# Patient Record
Sex: FEMALE | Race: WHITE | Employment: FULL TIME | URBAN - METROPOLITAN AREA
[De-identification: names, ages, dates, MRNs, and addresses within clinical notes are randomized per-mention and may not be internally consistent; named-entity substitution may affect disease eponyms.]

---

## 2017-01-18 ENCOUNTER — HOSPITAL ENCOUNTER (EMERGENCY)
Age: 37
Discharge: HOME | End: 2017-01-18
Attending: EMERGENCY MEDICINE
Payer: COMMERCIAL

## 2017-01-18 VITALS
WEIGHT: 175 LBS | TEMPERATURE: 98.7 F | RESPIRATION RATE: 18 BRPM | BODY MASS INDEX: 29.16 KG/M2 | HEART RATE: 65 BPM | HEIGHT: 65 IN | DIASTOLIC BLOOD PRESSURE: 75 MMHG | SYSTOLIC BLOOD PRESSURE: 123 MMHG | OXYGEN SATURATION: 97 %

## 2017-01-18 DIAGNOSIS — R09.1 PLEURISY: Primary | ICD-10-CM

## 2017-01-18 LAB
ALBUMIN SERPL-MCNC: 4.6 G/DL (ref 3.9–4.9)
ALP BLD-CCNC: 83 U/L (ref 40–130)
ALT SERPL-CCNC: 31 U/L (ref 0–33)
ANION GAP SERPL CALCULATED.3IONS-SCNC: 12 MEQ/L (ref 7–13)
AST SERPL-CCNC: 29 U/L (ref 0–35)
BASOPHILS ABSOLUTE: 0 K/UL (ref 0–0.2)
BASOPHILS RELATIVE PERCENT: 0.3 %
BILIRUB SERPL-MCNC: 0.4 MG/DL (ref 0–1.2)
BUN BLDV-MCNC: 10 MG/DL (ref 6–20)
CALCIUM SERPL-MCNC: 10.4 MG/DL (ref 8.6–10.2)
CHLORIDE BLD-SCNC: 102 MEQ/L (ref 98–107)
CO2: 27 MEQ/L (ref 22–29)
CREAT SERPL-MCNC: 0.63 MG/DL (ref 0.5–0.9)
D DIMER: 0.42 MG/L FEU (ref 0–0.5)
EOSINOPHILS ABSOLUTE: 0.2 K/UL (ref 0–0.7)
EOSINOPHILS RELATIVE PERCENT: 2.3 %
GFR AFRICAN AMERICAN: >60
GFR NON-AFRICAN AMERICAN: >60
GLOBULIN: 4 G/DL (ref 2.3–3.5)
GLUCOSE BLD-MCNC: 100 MG/DL (ref 74–109)
HCT VFR BLD CALC: 44.5 % (ref 37–47)
HEMOGLOBIN: 15.2 G/DL (ref 12–16)
INR BLD: 1
LYMPHOCYTES ABSOLUTE: 1.5 K/UL (ref 1–4.8)
LYMPHOCYTES RELATIVE PERCENT: 20.2 %
MCH RBC QN AUTO: 29.5 PG (ref 27–31.3)
MCHC RBC AUTO-ENTMCNC: 34.2 % (ref 33–37)
MCV RBC AUTO: 86.3 FL (ref 82–100)
MONOCYTES ABSOLUTE: 0.4 K/UL (ref 0.2–0.8)
MONOCYTES RELATIVE PERCENT: 5 %
NEUTROPHILS ABSOLUTE: 5.2 K/UL (ref 1.4–6.5)
NEUTROPHILS RELATIVE PERCENT: 72.2 %
PDW BLD-RTO: 12.7 % (ref 11.5–14.5)
PLATELET # BLD: 199 K/UL (ref 130–400)
POTASSIUM SERPL-SCNC: 3.9 MEQ/L (ref 3.5–5.1)
PROTHROMBIN TIME: 10.2 SEC (ref 9.6–12.3)
RBC # BLD: 5.16 M/UL (ref 4.2–5.4)
SODIUM BLD-SCNC: 141 MEQ/L (ref 132–144)
TOTAL PROTEIN: 8.6 G/DL (ref 6.4–8.1)
WBC # BLD: 7.2 K/UL (ref 4.8–10.8)

## 2017-01-18 PROCEDURE — 85025 COMPLETE CBC W/AUTO DIFF WBC: CPT

## 2017-01-18 PROCEDURE — 99284 EMERGENCY DEPT VISIT MOD MDM: CPT

## 2017-01-18 PROCEDURE — 6360000002 HC RX W HCPCS: Performed by: EMERGENCY MEDICINE

## 2017-01-18 PROCEDURE — 36415 COLL VENOUS BLD VENIPUNCTURE: CPT

## 2017-01-18 PROCEDURE — 85610 PROTHROMBIN TIME: CPT

## 2017-01-18 PROCEDURE — 85379 FIBRIN DEGRADATION QUANT: CPT

## 2017-01-18 PROCEDURE — 96374 THER/PROPH/DIAG INJ IV PUSH: CPT

## 2017-01-18 PROCEDURE — 80053 COMPREHEN METABOLIC PANEL: CPT

## 2017-01-18 PROCEDURE — 2580000003 HC RX 258: Performed by: EMERGENCY MEDICINE

## 2017-01-18 RX ORDER — NAPROXEN 500 MG/1
500 TABLET ORAL 2 TIMES DAILY
Qty: 20 TABLET | Refills: 0 | Status: SHIPPED | OUTPATIENT
Start: 2017-01-18 | End: 2017-01-28

## 2017-01-18 RX ORDER — ESCITALOPRAM OXALATE 20 MG/1
20 TABLET ORAL DAILY
COMMUNITY

## 2017-01-18 RX ORDER — ALPRAZOLAM 0.5 MG/1
0.5 TABLET ORAL NIGHTLY PRN
COMMUNITY

## 2017-01-18 RX ORDER — MONTELUKAST SODIUM 10 MG/1
10 TABLET ORAL NIGHTLY
COMMUNITY

## 2017-01-18 RX ORDER — SODIUM CHLORIDE 0.9 % (FLUSH) 0.9 %
3 SYRINGE (ML) INJECTION EVERY 8 HOURS
Status: DISCONTINUED | OUTPATIENT
Start: 2017-01-18 | End: 2017-01-18 | Stop reason: HOSPADM

## 2017-01-18 RX ORDER — IBUPROFEN 800 MG/1
800 TABLET ORAL EVERY 6 HOURS PRN
COMMUNITY

## 2017-01-18 RX ORDER — GABAPENTIN 400 MG/1
400 CAPSULE ORAL 2 TIMES DAILY
COMMUNITY

## 2017-01-18 RX ORDER — KETOROLAC TROMETHAMINE 30 MG/ML
30 INJECTION, SOLUTION INTRAMUSCULAR; INTRAVENOUS ONCE
Status: COMPLETED | OUTPATIENT
Start: 2017-01-18 | End: 2017-01-18

## 2017-01-18 RX ADMIN — Medication 3 ML: at 15:19

## 2017-01-18 RX ADMIN — KETOROLAC TROMETHAMINE 30 MG: 30 INJECTION, SOLUTION INTRAMUSCULAR at 15:17

## 2017-01-18 ASSESSMENT — PAIN SCALES - GENERAL
PAINLEVEL_OUTOF10: 7
PAINLEVEL_OUTOF10: 5
PAINLEVEL_OUTOF10: 7
PAINLEVEL_OUTOF10: 2

## 2017-01-18 ASSESSMENT — PAIN DESCRIPTION - PAIN TYPE
TYPE: ACUTE PAIN

## 2017-01-18 ASSESSMENT — ENCOUNTER SYMPTOMS
EYE PAIN: 0
ABDOMINAL PAIN: 0
CONSTIPATION: 0
SORE THROAT: 0
FACIAL SWELLING: 0
TROUBLE SWALLOWING: 0
CHEST TIGHTNESS: 0
COUGH: 0
EYE DISCHARGE: 0
EYE REDNESS: 0
SHORTNESS OF BREATH: 0
WHEEZING: 0
DIARRHEA: 0
VOICE CHANGE: 0
BACK PAIN: 0
VOMITING: 0
SINUS PRESSURE: 0
CHOKING: 0
BLOOD IN STOOL: 0
STRIDOR: 0

## 2017-01-18 ASSESSMENT — PAIN DESCRIPTION - DESCRIPTORS
DESCRIPTORS: ACHING;SORE
DESCRIPTORS: ACHING

## 2017-01-18 ASSESSMENT — PAIN DESCRIPTION - LOCATION
LOCATION: RIB CAGE

## 2017-01-18 ASSESSMENT — PAIN DESCRIPTION - ORIENTATION
ORIENTATION: RIGHT;POSTERIOR;ANTERIOR
ORIENTATION: RIGHT

## 2017-01-18 ASSESSMENT — PAIN DESCRIPTION - ONSET: ONSET: GRADUAL

## 2017-01-18 ASSESSMENT — PAIN - FUNCTIONAL ASSESSMENT: PAIN_FUNCTIONAL_ASSESSMENT: FACES

## 2017-01-18 ASSESSMENT — PAIN DESCRIPTION - PROGRESSION: CLINICAL_PROGRESSION: GRADUALLY WORSENING

## 2017-01-18 ASSESSMENT — PAIN DESCRIPTION - FREQUENCY: FREQUENCY: CONTINUOUS

## 2023-02-21 LAB
ANION GAP IN SER/PLAS: 11 MMOL/L (ref 10–20)
CALCIDIOL (25 OH VITAMIN D3) (NG/ML) IN SER/PLAS: 31 NG/ML
CALCIUM (MG/DL) IN SER/PLAS: 10 MG/DL (ref 8.6–10.3)
CARBON DIOXIDE, TOTAL (MMOL/L) IN SER/PLAS: 29 MMOL/L (ref 21–32)
CHLORIDE (MMOL/L) IN SER/PLAS: 100 MMOL/L (ref 98–107)
COBALAMIN (VITAMIN B12) (PG/ML) IN SER/PLAS: 616 PG/ML (ref 211–911)
CREATININE (MG/DL) IN SER/PLAS: 0.82 MG/DL (ref 0.5–1.05)
ERYTHROCYTE DISTRIBUTION WIDTH (RATIO) BY AUTOMATED COUNT: 13.5 % (ref 11.5–14.5)
ERYTHROCYTE MEAN CORPUSCULAR HEMOGLOBIN CONCENTRATION (G/DL) BY AUTOMATED: 32.8 G/DL (ref 32–36)
ERYTHROCYTE MEAN CORPUSCULAR VOLUME (FL) BY AUTOMATED COUNT: 93 FL (ref 80–100)
ERYTHROCYTES (10*6/UL) IN BLOOD BY AUTOMATED COUNT: 4.97 X10E12/L (ref 4–5.2)
GFR FEMALE: >90 ML/MIN/1.73M2
GLUCOSE (MG/DL) IN SER/PLAS: 75 MG/DL (ref 74–99)
HEMATOCRIT (%) IN BLOOD BY AUTOMATED COUNT: 46 % (ref 36–46)
HEMOGLOBIN (G/DL) IN BLOOD: 15.1 G/DL (ref 12–16)
LEUKOCYTES (10*3/UL) IN BLOOD BY AUTOMATED COUNT: 9.7 X10E9/L (ref 4.4–11.3)
PLATELETS (10*3/UL) IN BLOOD AUTOMATED COUNT: 216 X10E9/L (ref 150–450)
POTASSIUM (MMOL/L) IN SER/PLAS: 4.4 MMOL/L (ref 3.5–5.3)
SODIUM (MMOL/L) IN SER/PLAS: 136 MMOL/L (ref 136–145)
THYROTROPIN (MIU/L) IN SER/PLAS BY DETECTION LIMIT <= 0.05 MIU/L: 2.24 MIU/L (ref 0.44–3.98)
UREA NITROGEN (MG/DL) IN SER/PLAS: 10 MG/DL (ref 6–23)

## 2023-03-28 ENCOUNTER — HOSPITAL ENCOUNTER (OUTPATIENT)
Dept: DATA CONVERSION | Facility: HOSPITAL | Age: 43
End: 2023-03-28
Attending: PAIN MEDICINE | Admitting: PAIN MEDICINE
Payer: COMMERCIAL

## 2023-03-28 DIAGNOSIS — G56.00 CARPAL TUNNEL SYNDROME, UNSPECIFIED UPPER LIMB: ICD-10-CM

## 2023-03-28 DIAGNOSIS — G56.01 CARPAL TUNNEL SYNDROME, RIGHT UPPER LIMB: ICD-10-CM

## 2023-03-28 DIAGNOSIS — Z98.51 TUBAL LIGATION STATUS: ICD-10-CM

## 2023-05-15 ASSESSMENT — PROMIS GLOBAL HEALTH SCALE
RATE_AVERAGE_FATIGUE: SEVERE
EMOTIONAL_PROBLEMS: OFTEN
RATE_MENTAL_HEALTH: POOR
RATE_QUALITY_OF_LIFE: FAIR
RATE_SOCIAL_SATISFACTION: POOR
CARRYOUT_SOCIAL_ACTIVITIES: FAIR
RATE_PHYSICAL_HEALTH: FAIR
RATE_AVERAGE_PAIN: 8
RATE_GENERAL_HEALTH: FAIR
CARRYOUT_PHYSICAL_ACTIVITIES: A LITTLE

## 2023-05-18 ENCOUNTER — TELEPHONE (OUTPATIENT)
Dept: PRIMARY CARE | Facility: CLINIC | Age: 43
End: 2023-05-18
Payer: COMMERCIAL

## 2023-05-18 NOTE — TELEPHONE ENCOUNTER
[id number] MRN  11:36 AM received call from doctor office # [phone].  Office called and is asking for hard copy of nerve induction study to be faxed to: [phone]. Going to [facility] appt. On Tuesday 05/23/23.      This is a Dr. Roman patient, and i did not see it in Epic... so I'm assuming it was faxed over old fashioned way?    Please assist. Thanks!

## 2023-05-19 ENCOUNTER — OFFICE VISIT (OUTPATIENT)
Dept: PRIMARY CARE | Facility: CLINIC | Age: 43
End: 2023-05-19
Payer: COMMERCIAL

## 2023-05-19 VITALS
HEART RATE: 84 BPM | HEIGHT: 65 IN | RESPIRATION RATE: 18 BRPM | DIASTOLIC BLOOD PRESSURE: 75 MMHG | WEIGHT: 211 LBS | BODY MASS INDEX: 35.16 KG/M2 | SYSTOLIC BLOOD PRESSURE: 110 MMHG | OXYGEN SATURATION: 96 %

## 2023-05-19 DIAGNOSIS — Z00.00 ROUTINE HEALTH MAINTENANCE: Primary | ICD-10-CM

## 2023-05-19 DIAGNOSIS — J30.9 ALLERGIC RHINITIS, UNSPECIFIED SEASONALITY, UNSPECIFIED TRIGGER: ICD-10-CM

## 2023-05-19 DIAGNOSIS — Z12.31 BREAST CANCER SCREENING BY MAMMOGRAM: ICD-10-CM

## 2023-05-19 DIAGNOSIS — G62.9 NEUROPATHY: ICD-10-CM

## 2023-05-19 PROBLEM — F41.9 ANXIETY: Status: ACTIVE | Noted: 2023-05-19

## 2023-05-19 PROBLEM — F41.1 GENERALIZED ANXIETY DISORDER: Status: ACTIVE | Noted: 2023-05-19

## 2023-05-19 PROBLEM — G56.00 CARPAL TUNNEL SYNDROME: Status: ACTIVE | Noted: 2023-05-19

## 2023-05-19 PROBLEM — K21.9 GERD (GASTROESOPHAGEAL REFLUX DISEASE): Status: ACTIVE | Noted: 2023-05-19

## 2023-05-19 PROBLEM — R74.8 ELEVATED LIVER ENZYMES: Status: ACTIVE | Noted: 2023-05-19

## 2023-05-19 PROBLEM — K82.8 BILIARY DYSKINESIA: Status: ACTIVE | Noted: 2023-05-19

## 2023-05-19 PROBLEM — F43.10 PTSD (POST-TRAUMATIC STRESS DISORDER): Status: ACTIVE | Noted: 2023-05-19

## 2023-05-19 PROBLEM — R20.2 PARESTHESIA AND PAIN OF BOTH UPPER EXTREMITIES: Status: ACTIVE | Noted: 2023-05-19

## 2023-05-19 PROBLEM — K52.832 LYMPHOCYTIC COLITIS: Status: ACTIVE | Noted: 2023-05-19

## 2023-05-19 PROBLEM — K58.0 IRRITABLE BOWEL SYNDROME WITH DIARRHEA: Status: ACTIVE | Noted: 2023-05-19

## 2023-05-19 PROBLEM — R10.2 PELVIC PAIN IN FEMALE: Status: ACTIVE | Noted: 2023-05-19

## 2023-05-19 PROBLEM — R73.01 IMPAIRED FASTING GLUCOSE: Status: ACTIVE | Noted: 2023-05-19

## 2023-05-19 PROBLEM — F33.1 MODERATE EPISODE OF RECURRENT MAJOR DEPRESSIVE DISORDER (MULTI): Status: ACTIVE | Noted: 2023-05-19

## 2023-05-19 PROBLEM — I10 STAGE 1 HYPERTENSION: Status: ACTIVE | Noted: 2023-05-19

## 2023-05-19 PROBLEM — L20.9 ATOPIC DERMATITIS: Status: ACTIVE | Noted: 2023-05-19

## 2023-05-19 PROBLEM — S92.023A: Status: ACTIVE | Noted: 2023-05-19

## 2023-05-19 PROBLEM — M70.50 PES ANSERINE BURSITIS: Status: ACTIVE | Noted: 2023-05-19

## 2023-05-19 PROBLEM — F41.8 DEPRESSION WITH ANXIETY: Status: ACTIVE | Noted: 2023-05-19

## 2023-05-19 PROBLEM — M79.601 PARESTHESIA AND PAIN OF BOTH UPPER EXTREMITIES: Status: ACTIVE | Noted: 2023-05-19

## 2023-05-19 PROBLEM — G47.00 INSOMNIA: Status: ACTIVE | Noted: 2023-05-19

## 2023-05-19 PROBLEM — L70.9 ACNE: Status: ACTIVE | Noted: 2023-05-19

## 2023-05-19 PROBLEM — M41.9 SCOLIOSIS: Status: ACTIVE | Noted: 2023-05-19

## 2023-05-19 PROBLEM — E55.9 VITAMIN D DEFICIENCY: Status: ACTIVE | Noted: 2023-05-19

## 2023-05-19 PROBLEM — N32.9 BLADDER DISORDER: Status: ACTIVE | Noted: 2023-05-19

## 2023-05-19 PROBLEM — M79.602 PARESTHESIA AND PAIN OF BOTH UPPER EXTREMITIES: Status: ACTIVE | Noted: 2023-05-19

## 2023-05-19 PROBLEM — K76.0 FATTY LIVER: Status: ACTIVE | Noted: 2023-05-19

## 2023-05-19 PROBLEM — K52.9 CHRONIC DIARRHEA: Status: ACTIVE | Noted: 2023-05-19

## 2023-05-19 PROCEDURE — 99396 PREV VISIT EST AGE 40-64: CPT | Performed by: FAMILY MEDICINE

## 2023-05-19 RX ORDER — TRAZODONE HYDROCHLORIDE 50 MG/1
2246400 TABLET ORAL NIGHTLY
COMMUNITY
End: 2023-10-03 | Stop reason: ALTCHOICE

## 2023-05-19 RX ORDER — GABAPENTIN 400 MG/1
400 CAPSULE ORAL 3 TIMES DAILY
Qty: 90 CAPSULE | Refills: 5 | Status: SHIPPED | OUTPATIENT
Start: 2023-05-19 | End: 2023-05-24

## 2023-05-19 RX ORDER — LAMOTRIGINE 100 MG/1
1 TABLET ORAL
COMMUNITY
End: 2023-10-04 | Stop reason: SDUPTHER

## 2023-05-19 RX ORDER — HYDROXYZINE HYDROCHLORIDE 25 MG/1
25 TABLET, FILM COATED ORAL 3 TIMES DAILY PRN
COMMUNITY
End: 2023-10-04

## 2023-05-19 RX ORDER — DULOXETIN HYDROCHLORIDE 60 MG/1
2 CAPSULE, DELAYED RELEASE ORAL DAILY
COMMUNITY
End: 2023-10-04 | Stop reason: SDUPTHER

## 2023-05-19 RX ORDER — PANTOPRAZOLE SODIUM 40 MG/1
TABLET, DELAYED RELEASE ORAL
COMMUNITY
Start: 2022-09-15 | End: 2023-09-21

## 2023-05-19 RX ORDER — BECLOMETHASONE DIPROPIONATE 80 UG/1
2 AEROSOL, METERED NASAL DAILY
Qty: 8.7 G | Refills: 11 | Status: SHIPPED | OUTPATIENT
Start: 2023-05-19 | End: 2023-12-09 | Stop reason: WASHOUT

## 2023-05-19 RX ORDER — ERGOCALCIFEROL 1.25 MG/1
1 CAPSULE ORAL WEEKLY
COMMUNITY
Start: 2022-05-25 | End: 2024-05-08 | Stop reason: SDUPTHER

## 2023-05-19 RX ORDER — ONDANSETRON 8 MG/1
TABLET, ORALLY DISINTEGRATING ORAL
COMMUNITY
End: 2023-10-31

## 2023-05-19 RX ORDER — GABAPENTIN 400 MG/1
1 CAPSULE ORAL 3 TIMES DAILY
COMMUNITY
Start: 2017-04-18 | End: 2023-05-19 | Stop reason: SDUPTHER

## 2023-05-19 ASSESSMENT — ENCOUNTER SYMPTOMS
FEVER: 0
COUGH: 0
NUMBNESS: 1

## 2023-05-19 NOTE — PROGRESS NOTES
"Subjective   Patient ID: Martina Blackwood is a 42 y.o. female who presents for Annual Exam.    HPI     Here today for annual physical and 6-month follow-up  Last mammogram was 5/2022.  This is category 1  She is a current smoker.  Patches were not effective in the past  She is no longer taking hydrochlorothiazide.  Denies any significant leg edema or elevated BP readings out of the office  She has been following with pain management for upper extremity neuropathy and had an EMG which was consistent with carpal tunnel syndrome.  She is scheduled to see a hand surgeon next week.  She is still having pain and tingling in both hands  She has a history of chronic lower extremity neuropathy which has been present for several years.  She previously had a lower extremity EMG done in 2018 that showed mild bilateral L5 radiculopathy.  She has neuropathy that typically will extend up to the level of her lower thighs.  Describes as a pins and needle sensation.  This is up to a 7 out of 10 intensity without gabapentin, but improved to 3 out of 10 when she takes gabapentin.  She has a history of allergic rhinitis and postnasal drainage.  Qnasl worked well for her in the past and she would like to restart this.  She had difficulty tolerating some of the liquid based nasal sprays in the past due to nausea  She is following with psychiatry for MDD, ANTONY, and PTSD.  Follows with GI for fatty liver and elevated transaminases, and chronic diarrhea  She had a Tdap vaccine in 2017.  PPS V vaccine in 2014          Review of Systems   Constitutional:  Negative for fever.   Respiratory:  Negative for cough.    Cardiovascular:  Negative for chest pain and leg swelling.   Neurological:  Positive for numbness.   All other systems reviewed and are negative.      Objective   /75   Pulse 84   Resp 18   Ht 1.651 m (5' 5\")   Wt 95.7 kg (211 lb)   SpO2 96%   BMI 35.11 kg/m²     Physical Exam  Vitals reviewed.   Constitutional:       " General: She is not in acute distress.     Appearance: Normal appearance. She is well-developed.   HENT:      Head: Normocephalic.      Right Ear: Tympanic membrane, ear canal and external ear normal.      Left Ear: Tympanic membrane, ear canal and external ear normal.      Nose: Nose normal.      Mouth/Throat:      Mouth: Mucous membranes are moist.   Eyes:      Conjunctiva/sclera: Conjunctivae normal.   Neck:      Thyroid: No thyromegaly.      Vascular: No JVD.   Cardiovascular:      Rate and Rhythm: Normal rate and regular rhythm.      Heart sounds: Normal heart sounds.   Pulmonary:      Effort: Pulmonary effort is normal.      Breath sounds: Normal breath sounds.   Abdominal:      Palpations: Abdomen is soft.      Tenderness: There is no abdominal tenderness.   Musculoskeletal:         General: Normal range of motion.      Right lower leg: No edema.      Left lower leg: No edema.   Lymphadenopathy:      Cervical: No cervical adenopathy.   Skin:     Findings: No rash.   Neurological:      Mental Status: She is alert and oriented to person, place, and time.      Sensory: No sensory deficit (Sensation intact in lower legs).   Psychiatric:         Mood and Affect: Mood normal.         Behavior: Behavior normal.         Assessment/Plan   Problem List Items Addressed This Visit          Medium    Allergic rhinitis    Relevant Medications    beclomethasone (QNASL) 80 mcg/actuation HFA aerosol inhaler    Neuropathy - Primary    Relevant Medications    gabapentin (Neurontin) 400 mg capsule    Other Relevant Orders    Lipid Panel     Other Visit Diagnoses       Breast cancer screening by mammogram        Relevant Orders    BI mammo bilateral screening tomosynthesis        Tobacco cessation recommended  Ordered screening mammogram  Pap testing not needed due to hysterectomy  Up-to-date on tetanus and pneumococcal vaccines  Check screening lipid panel.  She had labs including BMP, TSH, vitamin B12, and vitamin D done in  February of this year which were in normal range.  She reports getting LFTs checked through gastroenterology  Chronic lower extremity neuropathy has been stable on gabapentin.  Continue current dose and follow-up in 6 months for recheck.  Signed CSA today.  An OARRS report was printed and I have personally reviewed the results.  The report will be scanned into the health record.  I have considered the risk of abuse, dependance, addiction, and diversion.  I believe it is clinically appropriate for this patient to continue taking this medication.  Continue to follow with psychiatry for MDD, ANTONY and PTSD.  Continue to follow with GI for fatty liver, elevated transaminases, and chronic diarrhea.  She is going to be seeing a hand surgeon next week for carpal tunnel syndrome  Follow-up in 6 months for recheck  We will also restart Qnasl for allergic rhinitis and postnasal drainage.  This medication was effective for her in the past

## 2023-05-24 ENCOUNTER — TELEPHONE (OUTPATIENT)
Dept: PRIMARY CARE | Facility: CLINIC | Age: 43
End: 2023-05-24
Payer: COMMERCIAL

## 2023-05-24 DIAGNOSIS — G62.9 NEUROPATHY: Primary | ICD-10-CM

## 2023-05-24 RX ORDER — GABAPENTIN 100 MG/1
200 CAPSULE ORAL 2 TIMES DAILY
Qty: 120 CAPSULE | Refills: 3 | Status: SHIPPED | OUTPATIENT
Start: 2023-05-24 | End: 2023-09-21 | Stop reason: SDUPTHER

## 2023-05-24 RX ORDER — GABAPENTIN 800 MG/1
800 TABLET ORAL NIGHTLY
Qty: 30 TABLET | Refills: 3 | Status: SHIPPED | OUTPATIENT
Start: 2023-05-24 | End: 2023-09-21 | Stop reason: SDUPTHER

## 2023-05-24 NOTE — PROGRESS NOTES
I spoke with her over the phone.  She has a history of chronic lower extremity neuropathy currently taking gabapentin 1200 mg nightly.  I recently saw her 5 days ago for a follow-up appointment.  This has been helping her pain, however she has been following with psychiatry, and they felt that some of her anxiety symptoms may be due to withdrawal from the gabapentin during the day.  They recommended that she space out her gabapentin through the day to help avoid any withdrawal symptoms.  She has tried 400 mg 3 times daily in the past, but found that the 400 mg dose made her feel tired.  We will stop her current dose of gabapentin, and adjust as follows:  She will take gabapentin 800 mg before bedtime  She will take 200 mg in the morning and 200 mg in the afternoon    This will total 1200 mg/day, which is the same daily dose that she was on previously.  I recommended that she schedule a follow-up with me within the next 2 to 3 months for a recheck on how she is doing    An OARRS report was printed and I have personally reviewed the results.  The report will be scanned into the health record.  I have considered the risk of abuse, dependance, addiction, and diversion.  I believe it is clinically appropriate for this patient to continue taking this medication.

## 2023-05-24 NOTE — TELEPHONE ENCOUNTER
Patient called stating she went to see Psychiatrist and they discuss pill form change. Ms Mack was taking 3 Gabapentin at 2 night.   Her new order from Psychiatrist was to take 1/2 tab in the morning, 1/2 tab midday, and 2 at night. In order for her to do the half tabs she needs a new order for tablets instead of capsules. Please advise.

## 2023-05-24 NOTE — TELEPHONE ENCOUNTER
Pt calling back again returning your, pt has her ring tone turned up now, can you please return her call again

## 2023-06-12 ENCOUNTER — HOSPITAL ENCOUNTER (OUTPATIENT)
Dept: DATA CONVERSION | Facility: HOSPITAL | Age: 43
End: 2023-06-12
Payer: COMMERCIAL

## 2023-06-12 DIAGNOSIS — G62.9 POLYNEUROPATHY, UNSPECIFIED: ICD-10-CM

## 2023-06-12 DIAGNOSIS — F17.200 NICOTINE DEPENDENCE, UNSPECIFIED, UNCOMPLICATED: ICD-10-CM

## 2023-06-12 DIAGNOSIS — G56.01 CARPAL TUNNEL SYNDROME, RIGHT UPPER LIMB: ICD-10-CM

## 2023-06-12 DIAGNOSIS — Z88.0 ALLERGY STATUS TO PENICILLIN: ICD-10-CM

## 2023-06-12 DIAGNOSIS — K21.9 GASTRO-ESOPHAGEAL REFLUX DISEASE WITHOUT ESOPHAGITIS: ICD-10-CM

## 2023-06-12 DIAGNOSIS — Z85.3 PERSONAL HISTORY OF MALIGNANT NEOPLASM OF BREAST: ICD-10-CM

## 2023-06-12 DIAGNOSIS — Z88.1 ALLERGY STATUS TO OTHER ANTIBIOTIC AGENTS: ICD-10-CM

## 2023-07-17 ENCOUNTER — TELEPHONE (OUTPATIENT)
Dept: PRIMARY CARE | Facility: CLINIC | Age: 43
End: 2023-07-17
Payer: COMMERCIAL

## 2023-07-17 DIAGNOSIS — K76.0 FATTY LIVER: Primary | ICD-10-CM

## 2023-07-17 DIAGNOSIS — K52.9 CHRONIC DIARRHEA: ICD-10-CM

## 2023-08-18 ENCOUNTER — OFFICE VISIT (OUTPATIENT)
Dept: PRIMARY CARE | Facility: CLINIC | Age: 43
End: 2023-08-18
Payer: COMMERCIAL

## 2023-08-18 VITALS
SYSTOLIC BLOOD PRESSURE: 116 MMHG | WEIGHT: 205 LBS | RESPIRATION RATE: 18 BRPM | TEMPERATURE: 98.5 F | BODY MASS INDEX: 34.11 KG/M2 | HEART RATE: 89 BPM | DIASTOLIC BLOOD PRESSURE: 78 MMHG | OXYGEN SATURATION: 98 %

## 2023-08-18 DIAGNOSIS — J02.9 SORE THROAT: Primary | ICD-10-CM

## 2023-08-18 DIAGNOSIS — J40 SINOBRONCHITIS: ICD-10-CM

## 2023-08-18 DIAGNOSIS — J32.9 SINOBRONCHITIS: ICD-10-CM

## 2023-08-18 DIAGNOSIS — B37.9 ANTIBIOTIC-INDUCED YEAST INFECTION: ICD-10-CM

## 2023-08-18 DIAGNOSIS — T36.95XA ANTIBIOTIC-INDUCED YEAST INFECTION: ICD-10-CM

## 2023-08-18 PROBLEM — J06.9 ACUTE URI: Status: ACTIVE | Noted: 2023-08-18

## 2023-08-18 LAB — POC RAPID STREP: NEGATIVE

## 2023-08-18 PROCEDURE — 3008F BODY MASS INDEX DOCD: CPT | Performed by: NURSE PRACTITIONER

## 2023-08-18 PROCEDURE — 87635 SARS-COV-2 COVID-19 AMP PRB: CPT

## 2023-08-18 PROCEDURE — 99213 OFFICE O/P EST LOW 20 MIN: CPT | Performed by: NURSE PRACTITIONER

## 2023-08-18 PROCEDURE — 87880 STREP A ASSAY W/OPTIC: CPT | Performed by: NURSE PRACTITIONER

## 2023-08-18 PROCEDURE — 87651 STREP A DNA AMP PROBE: CPT

## 2023-08-18 RX ORDER — LAMOTRIGINE 25 MG/1
1 TABLET ORAL DAILY
COMMUNITY
End: 2023-10-04 | Stop reason: SDUPTHER

## 2023-08-18 RX ORDER — ALBUTEROL SULFATE 90 UG/1
2 AEROSOL, METERED RESPIRATORY (INHALATION) EVERY 6 HOURS PRN
Qty: 18 G | Refills: 0 | Status: SHIPPED | OUTPATIENT
Start: 2023-08-18 | End: 2023-10-03 | Stop reason: ALTCHOICE

## 2023-08-18 RX ORDER — DOXYCYCLINE 100 MG/1
100 CAPSULE ORAL 2 TIMES DAILY
Qty: 14 CAPSULE | Refills: 0 | Status: SHIPPED | OUTPATIENT
Start: 2023-08-18 | End: 2023-08-25

## 2023-08-18 RX ORDER — FLUCONAZOLE 150 MG/1
150 TABLET ORAL ONCE
Qty: 1 TABLET | Refills: 0 | Status: SHIPPED | OUTPATIENT
Start: 2023-08-18 | End: 2023-08-18

## 2023-08-18 ASSESSMENT — ENCOUNTER SYMPTOMS
HEADACHES: 1
SORE THROAT: 1
SINUS PRESSURE: 0
ACTIVITY CHANGE: 0
FEVER: 0
VOMITING: 0
SHORTNESS OF BREATH: 1
CHILLS: 0
FATIGUE: 0
CONSTIPATION: 0
APPETITE CHANGE: 0
MYALGIAS: 1
NAUSEA: 0
SINUS PAIN: 0
DIARRHEA: 0
COUGH: 1
SLEEP DISTURBANCE: 0

## 2023-08-18 NOTE — PROGRESS NOTES
Subjective   Patient ID: Martina Blackwood is a 42 y.o. female who presents for Cough.    Cough x1 week  Dry   Sore throat; on fire  Congestion/ no drainage  Headache  Chills/ no fever  Achy  Ear pressure      Sister/ mother are sick    OTC- robutussin/ hard candies/ tyenol    Cough  This is a new problem. Episode onset: 1 week. The cough is Non-productive. Associated symptoms include ear pain, headaches, myalgias, nasal congestion, postnasal drip, a sore throat and shortness of breath. Pertinent negatives include no chills or fever.        Review of Systems   Constitutional:  Negative for activity change, appetite change, chills, fatigue and fever.   HENT:  Positive for congestion, ear pain, postnasal drip and sore throat. Negative for sinus pressure and sinus pain.    Respiratory:  Positive for cough and shortness of breath.    Gastrointestinal:  Negative for constipation, diarrhea, nausea and vomiting.   Musculoskeletal:  Positive for myalgias.   Neurological:  Positive for headaches.   Psychiatric/Behavioral:  Negative for sleep disturbance.        Objective   /78   Pulse 89   Temp 36.9 °C (98.5 °F)   Resp 18   Wt 93 kg (205 lb)   SpO2 98%   BMI 34.11 kg/m²     Physical Exam  Vitals reviewed.   Constitutional:       Appearance: Normal appearance.   HENT:      Head: Normocephalic.      Right Ear: Tympanic membrane, ear canal and external ear normal.      Left Ear: Tympanic membrane, ear canal and external ear normal.      Nose: Mucosal edema and congestion present.      Right Turbinates: Swollen.      Left Turbinates: Swollen.      Mouth/Throat:      Lips: Pink.      Mouth: Mucous membranes are moist.      Pharynx: Posterior oropharyngeal erythema present.   Eyes:      Extraocular Movements: Extraocular movements intact.      Pupils: Pupils are equal, round, and reactive to light.   Cardiovascular:      Rate and Rhythm: Normal rate and regular rhythm.      Pulses: Normal pulses.      Heart sounds:  Normal heart sounds.   Pulmonary:      Effort: Pulmonary effort is normal.      Breath sounds: Normal breath sounds.   Musculoskeletal:      Cervical back: Normal range of motion and neck supple.   Skin:     General: Skin is warm and dry.      Capillary Refill: Capillary refill takes less than 2 seconds.   Neurological:      General: No focal deficit present.      Mental Status: She is alert and oriented to person, place, and time.   Psychiatric:         Mood and Affect: Mood normal.         Behavior: Behavior normal.         Assessment/Plan   Problem List Items Addressed This Visit       Antibiotic-induced yeast infection     Rx given for a one time dose of diflucan; Use as directed  Follow up with PCP as needed           Relevant Medications    fluconazole (Diflucan) 150 mg tablet    Sore throat - Primary     IO Strep negative  Covid swab and Strep PCR to be sent; Will follow up on results as needed  Encouraged to warm, salt water gargle several times a day  Educated on quarantine protocol  Reviewed supportive care for cold/Covid symptoms  Can use Flonase and Zyrtec daily for symptom support  F/U with PCP if not improving over the next 3-5 days  ER for SOB, difficulty breathing, uncontrolled fever         Relevant Orders    POCT rapid strep A manually resulted (Completed)    Sars-CoV-2 PCR, Symptomatic    Sinobronchitis     Antibiotics given for acute sinobronchitis; Take full course until completed  Reviewed supportive care for cold/Covid symptoms  Can use Flonase and Zyrtec daily for symptom support  F/U with PCP if not improving over the next 3-5 days  ER for SOB, difficulty breathing, uncontrolled fever         Relevant Medications    doxycycline (Vibramycin) 100 mg capsule    albuterol 90 mcg/actuation inhaler

## 2023-08-18 NOTE — ASSESSMENT & PLAN NOTE
IO Strep negative  Covid swab and Strep PCR to be sent; Will follow up on results as needed  Encouraged to warm, salt water gargle several times a day  Educated on quarantine protocol  Reviewed supportive care for cold/Covid symptoms  Can use Flonase and Zyrtec daily for symptom support  F/U with PCP if not improving over the next 3-5 days  ER for SOB, difficulty breathing, uncontrolled fever

## 2023-08-18 NOTE — ASSESSMENT & PLAN NOTE
Antibiotics given for acute sinobronchitis; Take full course until completed  Reviewed supportive care for cold/Covid symptoms  Can use Flonase and Zyrtec daily for symptom support  F/U with PCP if not improving over the next 3-5 days  ER for SOB, difficulty breathing, uncontrolled fever

## 2023-08-19 LAB
GROUP A STREP, PCR: NOT DETECTED
SARS-COV-2 RESULT: NOT DETECTED

## 2023-09-07 VITALS — BODY MASS INDEX: 35.48 KG/M2 | WEIGHT: 212.96 LBS | HEIGHT: 65 IN

## 2023-09-21 ENCOUNTER — OFFICE VISIT (OUTPATIENT)
Dept: PRIMARY CARE | Facility: CLINIC | Age: 43
End: 2023-09-21
Payer: COMMERCIAL

## 2023-09-21 VITALS
HEIGHT: 65 IN | OXYGEN SATURATION: 97 % | HEART RATE: 90 BPM | BODY MASS INDEX: 33.82 KG/M2 | WEIGHT: 203 LBS | SYSTOLIC BLOOD PRESSURE: 118 MMHG | DIASTOLIC BLOOD PRESSURE: 80 MMHG

## 2023-09-21 DIAGNOSIS — G62.9 NEUROPATHY: ICD-10-CM

## 2023-09-21 DIAGNOSIS — G56.02 CARPAL TUNNEL SYNDROME OF LEFT WRIST: Primary | ICD-10-CM

## 2023-09-21 PROCEDURE — 99213 OFFICE O/P EST LOW 20 MIN: CPT | Performed by: FAMILY MEDICINE

## 2023-09-21 PROCEDURE — 3008F BODY MASS INDEX DOCD: CPT | Performed by: FAMILY MEDICINE

## 2023-09-21 RX ORDER — OMEPRAZOLE 40 MG/1
40 CAPSULE, DELAYED RELEASE ORAL
COMMUNITY
Start: 2023-08-10

## 2023-09-21 RX ORDER — GABAPENTIN 800 MG/1
800 TABLET ORAL NIGHTLY
Qty: 30 TABLET | Refills: 3 | Status: SHIPPED | OUTPATIENT
Start: 2023-09-21 | End: 2023-11-08

## 2023-09-21 RX ORDER — GABAPENTIN 100 MG/1
200 CAPSULE ORAL 2 TIMES DAILY
Qty: 120 CAPSULE | Refills: 3 | Status: SHIPPED | OUTPATIENT
Start: 2023-09-21 | End: 2023-11-08

## 2023-09-21 ASSESSMENT — ENCOUNTER SYMPTOMS
TINGLING: 1
NUMBNESS: 1

## 2023-09-21 NOTE — PROGRESS NOTES
"Subjective   Patient ID: Martina Blackwood is a 43 y.o. female who presents for Hand Pain.    Hand Pain   The incident occurred more than 1 week ago. There was no injury mechanism. The pain is present in the left hand. The quality of the pain is described as stabbing. The pain is at a severity of 8/10. The pain is moderate. The pain has been Constant since the incident. Associated symptoms include numbness and tingling. She has tried acetaminophen for the symptoms. The treatment provided no relief.        She has had a 6 to 12-month history of pain and paresthesias involving her left wrist and hand.  This is a tingling and burning pain which is up to an 8 out of 10 intensity pain.  She is having weakness and clumsiness involving the hand also.  She has a history of carpal tunnel repair on the right side in June of this year  She previously had an EMG done earlier this year which showed findings of moderate carpal tunnel syndrome on the right, and mild and early carpal tunnel syndrome on the left        Review of Systems   Neurological:  Positive for tingling and numbness.       Objective   /80   Pulse 90   Ht 1.651 m (5' 5\")   Wt 92.1 kg (203 lb)   SpO2 97%   BMI 33.78 kg/m²     Physical Exam  Constitutional:       General: She is not in acute distress.     Appearance: Normal appearance. She is well-developed.   Pulmonary:      Effort: Pulmonary effort is normal.   Musculoskeletal:      Comments: Tenderness along the anterior aspect of the wrist on the left side.  Positive Tinel's at wrist.  Finger strength is intact   Skin:     Findings: No rash.   Neurological:      Mental Status: She is alert.   Psychiatric:         Mood and Affect: Mood normal.         Behavior: Behavior normal.         Assessment/Plan   Problem List Items Addressed This Visit          Medium    Carpal tunnel syndrome - Primary    Relevant Orders    Referral to Orthopaedic Surgery    Neuropathy    Relevant Medications    gabapentin " (Neurontin) 800 mg tablet    gabapentin (Neurontin) 100 mg capsule   She has had worsening pain and paresthesias in her left hand and wrist due to carpal tunnel syndrome.  We discussed activity modification and avoiding repetitive and aggravating activities.  Discussed wrist splint.  We will refer her to orthopedics to discuss further  She is also requesting a refill on her gabapentin for chronic lower extremity neuropathy.  We have been following her regularly for this, and this was last discussed in detail at her most recent appointment in May.  She is scheduled to follow-up in November for a full follow-up on this.  Did not discuss in detail today, but I did give her her refill.  OARRS reviewed

## 2023-10-02 ENCOUNTER — APPOINTMENT (OUTPATIENT)
Dept: PRIMARY CARE | Facility: CLINIC | Age: 43
End: 2023-10-02
Payer: COMMERCIAL

## 2023-10-02 PROBLEM — E66.812 CLASS 2 OBESITY WITH BODY MASS INDEX (BMI) OF 35.0 TO 35.9 IN ADULT: Status: ACTIVE | Noted: 2023-10-02

## 2023-10-02 PROBLEM — S63.629A COMPLETE TEAR OF ULNAR COLLATERAL LIGAMENT OF INTERPHALANGEAL JOINT OF THUMB: Status: ACTIVE | Noted: 2023-10-02

## 2023-10-02 PROBLEM — E66.9 CLASS 2 OBESITY WITH BODY MASS INDEX (BMI) OF 35.0 TO 35.9 IN ADULT: Status: ACTIVE | Noted: 2023-10-02

## 2023-10-02 PROBLEM — R74.01 ELEVATED TRANSAMINASE LEVEL: Status: ACTIVE | Noted: 2023-10-02

## 2023-10-02 PROBLEM — L08.9 SUPERFICIAL SKIN INFECTION: Status: ACTIVE | Noted: 2023-10-02

## 2023-10-02 PROBLEM — C50.911 MALIGNANT NEOPLASM OF RIGHT FEMALE BREAST (MULTI): Status: ACTIVE | Noted: 2023-10-02

## 2023-10-02 PROBLEM — H60.90 OTITIS EXTERNA: Status: ACTIVE | Noted: 2023-10-02

## 2023-10-02 PROBLEM — E66.9 CLASS 2 OBESITY WITH BODY MASS INDEX (BMI) OF 37.0 TO 37.9 IN ADULT: Status: ACTIVE | Noted: 2023-10-02

## 2023-10-02 PROBLEM — S63.649A: Status: ACTIVE | Noted: 2023-10-02

## 2023-10-02 PROBLEM — H93.12 TINNITUS OF LEFT EAR: Status: ACTIVE | Noted: 2023-10-02

## 2023-10-02 PROBLEM — E66.812 CLASS 2 OBESITY WITH BODY MASS INDEX (BMI) OF 37.0 TO 37.9 IN ADULT: Status: ACTIVE | Noted: 2023-10-02

## 2023-10-02 RX ORDER — GABAPENTIN 100 MG/1
CAPSULE ORAL
COMMUNITY
End: 2023-11-08

## 2023-10-02 RX ORDER — CALCIPOTRIENE 0.005% / CLOBETASOL PROPIONATE 0.05% .005; .05 G/100G; G/100G
SOLUTION TOPICAL
COMMUNITY

## 2023-10-02 RX ORDER — TRAZODONE HYDROCHLORIDE 50 MG/1
1-2 TABLET ORAL NIGHTLY
COMMUNITY
End: 2023-10-04 | Stop reason: SDUPTHER

## 2023-10-02 RX ORDER — OXYBUTYNIN CHLORIDE 15 MG/1
TABLET, EXTENDED RELEASE ORAL
COMMUNITY

## 2023-10-02 NOTE — OP NOTE
PROCEDURE DETAILS      Postoperative Diagnosis:  carpal tunnel syndrome  Surgeon: Yanelis Francisco MD   Resident/Fellow/Other Assistant: None     Procedure:  Injection of steroid and local anesthetic at the right median nerve  Estimated Blood Loss: 0  Findings: None         Operative Report:   Patient taken to the procedure area the right wrist was semiflexed and supported prepped and draped sterilely in the normal fashion with the end of 25-gauge  needle 40 mg of Depo-Medrol and 2 cc of 0.25% bupivacaine were injected around the right median nerve with negative aspiration of heme patient tolerated the procedure well recovered for sufficient amount of time and then was discharged home in stable  condition patient was advised to follow-up with the pain clinic on as-needed basis  Thank you for allowing me to participate in the care of this patient    Please note this report has been  produced using speech recognition software and may contain errors related to that system including grammar , punctuation  and spelling as well as words and phrases that may be inappropriate. If there are questions or concerns, please feel free to contact me to clarify.                          Electronic Signatures:  Yanelis Francisco)  (Signed 28-Mar-2023 14:08)   Authored: Post-Operative Note, Chart Review, Note Completion      Last Updated: 28-Mar-2023 14:08 by Yanelis Francisco)

## 2023-10-03 ENCOUNTER — OFFICE VISIT (OUTPATIENT)
Dept: PRIMARY CARE | Facility: CLINIC | Age: 43
End: 2023-10-03
Payer: COMMERCIAL

## 2023-10-03 VITALS
DIASTOLIC BLOOD PRESSURE: 70 MMHG | HEIGHT: 65 IN | SYSTOLIC BLOOD PRESSURE: 118 MMHG | OXYGEN SATURATION: 95 % | HEART RATE: 77 BPM | BODY MASS INDEX: 33.99 KG/M2 | WEIGHT: 204 LBS

## 2023-10-03 DIAGNOSIS — R51.9 NEW ONSET HEADACHE: Primary | ICD-10-CM

## 2023-10-03 PROBLEM — J40 SINOBRONCHITIS: Status: RESOLVED | Noted: 2023-08-18 | Resolved: 2023-10-03

## 2023-10-03 PROBLEM — R74.8 ELEVATED LIVER ENZYMES: Status: RESOLVED | Noted: 2023-05-19 | Resolved: 2023-10-03

## 2023-10-03 PROBLEM — H60.90 OTITIS EXTERNA: Status: RESOLVED | Noted: 2023-10-02 | Resolved: 2023-10-03

## 2023-10-03 PROBLEM — L08.9 SUPERFICIAL SKIN INFECTION: Status: RESOLVED | Noted: 2023-10-02 | Resolved: 2023-10-03

## 2023-10-03 PROBLEM — J32.9 SINOBRONCHITIS: Status: RESOLVED | Noted: 2023-08-18 | Resolved: 2023-10-03

## 2023-10-03 PROBLEM — J02.9 SORE THROAT: Status: RESOLVED | Noted: 2023-08-18 | Resolved: 2023-10-03

## 2023-10-03 PROCEDURE — 3008F BODY MASS INDEX DOCD: CPT | Performed by: FAMILY MEDICINE

## 2023-10-03 PROCEDURE — 99214 OFFICE O/P EST MOD 30 MIN: CPT | Performed by: FAMILY MEDICINE

## 2023-10-03 RX ORDER — AMITRIPTYLINE HYDROCHLORIDE 10 MG/1
10 TABLET, FILM COATED ORAL NIGHTLY
Qty: 30 TABLET | Refills: 3 | Status: SHIPPED | OUTPATIENT
Start: 2023-10-03 | End: 2023-10-25 | Stop reason: SDUPTHER

## 2023-10-03 ASSESSMENT — ENCOUNTER SYMPTOMS
NECK PAIN: 1
SCALP TENDERNESS: 1
NUMBNESS: 1
HEADACHES: 1
DIZZINESS: 0
COUGH: 0
FEVER: 0

## 2023-10-03 NOTE — PROGRESS NOTES
"xSubjective   Patient ID: Martina Blackwood is a 43 y.o. female who presents for Migraine.    Migraine   This is a new problem. The problem has been unchanged. The pain is located in the Bilateral region. The pain radiates to the right neck and left neck. The quality of the pain is described as squeezing, sharp, stabbing, throbbing and band-like. The pain is at a severity of 7/10. The pain is moderate. Associated symptoms include neck pain, numbness (Chronic neuropathy in legs.  No new onset of neurologic symptoms) and scalp tenderness. Pertinent negatives include no coughing, dizziness or fever.   She is here today to discuss headaches  She has had headaches present for 1.5 to 2 months  Headaches are bilateral and involve her upper head and also occiput.  Headaches are constant.  She describes as a squeezing pain which is 7 out of 10 intensity.  The headaches have been getting slightly worse.  There are no aggravating factors.  She does have nausea but this is more chronic.  No photophobia or phonophobia.  No vision changes including double vision or vision loss.  No new onset of extremity numbness.  She has been taking Tylenol with partial improvement  She does have a history of migraines in the past, but these headaches feel different.  Migraines were previously only occur every now and then  There is no family history of any brain tumor or aneurysm  She has not had any recent brain imaging        Review of Systems   Constitutional:  Negative for fever.   Respiratory:  Negative for cough.    Cardiovascular:  Negative for chest pain.   Musculoskeletal:  Positive for neck pain.   Neurological:  Positive for numbness (Chronic neuropathy in legs.  No new onset of neurologic symptoms) and headaches. Negative for dizziness.       Objective   /70   Pulse 77   Ht 1.651 m (5' 5\")   Wt 92.5 kg (204 lb)   SpO2 95%   BMI 33.95 kg/m²     Physical Exam  Vitals reviewed.   Constitutional:       General: She is not in " acute distress.     Appearance: Normal appearance.   HENT:      Head: Normocephalic.      Right Ear: Tympanic membrane, ear canal and external ear normal.      Left Ear: Tympanic membrane, ear canal and external ear normal.   Eyes:      Extraocular Movements: Extraocular movements intact.      Conjunctiva/sclera: Conjunctivae normal.      Pupils: Pupils are equal, round, and reactive to light.   Cardiovascular:      Rate and Rhythm: Normal rate and regular rhythm.      Heart sounds: Normal heart sounds.   Pulmonary:      Effort: Pulmonary effort is normal.      Breath sounds: Normal breath sounds.   Musculoskeletal:         General: Tenderness present.      Comments: Tenderness bilateral upper cervical paraspinals   Lymphadenopathy:      Cervical: No cervical adenopathy.   Skin:     Findings: No rash.   Neurological:      General: No focal deficit present.      Mental Status: She is alert.      Cranial Nerves: No cranial nerve deficit.      Motor: No weakness.      Deep Tendon Reflexes: Reflexes normal.   Psychiatric:         Mood and Affect: Mood normal.         Behavior: Behavior normal.         Assessment/Plan   Problem List Items Addressed This Visit    None  Visit Diagnoses       New onset headache    -  Primary    Relevant Medications    amitriptyline (Elavil) 10 mg tablet    Other Relevant Orders    MR brain w and wo IV contrast    XR cervical spine complete 4-5 views    Referral to Neurology        She presents today with a 1.5 to 2-month history of new onset headache.  She has a normal neurologic exam today.  She has a history of migraine headaches in the past, but her current headache does not fit criteria for migraine.  Since the headache has been getting worse, we will obtain neuroimaging with a brain MRI.  If MRI is unremarkable, then I suspect that her headaches are due to either tension headaches, or possibly cervicogenic related to her neck pain.  We will start amitriptyline 10 mg nightly, and also  obtain x-ray of the cervical spine.  Adverse effects discussed.  We will also refer her to establish with a neurologist to discuss further.  Advised to call if any new or worsening symptoms.  Follow-up in 1 month for recheck

## 2023-10-04 ENCOUNTER — CLINICAL SUPPORT (OUTPATIENT)
Dept: BEHAVIORAL HEALTH | Facility: CLINIC | Age: 43
End: 2023-10-04
Payer: COMMERCIAL

## 2023-10-04 DIAGNOSIS — Z79.899 MEDICATION MANAGEMENT: ICD-10-CM

## 2023-10-04 DIAGNOSIS — Z00.00 HEALTH CARE MAINTENANCE: ICD-10-CM

## 2023-10-04 DIAGNOSIS — F43.10 PTSD (POST-TRAUMATIC STRESS DISORDER): ICD-10-CM

## 2023-10-04 DIAGNOSIS — F33.1 MODERATE EPISODE OF RECURRENT MAJOR DEPRESSIVE DISORDER (MULTI): ICD-10-CM

## 2023-10-04 DIAGNOSIS — F41.1 GENERALIZED ANXIETY DISORDER: ICD-10-CM

## 2023-10-04 PROCEDURE — 99215 OFFICE O/P EST HI 40 MIN: CPT | Performed by: CLINICAL NURSE SPECIALIST

## 2023-10-04 PROCEDURE — 99417 PROLNG OP E/M EACH 15 MIN: CPT | Performed by: CLINICAL NURSE SPECIALIST

## 2023-10-04 PROCEDURE — 99215 OFFICE O/P EST HI 40 MIN: CPT | Mod: 95 | Performed by: CLINICAL NURSE SPECIALIST

## 2023-10-04 RX ORDER — LAMOTRIGINE 100 MG/1
100 TABLET ORAL DAILY
Qty: 30 TABLET | Refills: 1 | Status: SHIPPED | OUTPATIENT
Start: 2023-10-04 | End: 2023-11-30 | Stop reason: SDUPTHER

## 2023-10-04 RX ORDER — DULOXETIN HYDROCHLORIDE 60 MG/1
60 CAPSULE, DELAYED RELEASE ORAL 2 TIMES DAILY
Qty: 60 CAPSULE | Refills: 1 | Status: SHIPPED | OUTPATIENT
Start: 2023-10-04 | End: 2023-11-08 | Stop reason: SDUPTHER

## 2023-10-04 RX ORDER — LAMOTRIGINE 25 MG/1
25 TABLET ORAL DAILY
Qty: 30 TABLET | Refills: 1 | Status: SHIPPED | OUTPATIENT
Start: 2023-10-04 | End: 2023-11-30 | Stop reason: SDUPTHER

## 2023-10-04 RX ORDER — ALPRAZOLAM 0.25 MG/1
0.25 TABLET ORAL DAILY PRN
Qty: 10 TABLET | Refills: 1 | Status: SHIPPED | OUTPATIENT
Start: 2023-10-04 | End: 2023-11-30 | Stop reason: DRUGHIGH

## 2023-10-04 RX ORDER — TRAZODONE HYDROCHLORIDE 50 MG/1
50-100 TABLET ORAL NIGHTLY
Qty: 60 TABLET | Refills: 1 | Status: SHIPPED | OUTPATIENT
Start: 2023-10-04 | End: 2023-11-30 | Stop reason: SDUPTHER

## 2023-10-04 NOTE — PROGRESS NOTES
"Outpatient Psychiatry      Subjective   Martina Blackwood, is a 43 y.o. female,  seen as a new patient to me today for MDD, ANTONY, PTSD.       Assessment/Plan   Diagnoses:   Encounter Diagnoses   Name Primary?    Moderate episode of recurrent major depressive disorder (CMS/HCC)     Generalized anxiety disorder     PTSD (post-traumatic stress disorder)        Assessment:    Limited benefit from hydroxyzine PRN for anxiety. Has also failed trial of gabapentin for anxiety, though it is beneficial for neuropathy, which she continues to take it for.     Discussed obtaining Genesight testing given that she has failed multiple medication trials and benefit from current regimen is somewhat limited. Will defer changes to routine medications until those results are available.     In the meantime, will discontinue hydroxyzine and start low-dose alprazolam PRN for severe anxiety/panic- limited supply, intended to be short-term.     Reviewed [place]ARRS, no discrepancies or concerns. Discussed risk of motor/cognitive impairment, sedation, dependence, tolerance, abuse, withdrawal sequelae, accidental overdose, life-threatening respiratory depression (bhavesh. in combination with alcohol and/or opioids), excess sedation in combination with other sedating medicines.      Otherwise continue current regimen as prescribed.     Treatment Plan/Recommendations:   Discontinue hydroxyzine  Start alprazolam 0.25 mg PO daily PRN for severe anxiety  Continue lamotrigine 125 mg PO daily  Continue duloxetine 60 mg PO BID  UDS ordered for controlled rx monitoring  Will order Genesight testing today  Continue individual therapy    FOLLOW UP: 4-6 weeks.       Reason for Visit:  Med management.     HPI:  Previously a patient of NASREEN Mercedes, seen as a new patient to me today. I have reviewed the medical record, including the most recent visit on 8/23/2023.     Since her last appointment,   Things have been \"super stressful.\"  ~2 weeks ago for several days " "couldn't get in contact with mom- had to call police for a well-check.   Found her at home, semi-responsive, had not been taking care of herself.   Has happened before and they've discussed placement in senior living, but she refuses.     Always has some level of anxiety, \"it never really fully goes away.\"   Does come on stronger and lead to a panic attack at times.   Driving in the direction of a doctor's office where she had a bad experience with surgery in the past is a trigger.   Sometimes untriggered or unexpected.   Feels like she's having a heart attack- chest pain, tightness, shallow breathing.   GI sx- \"almost like immediate diarrhea,\" nausea, sometimes vomiting  Sweaty, itchy, tingly palms.  The most severe part of the panic attack can last from 1- 15 minutes, after that still feels very tense for sometimes hours.   Baseline anxiety has been, \"edward stable,\" but panic has been more frequent- maybe 2-3 times/week.   When symptoms are under good control panic attacks happen maybe once/month.   Hydroxyzine has not been beneficial PRN.  Tried adjusting gabapentin dose to include some daytime dosing for anxiety, but has not been very helpful either- seems that lower dose at night is less effective for neuropathy, which then disrupts her sleep.     Sleep has been \"real funky\" for the past 6-7 months  Some nights very broken, wakes in the middle of the night and can't get back to sleep for hours.  Not feeling rested when she wakes.    Has been getting headaches for the past 2 months.   Saw doctor yesterday to try to figure out what they could be- has to have MRI, and may need to be referred to neurologist.   PCP prescribed amitriptyline 10 mg PO at bedtime.       Previous psychiatric medication trials include:  Alprazolam  Lorazepam  Venlafaxine- extreme nausea, \"just didn't feel right in my own body.\"  Citalopram- \" \"  Bupropion  Sertraline  Buspirone  Quetiapine  Olanzapine   Zolpidem     Most antidepressants " "caused her to feel groggy, mentally cloudy.     No hx of Genesight Testing.     Past Psychiatric History  Inpatient Hx: None  Outpatient Hx:  Previously a patient of Miguel, CNP, and more recently NASREEN Mercedes  Hx of Suicidal Ideation: Denies  Hx of Suicide Attempts: Denies  Hx of Violence/Aggression Towards others (including threats): Denies  Access to Fire Arms and/or Weapons: Denies  Current outpatient: Individual therapist is Vicenta Joe at [facility].     Substance Use History:  Nicotine: current smoker; quit for 4 years (~4 years ago, used patch for a short time), \"and then life hit me in the face, and I started back up.\" Currently smokes 1 ppd. Would like to quit again, has patches at home but hasn't felt ready to attempt yet.    Alcohol: Denies.   Illicits: denied. Tried cannabis as a teen, but didn't like how it made her feel.   Caffeine: caffeinated soft drinks 2-3 mini-cans of Pepsi /day    Legal history related to drug/alcohol use:  N/A.     Treatment history: N/A.        Pertinent medical diagnoses:  New-onset (~2 months) headaches- \"possibly cluster or tension headaches.\"     Current Medications:  Duloxetine- has been taking for ~2-3 years; unsure of benefit; has to take in split dose (60 mg PO BID). Once took as a single dose, \"felt wired and exhausted at the same time.\"   Trazodone- has been taking for ~1 year; has taken multiple times in the past.   Lamotrigine- \"I really don't feel like it's helping.\" Last increased \"a couple months ago,\" and not seeing any improvement in mood sx.   Gabapentin- helpful for neuropathy (which was original reason for rx) but not for sleep or anxiety.     Medical History:  Past Medical History:   Diagnosis Date    Adjustment disorder 2000    Allergic 1995    Anxiety 1997    Chronic pain disorder 1/11/11    Depression 1997    Eczema 1991    GERD (gastroesophageal reflux disease) 1997    Headache     Liver disease 2022    Other abnormal auditory perceptions, " unspecified ear 01/19/2018    Abnormal auditory perception    Other bursitis of knee, unspecified knee     Pes anserine bursitis    Panic attack     Panic disorder 1997    PTSD (post-traumatic stress disorder) 1/11/11    Sleep difficulties     Urinary tract infection 1997    Varicella 1986       Surgical History:  Past Surgical History:   Procedure Laterality Date    CHOLECYSTECTOMY  2022    ENDOMETRIAL ABLATION  2009    HYSTERECTOMY  1/11/11    OTHER SURGICAL HISTORY  12/22/2022    Cystoscopy    OTHER SURGICAL HISTORY  12/22/2022    Vaginal lesion ablation    OTHER SURGICAL HISTORY  09/15/2022    Colonoscopy    OTHER SURGICAL HISTORY  09/15/2022    Esophagogastroduodenoscopy    OTHER SURGICAL HISTORY  02/16/2022    Cholecystectomy laparoscopic    OTHER SURGICAL HISTORY  06/14/2019    Tubal ligation bilateral    OTHER SURGICAL HISTORY  06/14/2019    Hysterectomy    TUBAL LIGATION      WISDOM TOOTH EXTRACTION  2000       Family History:  Family History   Problem Relation Name Age of Onset    Alcohol abuse Mother Pretty     Arthritis Mother Pretty     Asthma Mother Pretty     COPD Mother Pretty     Depression Mother Pretty     Hypertension Mother Pretty     Mental illness Mother Pretty     Miscarriages / Stillbirths Mother Pretty     Anxiety disorder Mother Pretty     Alcohol abuse Father Chidi     COPD Father Chidi     Mental illness Father Chidi     Hearing loss Father Chidi     Celiac disease Father Chidi     Alcohol abuse Brother Vick     Depression Brother Vick     Diabetes Brother Vick     Drug abuse Brother Vick     Mental illness Brother Vick     Bipolar disorder Brother Vick     Other (cannabis use disorder) Brother Vick     Anxiety disorder Brother Vick     Paranoid behavior Brother Vick     Schizophrenia Brother Vick     Self-Injury Brother Vick     Suicide Attempts Brother Vick     Depression Daughter Kinadee     Genetic Testing Daughter Kinadee     Mental illness Daughter Kinadee     Depression Son Noel  "    Mental illness Son Noel     Depression Son Efren     Breast cancer Maternal Grandmother Chantale Perez     Breast cancer Other      Lung cancer Other         Record Review: extensive     Medical Review Of Systems:  Pertinent items are noted in HPI.    Psychiatric Review Of Systems:  As noted in HPI.        Objective   Mental Status Exam  General: well groomed, appropriate eye contact  Psychomotor: no psychomotor agitation or retardation, no tremor or other abnormal movements  Speech: Clear, Non-pressured, and spontaneous  Mood: \"My anxiety has been pretty high.\"  Affect:constricted and anxious  Thought Process: linear, goal directed  Perception: Perception: no perceptual abnormalities noted  Thought Content:  No suicidal ideation, no violent thinking, no delusional content.   Cognition: cognitively intact to conversational testing with respect to attention, orientation, fund of knowledge, recent and remote memory, and language  Insight: intact  Judgement: intact     Risk Assessment  Imminent Risk of Suicide or Serious Self-Injury: Low Risk -- Risk factors include: Age, Depression, History of trauma or abuse , Medical illness comorbidity , Psychosocial stressors including mom's declining health , and Severe anxiety Protective factors include:Denies current suicidal ideation, Denies history of suicide attempts , Future-oriented talk , Willingness to seek help and support , Gender, Receiving and engaged in care for mental, physical, and substance use disorders , History of adhering to treatment recommendations and/or prescribed medication regimen , Support through ongoing medical and mental healthcare relationships , Interpersonal relationships and supports, e.g., family, friends, peers, community , and Restricted access to firearms or other lethal means of suicide   Imminent Risk of Violence or Homicide:  No significant risk factors identified on screening    Vitals:  There were no vitals filed for this " visit.        Allyn Acosta, APRN-CNP, APRN-CNS

## 2023-10-06 ENCOUNTER — LAB (OUTPATIENT)
Dept: LAB | Facility: LAB | Age: 43
End: 2023-10-06
Payer: COMMERCIAL

## 2023-10-06 ENCOUNTER — ANCILLARY PROCEDURE (OUTPATIENT)
Dept: RADIOLOGY | Facility: CLINIC | Age: 43
End: 2023-10-06
Payer: COMMERCIAL

## 2023-10-06 DIAGNOSIS — Z79.899 MEDICATION MANAGEMENT: ICD-10-CM

## 2023-10-06 DIAGNOSIS — G62.9 NEUROPATHY: ICD-10-CM

## 2023-10-06 DIAGNOSIS — R51.9 NEW ONSET HEADACHE: ICD-10-CM

## 2023-10-06 DIAGNOSIS — Z00.00 HEALTH CARE MAINTENANCE: ICD-10-CM

## 2023-10-06 LAB
AMPHETAMINES UR QL SCN: NORMAL
BARBITURATES UR QL SCN: NORMAL
BENZODIAZ UR QL SCN: NORMAL
BZE UR QL SCN: NORMAL
CANNABINOIDS UR QL SCN: NORMAL
CHOLEST SERPL-MCNC: 199 MG/DL (ref 0–199)
CHOLESTEROL/HDL RATIO: 4.2
FENTANYL+NORFENTANYL UR QL SCN: NORMAL
HDLC SERPL-MCNC: 47.9 MG/DL
LDLC SERPL CALC-MCNC: 134 MG/DL (ref 140–190)
NON HDL CHOLESTEROL: 151 MG/DL (ref 0–149)
OPIATES UR QL SCN: NORMAL
OXYCODONE+OXYMORPHONE UR QL SCN: NORMAL
PCP UR QL SCN: NORMAL
TRIGL SERPL-MCNC: 88 MG/DL (ref 0–149)
VLDL: 18 MG/DL (ref 0–40)

## 2023-10-06 PROCEDURE — 36415 COLL VENOUS BLD VENIPUNCTURE: CPT

## 2023-10-06 PROCEDURE — 72050 X-RAY EXAM NECK SPINE 4/5VWS: CPT | Performed by: RADIOLOGY

## 2023-10-06 PROCEDURE — 80307 DRUG TEST PRSMV CHEM ANLYZR: CPT

## 2023-10-06 PROCEDURE — 72050 X-RAY EXAM NECK SPINE 4/5VWS: CPT

## 2023-10-06 PROCEDURE — 80061 LIPID PANEL: CPT

## 2023-10-18 ENCOUNTER — OFFICE VISIT (OUTPATIENT)
Dept: ORTHOPEDIC SURGERY | Facility: CLINIC | Age: 43
End: 2023-10-18
Payer: COMMERCIAL

## 2023-10-18 DIAGNOSIS — G56.02 CARPAL TUNNEL SYNDROME OF LEFT WRIST: ICD-10-CM

## 2023-10-18 PROCEDURE — 3008F BODY MASS INDEX DOCD: CPT | Performed by: ORTHOPAEDIC SURGERY

## 2023-10-18 PROCEDURE — 99204 OFFICE O/P NEW MOD 45 MIN: CPT | Performed by: ORTHOPAEDIC SURGERY

## 2023-10-18 PROCEDURE — 99214 OFFICE O/P EST MOD 30 MIN: CPT | Performed by: ORTHOPAEDIC SURGERY

## 2023-10-18 NOTE — LETTER
October 31, 2023     Jean Roman DO  [address]  [place]  [facility] [address]    Patient: Martina Blackwood   YOB: 1980   Date of Visit: 10/18/2023       Dear Dr. Jean Roman DO:    Thank you for referring Martina Blackwood to me for evaluation. Below are my notes for this consultation.  If you have questions, please do not hesitate to call me. I look forward to following your patient along with you.       Sincerely,     Torin Cole MD      CC: No Recipients  ______________________________________________________________________________________    CHIEF COMPLAINT         Left hand and wrist pain    ASSESSMENT + PLAN    Left carpal tunnel syndrome with positive EMG and early APB change    The nature of carpal tunnel syndrome was reviewed, along with the slowly progressive natural history.  The options for management were reviewed, including night splinting, cortisone injection, or surgical carpal tunnel release.  The major benefits and risks of surgery were specifically reviewed, as was the postoperative rehabilitation course.    The patient does want to go ahead with surgery and will contact the office to schedule an exact surgical date.  The procedure will be done under sedation and local, at the location of her convenience.        HISTORY OF PRESENT ILLNESS       Patient is a 43 y.o. female on disability, who presents today at suggestion of Dr. Roman, for evaluation of about 8 months of left hand and wrist pain from carpal tunnel syndrome.  There was no particular trauma around time of onset.  This initially would wake her from sleep with a positive shake sign.  She has been using a brace but it is no longer helping.  She had carpal tunnel surgery by Dr. Kim in [place] on the right for similar symptoms.  She reports that tingling from that side is much better, though not yet gone.  She has had an EMG, summarized below.  She wants to proceed with carpal tunnel surgery on the left.    She is  not diabetic or hypothyroid.  She does not smoke.  She does have depression and PTSD.  She is followed by Pain Management.  She takes gabapentin 1200.      REVIEW OF SYSTEMS       A 30-item multi-system Review Of Systems was obtained on today's intake form.  This was reviewed with the patient and is correct.  The pertinent positives and negatives are listed above.  The form has been scanned separately into the medical record.      PHYSICAL EXAM    Constitutional:    Appears stated age. Well-developed and well-nourished obese female in no acute distress.  Psychiatric:         Pleasant normal mood and affect. Behavior is appropriate for the situation.   Head:                   Normocephalic and atraumatic.  Eyes:                    Pupils are equal and round.  Cardiovascular:  2+ radial and ulnar pulses. Fingers well-perfused.  Respiratory:        Effort normal. No respiratory distress. Speaking in complete sentences.  Neurologic:       Alert and oriented to person, place, and time.  Skin:                Skin is intact, warm and dry.  Hematologic / Lymphatic:    No lymphedema or lymphangitis.    Extremities / Musculoskeletal:                      Skin of the left hand and wrist is intact with no erythema, ecchymosis, or diffuse swelling.  Normal skin drag and coloration.  Full composite finger flexion extension with full intrinsic plus minus posture.  Symmetric wrist and forearm motion.  5/5 APB and hand intrinsics with no wasting.  Cervical range of motion is good and does not reproduce chief complaint.  Negative Lita.  Negative Spurling.  Positive Phalen and Durkan at the wrist but negative Tinel at wrist and elbow.  Negative elbow flexion test.  Sensation intact to light touch in all distributions.  Capillary refill less than 2 seconds.      IMAGING / LABS / EMGs           EMG from February 21 is positive for carpal tunnel syndrome with early APB motor change bilaterally.  This was prior to her carpal tunnel  release on the right by Dr. Kim.      Past Medical History:   Diagnosis Date   • Adjustment disorder    • Allergic    • Anxiety    • Chronic pain disorder 11   • Depression    • Eczema    • GERD (gastroesophageal reflux disease)    • Headache    • Liver disease    • Other abnormal auditory perceptions, unspecified ear 2018    Abnormal auditory perception   • Other bursitis of knee, unspecified knee     Pes anserine bursitis   • Panic attack    • Panic disorder    • PTSD (post-traumatic stress disorder) 11   • Sleep difficulties    • Urinary tract infection    • Varicella        Medication Documentation Review Audit       Reviewed by Torin Cole MD (Physician) on 10/31/23 at 0718      Medication Order Taking? Sig Documenting Provider Last Dose Status   ALPRAZolam (Xanax) 0.25 mg tablet [id number]  Take 1 tablet (0.25 mg) by mouth once daily as needed for anxiety for up to 20 days. PABLO Joseph-CNP, APRN-CNS   10/24/23 2359   Discontinued 10/25/23 1645   amitriptyline (Elavil) 10 mg tablet [id number]  Take 2 tablets (20 mg) by mouth once daily at bedtime. Jean Roman, DO  Active   beclomethasone (QNASL) 80 mcg/actuation HFA aerosol inhaler [id number] No Administer 2 sprays into each nostril once daily. Jean Roman DO Taking Active   clobetasoL-calcipotriene 0.05-0.005 % solution [id number] No APPLY AND GENTLY MASSAGE INTO AFFECTED AREA(S) ONCE DAILY Historical Provider, MD Taking Active   diazePAM (Valium) 2 mg tablet [id number]  Take 1 tablet (2 mg) by mouth 1 time for 1 dose. Take 30 to 60 minutes prior to MRI Jean Roman DO   10/23/23 2359   dicyclomine (Bentyl) 20 mg tablet [id number]  Take 1 tablet (20 mg) by mouth 2 times a day for 5 days. Chip Eubanks, DO  Active   DULoxetine (Cymbalta) 60 mg DR capsule [id number]  Take 1 capsule (60 mg) by mouth 2 times a day. Allyn Acosta, PABLO-CNP, APRN-CNS  Active   ergocalciferol (Vitamin  D-2) 1.25 MG (63127 UT) capsule [id number] No Take 1 capsule (1,250 mcg) by mouth once a week. Historical Provider, MD Taking Active   gabapentin (Neurontin) 100 mg capsule [id number] No Take 2 capsules (200 mg) by mouth 2 times a day. Take 200 mg in the morning and 200 mg in the afternoon Jean Roman DO Taking  10/21/23 235   gabapentin (Neurontin) 100 mg capsule [id number] No 1 CAP QAM AND 3 CAPS at bedtime WITH 800 MG CAP Historical Provider, MD Not Taking Active   gabapentin (Neurontin) 800 mg tablet [id number] No Take 1 tablet (800 mg) by mouth once daily at bedtime. Jean Roman DO Taking  10/21/23 235   GAS RELIEF, SIMETHICONE, ORAL [id number] No  Historical Provider, MD Taking Active   lamoTRIgine (LaMICtal) 100 mg tablet [id number]  Take 1 tablet (100 mg) by mouth once daily. With 25 mg for a total dose of 125 mg CHRISTINA Joseph, APRN-CNS  Active   lamoTRIgine (LaMICtal) 25 mg tablet [id number]  Take 1 tablet (25 mg) by mouth once daily. With 100 mg tablet for a total dose of 125mg CHRISTINA Joseph, PABLO-CNS  Active   omeprazole (PriLOSEC) 40 mg DR capsule [id number] No Take 1 capsule (40 mg) by mouth once daily in the morning. Take before meals. 1/2 hr prior to breakfast Historical Provider, MD Taking Active   ondansetron ODT (Zofran-ODT) 8 mg disintegrating tablet [id number] No DISSOLVE 1 TABLET ON THE TONGUE EVERY 8 HOURS AS NEEDED Historical Provider, MD Taking Active   ondansetron ODT (Zofran-ODT) 8 mg disintegrating tablet [id number]  Take 1 tablet (8 mg) by mouth every 8 hours if needed for nausea or vomiting for up to 3 days. Chip Eubanks DO  Active   oxybutynin XL (Ditropan-XL) 15 mg 24 hr tablet [id number] No  Historical Provider, MD Taking Active   traZODone (Desyrel) 50 mg tablet [id number]  Take 1-2 tablets ( mg) by mouth once daily at bedtime. Allyn Acosta, APRN-CNP, APRN-CNS  Active                    Allergies   Allergen Reactions   • Azithromycin Unknown   •  Penicillins Unknown, Diarrhea and Hives   • Trospium Hives   • Adhesive Tape-Silicones Rash   • Aloe Vera Hives and Rash   • Erythromycin Diarrhea, Hives, Other and Rash   • Latex Hives, Rash and Swelling     Per pt latex tape       Social History     Socioeconomic History   • Marital status:      Spouse name: Not on file   • Number of children: Not on file   • Years of education: Not on file   • Highest education level: Not on file   Occupational History   • Not on file   Tobacco Use   • Smoking status: Every Day     Packs/day: 1.00     Years: 15.00     Additional pack years: 0.00     Total pack years: 15.00     Types: Cigarettes   • Smokeless tobacco: Never   Vaping Use   • Vaping Use: Never used   Substance and Sexual Activity   • Alcohol use: Not Currently   • Drug use: Not Currently   • Sexual activity: Yes     Partners: Male     Birth control/protection: Other     Comment: Hysterectomy   Other Topics Concern   • Not on file   Social History Narrative   • Not on file     Social Determinants of Health     Financial Resource Strain: Not on file   Food Insecurity: Not on file   Transportation Needs: Not on file   Physical Activity: Not on file   Stress: Not on file   Social Connections: Not on file   Intimate Partner Violence: Not on file   Housing Stability: Not on file       Past Surgical History:   Procedure Laterality Date   • CHOLECYSTECTOMY  2022   • ENDOMETRIAL ABLATION  2009   • HYSTERECTOMY  1/11/11   • OTHER SURGICAL HISTORY  12/22/2022    Cystoscopy   • OTHER SURGICAL HISTORY  12/22/2022    Vaginal lesion ablation   • OTHER SURGICAL HISTORY  09/15/2022    Colonoscopy   • OTHER SURGICAL HISTORY  09/15/2022    Esophagogastroduodenoscopy   • OTHER SURGICAL HISTORY  02/16/2022    Cholecystectomy laparoscopic   • OTHER SURGICAL HISTORY  06/14/2019    Tubal ligation bilateral   • OTHER SURGICAL HISTORY  06/14/2019    Hysterectomy   • TUBAL LIGATION     • WISDOM TOOTH EXTRACTION  2000          Electronically Signed      JOELELN Cole MD      Orthopaedic Hand Surgery      [phone]

## 2023-10-20 ENCOUNTER — ANCILLARY PROCEDURE (OUTPATIENT)
Dept: RADIOLOGY | Facility: CLINIC | Age: 43
End: 2023-10-20
Payer: COMMERCIAL

## 2023-10-20 DIAGNOSIS — R51.9 NEW ONSET HEADACHE: ICD-10-CM

## 2023-10-23 ENCOUNTER — TELEPHONE (OUTPATIENT)
Dept: PRIMARY CARE | Facility: CLINIC | Age: 43
End: 2023-10-23
Payer: COMMERCIAL

## 2023-10-23 DIAGNOSIS — F41.9 ANXIETY: Primary | ICD-10-CM

## 2023-10-23 RX ORDER — DIAZEPAM 2 MG/1
2 TABLET ORAL ONCE
Qty: 1 TABLET | Refills: 0 | Status: SHIPPED | OUTPATIENT
Start: 2023-10-23 | End: 2023-11-08

## 2023-10-23 NOTE — TELEPHONE ENCOUNTER
Pt calling in regards to having a MRI scheduled for Friday 10/20/23 pt states was unable to complete MRI due to her being claustrophobic pt states was told by nurse that she would need to be sedated and to contact her PCP to have a sedation  medication called into her pharmacy. MRI has been rescheduled for tomorrow 10/23/23 @ 2pm pt states she has a ride available     [facility]

## 2023-10-23 NOTE — TELEPHONE ENCOUNTER
I spoke with her over the phone.  She was scheduled to have an MRI done but had to cancel it because she became claustrophobic and very anxious.  She is requesting a medication for her to take prior to MRI to help with this.  I sent in diazepam 2 mg, one-time dose for her to take 30 to 60 minutes prior to MRI.  We discussed adverse effects.  She is aware that she should not drive when taking this medication and she is going to be having someone else drive her.  I did review OARRS and she was given a prescription for alprazolam 0.25 mg, quantity of 10 on 10/4/2023.  She states that she did complete this medication and no longer is taking this med or any other benzodiazepines.  We discussed the importance of making sure that she does not take this medication with any other sedating medications or other benzodiazepines and she understands.

## 2023-10-24 ENCOUNTER — ANCILLARY PROCEDURE (OUTPATIENT)
Dept: RADIOLOGY | Facility: CLINIC | Age: 43
End: 2023-10-24
Payer: COMMERCIAL

## 2023-10-24 VITALS — BODY MASS INDEX: 34.99 KG/M2 | HEIGHT: 65 IN | WEIGHT: 210 LBS

## 2023-10-24 DIAGNOSIS — R51.9 HEADACHE: Primary | ICD-10-CM

## 2023-10-24 DIAGNOSIS — F69 BEHAVIOR CONCERN IN ADULT: ICD-10-CM

## 2023-10-24 PROCEDURE — 2550000001 HC RX 255 CONTRASTS: Performed by: FAMILY MEDICINE

## 2023-10-24 PROCEDURE — 70553 MRI BRAIN STEM W/O & W/DYE: CPT

## 2023-10-24 PROCEDURE — A9575 INJ GADOTERATE MEGLUMI 0.1ML: HCPCS | Performed by: FAMILY MEDICINE

## 2023-10-24 PROCEDURE — 70553 MRI BRAIN STEM W/O & W/DYE: CPT | Performed by: RADIOLOGY

## 2023-10-24 RX ORDER — GADOTERATE MEGLUMINE 376.9 MG/ML
19 INJECTION INTRAVENOUS
Status: COMPLETED | OUTPATIENT
Start: 2023-10-24 | End: 2023-10-24

## 2023-10-24 RX ADMIN — GADOTERATE MEGLUMINE 19 ML: 376.9 INJECTION INTRAVENOUS at 14:28

## 2023-10-25 ENCOUNTER — TELEPHONE (OUTPATIENT)
Dept: PRIMARY CARE | Facility: CLINIC | Age: 43
End: 2023-10-25
Payer: COMMERCIAL

## 2023-10-25 DIAGNOSIS — R51.9 NEW ONSET HEADACHE: ICD-10-CM

## 2023-10-25 RX ORDER — AMITRIPTYLINE HYDROCHLORIDE 10 MG/1
20 TABLET, FILM COATED ORAL NIGHTLY
Qty: 60 TABLET | Refills: 3 | Status: SHIPPED | OUTPATIENT
Start: 2023-10-25 | End: 2024-02-23 | Stop reason: SDUPTHER

## 2023-10-25 NOTE — TELEPHONE ENCOUNTER
I called her and discussed brain MRI results with her over the phone.  The MRI showed a tiny focus of nonenhancing hyperintensity in the left centrum semiovale which possibly reflected a small area of gliosis of unclear clinical significance.  Otherwise her brain MRI was unremarkable and there were no findings to explain her headaches.  I do not feel that this finding is the cause of her headaches.  She states she is still having headaches but has been tolerating the amitriptyline without any significant side effects.  We will increase the dose from 10 to 20 mg daily.  She has not yet scheduled to see neurology so I will have my staff reach out to her tomorrow to help schedule this.  Recommended that she discuss her headaches further, as well as the above MRI finding with neurology

## 2023-10-29 ENCOUNTER — APPOINTMENT (OUTPATIENT)
Dept: RADIOLOGY | Facility: HOSPITAL | Age: 43
End: 2023-10-29
Payer: COMMERCIAL

## 2023-10-29 ENCOUNTER — HOSPITAL ENCOUNTER (EMERGENCY)
Facility: HOSPITAL | Age: 43
Discharge: HOME | End: 2023-10-29
Attending: EMERGENCY MEDICINE
Payer: COMMERCIAL

## 2023-10-29 VITALS
BODY MASS INDEX: 33.43 KG/M2 | OXYGEN SATURATION: 100 % | TEMPERATURE: 97.7 F | DIASTOLIC BLOOD PRESSURE: 80 MMHG | HEART RATE: 89 BPM | HEIGHT: 65 IN | SYSTOLIC BLOOD PRESSURE: 146 MMHG | WEIGHT: 200.62 LBS | RESPIRATION RATE: 18 BRPM

## 2023-10-29 DIAGNOSIS — R19.7 NAUSEA VOMITING AND DIARRHEA: ICD-10-CM

## 2023-10-29 DIAGNOSIS — R10.30 LOWER ABDOMINAL PAIN: Primary | ICD-10-CM

## 2023-10-29 DIAGNOSIS — N83.202 CYST OF LEFT OVARY: ICD-10-CM

## 2023-10-29 DIAGNOSIS — R11.2 NAUSEA VOMITING AND DIARRHEA: ICD-10-CM

## 2023-10-29 LAB
ALBUMIN SERPL BCP-MCNC: 4.2 G/DL (ref 3.4–5)
ALP SERPL-CCNC: 100 U/L (ref 33–110)
ALT SERPL W P-5'-P-CCNC: 35 U/L (ref 7–45)
ANION GAP SERPL CALC-SCNC: 13 MMOL/L (ref 10–20)
APPEARANCE UR: ABNORMAL
AST SERPL W P-5'-P-CCNC: 29 U/L (ref 9–39)
BASOPHILS # BLD AUTO: 0.05 X10*3/UL (ref 0–0.1)
BASOPHILS NFR BLD AUTO: 0.4 %
BILIRUB SERPL-MCNC: 0.6 MG/DL (ref 0–1.2)
BILIRUB UR STRIP.AUTO-MCNC: NEGATIVE MG/DL
BUN SERPL-MCNC: 11 MG/DL (ref 6–23)
CALCIUM SERPL-MCNC: 10 MG/DL (ref 8.6–10.3)
CARDIAC TROPONIN I PNL SERPL HS: <3 NG/L (ref 0–13)
CHLORIDE SERPL-SCNC: 101 MMOL/L (ref 98–107)
CO2 SERPL-SCNC: 24 MMOL/L (ref 21–32)
COLOR UR: YELLOW
CREAT SERPL-MCNC: 0.78 MG/DL (ref 0.5–1.05)
EOSINOPHIL # BLD AUTO: 0.1 X10*3/UL (ref 0–0.7)
EOSINOPHIL NFR BLD AUTO: 0.8 %
ERYTHROCYTE [DISTWIDTH] IN BLOOD BY AUTOMATED COUNT: 12.9 % (ref 11.5–14.5)
FLUAV RNA RESP QL NAA+PROBE: NOT DETECTED
FLUBV RNA RESP QL NAA+PROBE: NOT DETECTED
GFR SERPL CREATININE-BSD FRML MDRD: >90 ML/MIN/1.73M*2
GLUCOSE SERPL-MCNC: 100 MG/DL (ref 74–99)
GLUCOSE UR STRIP.AUTO-MCNC: NEGATIVE MG/DL
HCT VFR BLD AUTO: 46.6 % (ref 36–46)
HGB BLD-MCNC: 16 G/DL (ref 12–16)
HOLD SPECIMEN: NORMAL
IMM GRANULOCYTES # BLD AUTO: 0.04 X10*3/UL (ref 0–0.7)
IMM GRANULOCYTES NFR BLD AUTO: 0.3 % (ref 0–0.9)
KETONES UR STRIP.AUTO-MCNC: NEGATIVE MG/DL
LACTATE SERPL-SCNC: 1 MMOL/L (ref 0.4–2)
LEUKOCYTE ESTERASE UR QL STRIP.AUTO: NEGATIVE
LIPASE SERPL-CCNC: 22 U/L (ref 9–82)
LYMPHOCYTES # BLD AUTO: 3.16 X10*3/UL (ref 1.2–4.8)
LYMPHOCYTES NFR BLD AUTO: 25.1 %
MAGNESIUM SERPL-MCNC: 1.88 MG/DL (ref 1.6–2.4)
MCH RBC QN AUTO: 29.9 PG (ref 26–34)
MCHC RBC AUTO-ENTMCNC: 34.3 G/DL (ref 32–36)
MCV RBC AUTO: 87 FL (ref 80–100)
MONOCYTES # BLD AUTO: 0.58 X10*3/UL (ref 0.1–1)
MONOCYTES NFR BLD AUTO: 4.6 %
NEUTROPHILS # BLD AUTO: 8.67 X10*3/UL (ref 1.2–7.7)
NEUTROPHILS NFR BLD AUTO: 68.8 %
NITRITE UR QL STRIP.AUTO: NEGATIVE
NRBC BLD-RTO: 0 /100 WBCS (ref 0–0)
PH UR STRIP.AUTO: 6 [PH]
PLATELET # BLD AUTO: 252 X10*3/UL (ref 150–450)
PMV BLD AUTO: 10.1 FL (ref 7.5–11.5)
POTASSIUM SERPL-SCNC: 3.5 MMOL/L (ref 3.5–5.3)
PROT SERPL-MCNC: 7.7 G/DL (ref 6.4–8.2)
PROT UR STRIP.AUTO-MCNC: NEGATIVE MG/DL
RBC # BLD AUTO: 5.36 X10*6/UL (ref 4–5.2)
RBC # UR STRIP.AUTO: NEGATIVE /UL
SARS-COV-2 RNA RESP QL NAA+PROBE: NOT DETECTED
SODIUM SERPL-SCNC: 134 MMOL/L (ref 136–145)
SP GR UR STRIP.AUTO: 1.02
UROBILINOGEN UR STRIP.AUTO-MCNC: <2 MG/DL
WBC # BLD AUTO: 12.6 X10*3/UL (ref 4.4–11.3)

## 2023-10-29 PROCEDURE — 83735 ASSAY OF MAGNESIUM: CPT | Performed by: EMERGENCY MEDICINE

## 2023-10-29 PROCEDURE — 76856 US EXAM PELVIC COMPLETE: CPT | Performed by: RADIOLOGY

## 2023-10-29 PROCEDURE — 36415 COLL VENOUS BLD VENIPUNCTURE: CPT | Performed by: EMERGENCY MEDICINE

## 2023-10-29 PROCEDURE — 74177 CT ABD & PELVIS W/CONTRAST: CPT | Performed by: RADIOLOGY

## 2023-10-29 PROCEDURE — 2550000001 HC RX 255 CONTRASTS: Performed by: EMERGENCY MEDICINE

## 2023-10-29 PROCEDURE — 96375 TX/PRO/DX INJ NEW DRUG ADDON: CPT | Mod: 59

## 2023-10-29 PROCEDURE — 76830 TRANSVAGINAL US NON-OB: CPT | Mod: 59 | Performed by: RADIOLOGY

## 2023-10-29 PROCEDURE — 83605 ASSAY OF LACTIC ACID: CPT | Performed by: EMERGENCY MEDICINE

## 2023-10-29 PROCEDURE — 76856 US EXAM PELVIC COMPLETE: CPT

## 2023-10-29 PROCEDURE — 87636 SARSCOV2 & INF A&B AMP PRB: CPT | Performed by: EMERGENCY MEDICINE

## 2023-10-29 PROCEDURE — 94760 N-INVAS EAR/PLS OXIMETRY 1: CPT

## 2023-10-29 PROCEDURE — 99285 EMERGENCY DEPT VISIT HI MDM: CPT | Mod: 25 | Performed by: EMERGENCY MEDICINE

## 2023-10-29 PROCEDURE — 83690 ASSAY OF LIPASE: CPT | Performed by: EMERGENCY MEDICINE

## 2023-10-29 PROCEDURE — 84484 ASSAY OF TROPONIN QUANT: CPT | Performed by: EMERGENCY MEDICINE

## 2023-10-29 PROCEDURE — 74177 CT ABD & PELVIS W/CONTRAST: CPT

## 2023-10-29 PROCEDURE — 71045 X-RAY EXAM CHEST 1 VIEW: CPT

## 2023-10-29 PROCEDURE — 76830 TRANSVAGINAL US NON-OB: CPT

## 2023-10-29 PROCEDURE — 96374 THER/PROPH/DIAG INJ IV PUSH: CPT | Mod: 59

## 2023-10-29 PROCEDURE — 80053 COMPREHEN METABOLIC PANEL: CPT | Performed by: EMERGENCY MEDICINE

## 2023-10-29 PROCEDURE — 71045 X-RAY EXAM CHEST 1 VIEW: CPT | Performed by: RADIOLOGY

## 2023-10-29 PROCEDURE — 81003 URINALYSIS AUTO W/O SCOPE: CPT | Performed by: EMERGENCY MEDICINE

## 2023-10-29 PROCEDURE — 2500000004 HC RX 250 GENERAL PHARMACY W/ HCPCS (ALT 636 FOR OP/ED): Performed by: EMERGENCY MEDICINE

## 2023-10-29 PROCEDURE — 85025 COMPLETE CBC W/AUTO DIFF WBC: CPT | Performed by: EMERGENCY MEDICINE

## 2023-10-29 RX ORDER — MORPHINE SULFATE 4 MG/ML
4 INJECTION, SOLUTION INTRAMUSCULAR; INTRAVENOUS ONCE
Status: COMPLETED | OUTPATIENT
Start: 2023-10-29 | End: 2023-10-29

## 2023-10-29 RX ORDER — DICYCLOMINE HYDROCHLORIDE 20 MG/1
20 TABLET ORAL 2 TIMES DAILY
Qty: 10 TABLET | Refills: 0 | Status: SHIPPED | OUTPATIENT
Start: 2023-10-29 | End: 2023-10-31 | Stop reason: SDUPTHER

## 2023-10-29 RX ORDER — ONDANSETRON 8 MG/1
8 TABLET, ORALLY DISINTEGRATING ORAL EVERY 8 HOURS PRN
Qty: 9 TABLET | Refills: 0 | Status: SHIPPED | OUTPATIENT
Start: 2023-10-29 | End: 2023-10-31 | Stop reason: SDUPTHER

## 2023-10-29 RX ORDER — DIPHENHYDRAMINE HYDROCHLORIDE 50 MG/ML
25 INJECTION INTRAMUSCULAR; INTRAVENOUS ONCE
Status: COMPLETED | OUTPATIENT
Start: 2023-10-29 | End: 2023-10-29

## 2023-10-29 RX ORDER — METOCLOPRAMIDE HYDROCHLORIDE 5 MG/ML
10 INJECTION INTRAMUSCULAR; INTRAVENOUS ONCE
Status: COMPLETED | OUTPATIENT
Start: 2023-10-29 | End: 2023-10-29

## 2023-10-29 RX ADMIN — METOCLOPRAMIDE 10 MG: 5 INJECTION, SOLUTION INTRAMUSCULAR; INTRAVENOUS at 15:45

## 2023-10-29 RX ADMIN — MORPHINE SULFATE 4 MG: 4 INJECTION, SOLUTION INTRAMUSCULAR; INTRAVENOUS at 15:45

## 2023-10-29 RX ADMIN — DIPHENHYDRAMINE HYDROCHLORIDE 25 MG: 50 INJECTION, SOLUTION INTRAMUSCULAR; INTRAVENOUS at 15:45

## 2023-10-29 RX ADMIN — IOHEXOL 75 ML: 350 INJECTION, SOLUTION INTRAVENOUS at 17:28

## 2023-10-29 ASSESSMENT — LIFESTYLE VARIABLES
EVER FELT BAD OR GUILTY ABOUT YOUR DRINKING: NO
HAVE PEOPLE ANNOYED YOU BY CRITICIZING YOUR DRINKING: NO
EVER HAD A DRINK FIRST THING IN THE MORNING TO STEADY YOUR NERVES TO GET RID OF A HANGOVER: NO
HAVE YOU EVER FELT YOU SHOULD CUT DOWN ON YOUR DRINKING: NO
REASON UNABLE TO ASSESS: YES

## 2023-10-29 ASSESSMENT — COLUMBIA-SUICIDE SEVERITY RATING SCALE - C-SSRS
2. HAVE YOU ACTUALLY HAD ANY THOUGHTS OF KILLING YOURSELF?: NO
1. IN THE PAST MONTH, HAVE YOU WISHED YOU WERE DEAD OR WISHED YOU COULD GO TO SLEEP AND NOT WAKE UP?: NO
6. HAVE YOU EVER DONE ANYTHING, STARTED TO DO ANYTHING, OR PREPARED TO DO ANYTHING TO END YOUR LIFE?: NO

## 2023-10-29 ASSESSMENT — PAIN - FUNCTIONAL ASSESSMENT: PAIN_FUNCTIONAL_ASSESSMENT: 0-10

## 2023-10-29 ASSESSMENT — PAIN SCALES - GENERAL: PAINLEVEL_OUTOF10: 3

## 2023-10-29 NOTE — ED PROVIDER NOTES
43-year-old female presents emergency department with chief complaint of abdominal pain.  Patient reports she has been having diffuse lower abdominal pain.  She states that she does have chronic pelvic pain and believes that some of this discomfort is just her usual chronic pain.  Patient also reports she has been having nausea, vomiting, and diarrhea for the past 2 days.  She states she ran out of her usual nausea medication and this led her to come here to the emergency department.  She denies any hematemesis or black or bloody stools.  She denies fevers, coughing, or congestion.  She denies any chest pain or difficulty breathing.  No dysuria or hematuria.  Patient denies any vaginal symptoms such as bleeding or discharge out of the norm.  She denies concern for pregnancy stating she has had hysterectomy.  Patient also has previous cholecystectomy. Chart review shows significant past medical history of anxiety, bladder disorder, depression, PTSD, GERD, irritable bowel syndrome with diarrhea, hypertension, elevated BMI, malignant neoplasm of the breast, and chronic pelvic pain.             ------------------------------------------------------------------------------------------------------------------------------------------    VS: As documented in the triage note and EMR flowsheet from this visit were reviewed.    Review of Systems  Constitutional: no fever, chills reports malaise and fatigue  Eyes: no redness, discharge, pain  HENT: no sore throat, nose bleeds, congestion, rhinorrhea   Cardiovascular: no chest pain, leg edema, palpitations  Respiratory: no shortness of breath, cough, wheezing  GI: Orts nausea, vomiting, diarrhea, and lower abdominal pain no constipation, BRBPR, melena  : no dysuria, frequency, hematuria, vaginal symptoms admits to chronic pelvic pain that she states is at baseline  Musculoskeletal: no neck pain, stiffness,  no joint deformity, swelling  Skin: no rash, erythema,  wounds  Neurological: no headache, dizziness, lightheadedness, weakness, numbness, or tingling  Psychiatric: no suicidal thoughts, confusion, agitation  Metabolic: no polyuria or polydipsia  Hematologic: no increased bleeding or bruising  Immunology: No immunocompromise state    [facility]  Nursing notes reviewed and confirmed by me.  Chart review performed including medications, allergies, and medical, surgical, and family history  Visit Vitals  /80   Pulse 89   Temp 36.5 °C (97.7 °F) (Tympanic)   Resp 18     Physical Exam  Vitals and nursing note reviewed.   Constitutional:       General: She is not in acute distress.     Appearance: Normal appearance. She is not ill-appearing.   HENT:      Head: Normocephalic and atraumatic.      Nose: Nose normal. No congestion or rhinorrhea.      Mouth/Throat:      Mouth: Mucous membranes are dry.      Pharynx: No oropharyngeal exudate or posterior oropharyngeal erythema.   Eyes:      Extraocular Movements: Extraocular movements intact.      Conjunctiva/sclera: Conjunctivae normal.      Pupils: Pupils are equal, round, and reactive to light.   Cardiovascular:      Rate and Rhythm: Normal rate and regular rhythm.      Pulses: Normal pulses.      Heart sounds: Normal heart sounds.   Pulmonary:      Effort: Pulmonary effort is normal. No respiratory distress.      Breath sounds: Normal breath sounds. No stridor. No wheezing, rhonchi or rales.   Abdominal:      General: There is no distension.      Palpations: Abdomen is soft.      Tenderness: There is abdominal tenderness (Diffuse lower abdominal tenderness seems to be worse in the right lower quadrant when compared to the left.). There is no guarding or rebound.   Musculoskeletal:         General: No swelling or deformity. Normal range of motion.      Cervical back: Normal range of motion and neck supple. No rigidity.      Right lower leg: No edema.      Left lower leg: No edema.      Comments: No calf tenderness    Skin:      General: Skin is warm and dry.      Capillary Refill: Capillary refill takes less than 2 seconds.      Coloration: Skin is not jaundiced.      Findings: No rash.   Neurological:      General: No focal deficit present.      Mental Status: She is alert and oriented to person, place, and time.      Sensory: No sensory deficit.      Motor: No weakness.   Psychiatric:         Mood and Affect: Mood normal.         Behavior: Behavior normal.        Past Medical History:   Diagnosis Date    Adjustment disorder 2000    Allergic 1995    Anxiety 1997    Chronic pain disorder 1/11/11    Depression 1997    Eczema 1991    GERD (gastroesophageal reflux disease) 1997    Headache     Liver disease 2022    Other abnormal auditory perceptions, unspecified ear 01/19/2018    Abnormal auditory perception    Other bursitis of knee, unspecified knee     Pes anserine bursitis    Panic attack     Panic disorder 1997    PTSD (post-traumatic stress disorder) 1/11/11    Sleep difficulties     Urinary tract infection 1997    Varicella 1986      Past Surgical History:   Procedure Laterality Date    CHOLECYSTECTOMY  2022    ENDOMETRIAL ABLATION  2009    HYSTERECTOMY  1/11/11    OTHER SURGICAL HISTORY  12/22/2022    Cystoscopy    OTHER SURGICAL HISTORY  12/22/2022    Vaginal lesion ablation    OTHER SURGICAL HISTORY  09/15/2022    Colonoscopy    OTHER SURGICAL HISTORY  09/15/2022    Esophagogastroduodenoscopy    OTHER SURGICAL HISTORY  02/16/2022    Cholecystectomy laparoscopic    OTHER SURGICAL HISTORY  06/14/2019    Tubal ligation bilateral    OTHER SURGICAL HISTORY  06/14/2019    Hysterectomy    TUBAL LIGATION      WISDOM TOOTH EXTRACTION  2000      Social History     Socioeconomic History    Marital status:      Spouse name: None    Number of children: None    Years of education: None    Highest education level: None   Occupational History    None   Tobacco Use    Smoking status: Every Day     Packs/day: 1.00     Years: 15.00      Additional pack years: 0.00     Total pack years: 15.00     Types: Cigarettes    Smokeless tobacco: Never   Vaping Use    Vaping Use: Never used   Substance and Sexual Activity    Alcohol use: Not Currently    Drug use: Not Currently    Sexual activity: Yes     Partners: Male     Birth control/protection: Other     Comment: Hysterectomy   Other Topics Concern    None   Social History Narrative    None     Social Determinants of Health     Financial Resource Strain: Not on file   Food Insecurity: Not on file   Transportation Needs: Not on file   Physical Activity: Not on file   Stress: Not on file   Social Connections: Not on file   Intimate Partner Violence: Not on file   Housing Stability: Not on file      ------------------------------------------------------------------------------------------------------------------------------------------  US PELVIS TRANSABDOMINAL WITH TRANSVAGINAL   Final Result   Previous hysterectomy.        Unable to identify the right ovary. This could be surgically absent.        There are 2 small simple cystic structures in the left ovary as   described. Both of these are probably mildly prominent follicles.        Remainder of the exam was negative.        Signed by: Wilfrid Cabrera 10/29/2023 7:00 PM   Dictation workstation:   [id number]      CT abdomen pelvis w IV contrast   Final Result   Previous hysterectomy. Previous cholecystectomy.        There are new cystic structures in the region of the left ovary and   the vaginal cuff as described. The cystic structure in the left ovary   could be a small cyst or prominent follicle. The cystic structure in   the vaginal cuff is of uncertain etiology or clinical significance.   Consider pelvic ultrasound in follow-up.        No CT evidence of bowel obstruction or inflammation in this exam.   Cecal appendix was unremarkable.        MACRO:   None        Signed by: Wilfrid Cabrera 10/29/2023 5:59 PM   Dictation workstation:   [id number]      XR  chest 1 view   Final Result   No acute cardiopulmonary process.        MACRO:   None        Signed by: Oliverio Osman 10/29/2023 5:02 PM   Dictation workstation:   [id number]         Labs Reviewed   CBC WITH AUTO DIFFERENTIAL - Abnormal       Result Value    WBC 12.6 (*)     nRBC 0.0      RBC 5.36 (*)     Hemoglobin 16.0      Hematocrit 46.6 (*)     MCV 87      MCH 29.9      MCHC 34.3      RDW 12.9      Platelets 252      MPV 10.1      Neutrophils % 68.8      Immature Granulocytes %, Automated 0.3      Lymphocytes % 25.1      Monocytes % 4.6      Eosinophils % 0.8      Basophils % 0.4      Neutrophils Absolute 8.67 (*)     Immature Granulocytes Absolute, Automated 0.04      Lymphocytes Absolute 3.16      Monocytes Absolute 0.58      Eosinophils Absolute 0.10      Basophils Absolute 0.05     COMPREHENSIVE METABOLIC PANEL - Abnormal    Glucose 100 (*)     Sodium 134 (*)     Potassium 3.5      Chloride 101      Bicarbonate 24      Anion Gap 13      Urea Nitrogen 11      Creatinine 0.78      eGFR >90      Calcium 10.0      Albumin 4.2      Alkaline Phosphatase 100      Total Protein 7.7      AST 29      Bilirubin, Total 0.6      ALT 35     URINALYSIS WITH REFLEX MICROSCOPIC AND CULTURE - Abnormal    Color, Urine Yellow      Appearance, Urine Hazy (*)     Specific Gravity, Urine 1.021      pH, Urine 6.0      Protein, Urine NEGATIVE      Glucose, Urine NEGATIVE      Blood, Urine NEGATIVE      Ketones, Urine NEGATIVE      Bilirubin, Urine NEGATIVE      Urobilinogen, Urine <2.0      Nitrite, Urine NEGATIVE      Leukocyte Esterase, Urine NEGATIVE     LACTATE - Normal    Lactate 1.0      Narrative:     Venipuncture immediately after or during the administration of Metamizole may lead to falsely low results. Testing should be performed immediately  prior to Metamizole dosing.   MAGNESIUM - Normal    Magnesium 1.88     LIPASE - Normal    Lipase 22      Narrative:     Venipuncture immediately after or during the administration  of Metamizole may lead to falsely low results. Testing should be performed immediately prior to Metamizole dosing.   SARS-COV-2 PCR, SYMPTOMATIC - Normal    Coronavirus 2019, PCR Not Detected      Narrative:     This assay has received FDA Emergency Use Authorization (EUA) and is only authorized for the duration of time that circumstances exist to justify the authorization of the emergency use of in vitro diagnostic tests for the detection of SARS-CoV-2 virus and/or diagnosis of COVID-19 infection under section 564(b)(1) of the Act, 21 U.S.C. 360bbb-3(b)(1). This assay is an in vitro diagnostic nucleic acid amplification test for the qualitative detection of SARS-CoV-2 from nasopharyngeal specimens and has been validated for use at [facility]. Negative results do not preclude COVID-19 infections and should not be used as the sole basis for diagnosis, treatment, or other management decisions.     INFLUENZA A AND B PCR - Normal    Flu A Result Not Detected      Flu B Result Not Detected      Narrative:     This assay is an in vitro diagnostic multiplex nucleic acid amplification test for the detection and discrimination of Influenza A & B from nasopharyngeal specimens, and has been validated for use at [facility]. Negative results do not preclude Influenza A/B infections, and should not be used as the sole basis for diagnosis, treatment, or other management decisions. If Influenza A/B and RSV PCR results are negative, testing for Parainfluenza virus, Adenovirus and Metapneumovirus is routinely performed for [facility] pediatric oncology and intensive care inpatients, and is available on other patients by placing an add-on request.   TROPONIN I, HIGH SENSITIVITY - Normal    Troponin I, High Sensitivity <3      Narrative:     Less than 99th percentile of normal range cutoff-  Female and children under 18 years old <14 ng/L; Male <21 ng/L: Negative  Repeat testing should be  performed if clinically indicated.     Female and children under 18 years old 14-50 ng/L; Male 21-50 ng/L:  Consistent with possible cardiac damage and possible increased clinical   risk. Serial measurements may help to assess extent of myocardial damage.     >50 ng/L: Consistent with cardiac damage, increased clinical risk and  myocardial infarction. Serial measurements may help assess extent of   myocardial damage.      NOTE: Children less than 1 year old may have higher baseline troponin   levels and results should be interpreted in conjunction with the overall   clinical context.     NOTE: Troponin I testing is performed using a different   testing methodology at [facility] than at other   [facility]. Direct result comparisons should only   be made within the same method.   URINALYSIS WITH REFLEX MICROSCOPIC AND CULTURE    Narrative:     The following orders were created for panel order Urinalysis with Reflex Microscopic and Culture.  Procedure                               Abnormality         Status                     ---------                               -----------         ------                     Urinalysis with Reflex M...[[id number]]  Abnormal            Final result               Extra Urine Gray Tube[[id number]]                            Final result                 Please view results for these tests on the individual orders.   EXTRA URINE GRAY TUBE    Extra Tube Hold for add-ons.          Medical Decision Making  EKG interpreted by ED physician: Normal sinus rhythm rate of 78.  HI, QRS, QTc intervals all within normal limits.  No significant ST elevations or depressions.  No significant Q waves.  Poor R wave progression.  Normal axis.    43-year-old female presents emergency department with chief complaints of abdominal pain, nausea, vomiting, and diarrhea.  Patient does admit to chronic abdominal pain and has history of IBS with diarrhea.  She does state that her diarrhea is  consistent with her chronic IBS.  She also reports chronic pelvic pain.  Patient reports that she recently ran out of her nausea medication and that is part of the reason why she is here today.  On my exam the patient is afebrile and nontoxic.  She is noted to have diffuse lower abdominal tenderness to palpation.  Given her presenting symptoms a thorough work-up is obtained.  UA does not show obvious findings of infection.  COVID and flu testing is negative.  Lipase within normal range I do not suspect pancreatitis.  CMP shows no significant metabolic abnormalities.  Lactate within normal range I do not suspect ischemic cause of her symptoms.  Cardiac enzyme and EKG do not show acute ACS.  CBC shows slight nonspecific leukocytosis and no significant anemia.  Patient does not meet sepsis.  Chest x-ray does not show any pulmonary edema, pleural effusion, or pneumonia.  CT abdomen pelvis identifies cystic structures in the pelvis, but no bowel obstruction or obvious intra-abdominal infection.  No findings consistent with appendicitis.  Subsequent ultrasound identifies normal flow to the left ovary and 2 simple cyst of the ovary.  Advised patient of this.  They were unable to visualize the right ovary and I did discuss this with the patient.  She states that this is normal for her and they have had difficulty visualizing on previous ultrasounds as well.  Patient was treated symptomatically with Benadryl, Reglan, and morphine.  On reevaluation her abdomen continues to be 9 and she reports improvement of her symptoms.  She states she is comfortable returning home.  Advised to follow-up with her primary care doctor, GI, and gynecology.  Patient given symptomatic treatment of Bentyl and Zofran for home.       Diagnoses as of 10/29/23 6693   Lower abdominal pain   Cyst of left ovary   Nausea vomiting and diarrhea      1. Lower abdominal pain  dicyclomine (Bentyl) 20 mg tablet      2. Cyst of left ovary        3. Nausea  vomiting and diarrhea  ondansetron ODT (Zofran-ODT) 8 mg disintegrating tablet         Procedures     This note was dictated using dragon software and may contain errors related to dictation interpretation errors.      Chip Eubanks DO  10/29/23 [id number]

## 2023-10-30 NOTE — DISCHARGE INSTRUCTIONS
Follow up with your primary care doctor, gynecologist, and GI. Return to the ER if symptoms worsen or change. Take medications as prescribed.

## 2023-10-31 ENCOUNTER — OFFICE VISIT (OUTPATIENT)
Dept: PRIMARY CARE | Facility: CLINIC | Age: 43
End: 2023-10-31
Payer: COMMERCIAL

## 2023-10-31 VITALS
OXYGEN SATURATION: 99 % | BODY MASS INDEX: 32.28 KG/M2 | DIASTOLIC BLOOD PRESSURE: 78 MMHG | WEIGHT: 194 LBS | HEART RATE: 112 BPM | SYSTOLIC BLOOD PRESSURE: 132 MMHG

## 2023-10-31 DIAGNOSIS — R10.30 LOWER ABDOMINAL PAIN: ICD-10-CM

## 2023-10-31 DIAGNOSIS — K58.0 IRRITABLE BOWEL SYNDROME WITH DIARRHEA: Primary | ICD-10-CM

## 2023-10-31 DIAGNOSIS — R19.7 NAUSEA VOMITING AND DIARRHEA: ICD-10-CM

## 2023-10-31 DIAGNOSIS — R11.2 NAUSEA VOMITING AND DIARRHEA: ICD-10-CM

## 2023-10-31 PROCEDURE — 3008F BODY MASS INDEX DOCD: CPT | Performed by: FAMILY MEDICINE

## 2023-10-31 PROCEDURE — 99214 OFFICE O/P EST MOD 30 MIN: CPT | Performed by: FAMILY MEDICINE

## 2023-10-31 RX ORDER — ONDANSETRON 8 MG/1
8 TABLET, ORALLY DISINTEGRATING ORAL EVERY 8 HOURS PRN
Qty: 30 TABLET | Refills: 0 | Status: SHIPPED | OUTPATIENT
Start: 2023-10-31 | End: 2023-12-11 | Stop reason: SDUPTHER

## 2023-10-31 RX ORDER — DICYCLOMINE HYDROCHLORIDE 20 MG/1
20 TABLET ORAL 2 TIMES DAILY
Qty: 10 TABLET | Refills: 0 | Status: SHIPPED | OUTPATIENT
Start: 2023-10-31 | End: 2023-11-02 | Stop reason: SDUPTHER

## 2023-10-31 ASSESSMENT — ENCOUNTER SYMPTOMS
SWEATS: 1
DIARRHEA: 1
ABDOMINAL PAIN: 1
FEVER: 0
VOMITING: 1

## 2023-10-31 NOTE — PROGRESS NOTES
CHIEF COMPLAINT         Left hand and wrist pain    ASSESSMENT + PLAN    Left carpal tunnel syndrome with positive EMG and early APB change    The nature of carpal tunnel syndrome was reviewed, along with the slowly progressive natural history.  The options for management were reviewed, including night splinting, cortisone injection, or surgical carpal tunnel release.  The major benefits and risks of surgery were specifically reviewed, as was the postoperative rehabilitation course.    The patient does want to go ahead with surgery and will contact the office to schedule an exact surgical date.  The procedure will be done under sedation and local, at the location of her convenience.        HISTORY OF PRESENT ILLNESS       Patient is a 43 y.o. female on disability, who presents today at suggestion of Dr. Roman, for evaluation of about 8 months of left hand and wrist pain from carpal tunnel syndrome.  There was no particular trauma around time of onset.  This initially would wake her from sleep with a positive shake sign.  She has been using a brace but it is no longer helping.  She had carpal tunnel surgery by Dr. Kim in [place] on the right for similar symptoms.  She reports that tingling from that side is much better, though not yet gone.  She has had an EMG, summarized below.  She wants to proceed with carpal tunnel surgery on the left.    She is not diabetic or hypothyroid.  She does not smoke.  She does have depression and PTSD.  She is followed by Pain Management.  She takes gabapentin 1200.      REVIEW OF SYSTEMS       A 30-item multi-system Review Of Systems was obtained on today's intake form.  This was reviewed with the patient and is correct.  The pertinent positives and negatives are listed above.  The form has been scanned separately into the medical record.      PHYSICAL EXAM    Constitutional:    Appears stated age. Well-developed and well-nourished obese female in no acute distress.  Psychiatric:          Pleasant normal mood and affect. Behavior is appropriate for the situation.   Head:                   Normocephalic and atraumatic.  Eyes:                    Pupils are equal and round.  Cardiovascular:  2+ radial and ulnar pulses. Fingers well-perfused.  Respiratory:        Effort normal. No respiratory distress. Speaking in complete sentences.  Neurologic:       Alert and oriented to person, place, and time.  Skin:                Skin is intact, warm and dry.  Hematologic / Lymphatic:    No lymphedema or lymphangitis.    Extremities / Musculoskeletal:                      Skin of the left hand and wrist is intact with no erythema, ecchymosis, or diffuse swelling.  Normal skin drag and coloration.  Full composite finger flexion extension with full intrinsic plus minus posture.  Symmetric wrist and forearm motion.  5/5 APB and hand intrinsics with no wasting.  Cervical range of motion is good and does not reproduce chief complaint.  Negative Lita.  Negative Spurling.  Positive Phalen and Durkan at the wrist but negative Tinel at wrist and elbow.  Negative elbow flexion test.  Sensation intact to light touch in all distributions.  Capillary refill less than 2 seconds.      IMAGING / LABS / EMGs           EMG from February 21 is positive for carpal tunnel syndrome with early APB motor change bilaterally.  This was prior to her carpal tunnel release on the right by Dr. Kim.      Past Medical History:   Diagnosis Date    Adjustment disorder 2000    Allergic 1995    Anxiety 1997    Chronic pain disorder 1/11/11    Depression 1997    Eczema 1991    GERD (gastroesophageal reflux disease) 1997    Headache     Liver disease 2022    Other abnormal auditory perceptions, unspecified ear 01/19/2018    Abnormal auditory perception    Other bursitis of knee, unspecified knee     Pes anserine bursitis    Panic attack     Panic disorder 1997    PTSD (post-traumatic stress disorder) 1/11/11    Sleep difficulties     Urinary  tract infection     Varicella        Medication Documentation Review Audit       Reviewed by Torin Cole MD (Physician) on 10/31/23 at 0718      Medication Order Taking? Sig Documenting Provider Last Dose Status   ALPRAZolam (Xanax) 0.25 mg tablet [id number]  Take 1 tablet (0.25 mg) by mouth once daily as needed for anxiety for up to 20 days. PABLO Joseph-CNP, APRN-CNS   10/24/23 2359   Discontinued 10/25/23 1645   amitriptyline (Elavil) 10 mg tablet [id number]  Take 2 tablets (20 mg) by mouth once daily at bedtime. Jean Roman DO  Active   beclomethasone (QNASL) 80 mcg/actuation HFA aerosol inhaler [id number] No Administer 2 sprays into each nostril once daily. Jean Roman DO Taking Active   clobetasoL-calcipotriene 0.05-0.005 % solution [id number] No APPLY AND GENTLY MASSAGE INTO AFFECTED AREA(S) ONCE DAILY Historical Provider, MD Taking Active   diazePAM (Valium) 2 mg tablet [id number]  Take 1 tablet (2 mg) by mouth 1 time for 1 dose. Take 30 to 60 minutes prior to MRI Jean Roman DO   10/23/23 235   dicyclomine (Bentyl) 20 mg tablet [id number]  Take 1 tablet (20 mg) by mouth 2 times a day for 5 days. Chip Eubanks, DO  Active   DULoxetine (Cymbalta) 60 mg DR capsule [id number]  Take 1 capsule (60 mg) by mouth 2 times a day. PEDRO JosephCNP, APRN-CNS  Active   ergocalciferol (Vitamin D-2) 1.25 MG (53273 UT) capsule [id number] No Take 1 capsule (1,250 mcg) by mouth once a week. Historical Provider, MD Taking Active   gabapentin (Neurontin) 100 mg capsule [id number] No Take 2 capsules (200 mg) by mouth 2 times a day. Take 200 mg in the morning and 200 mg in the afternoon Jean Roman DO Taking  10/21/23 2359   gabapentin (Neurontin) 100 mg capsule [id number] No 1 CAP QAM AND 3 CAPS at bedtime WITH 800 MG CAP Historical Provider, MD Not Taking Active   gabapentin (Neurontin) 800 mg tablet [id number] No Take 1 tablet (800 mg) by mouth once daily at bedtime. Jean Roman DO  Taking  10/21/23 4286   GAS RELIEF, SIMETHICONE, ORAL [id number] No  Historical Provider, MD Taking Active   lamoTRIgine (LaMICtal) 100 mg tablet [id number]  Take 1 tablet (100 mg) by mouth once daily. With 25 mg for a total dose of 125 mg PABLO Joseph-CNP, APRN-CNS  Active   lamoTRIgine (LaMICtal) 25 mg tablet [id number]  Take 1 tablet (25 mg) by mouth once daily. With 100 mg tablet for a total dose of 125mg CHRISTINA Joseph, APRN-CNS  Active   omeprazole (PriLOSEC) 40 mg DR capsule [id number] No Take 1 capsule (40 mg) by mouth once daily in the morning. Take before meals. 1/2 hr prior to breakfast Historical Provider, MD Taking Active   ondansetron ODT (Zofran-ODT) 8 mg disintegrating tablet [id number] No DISSOLVE 1 TABLET ON THE TONGUE EVERY 8 HOURS AS NEEDED Historical Provider, MD Taking Active   ondansetron ODT (Zofran-ODT) 8 mg disintegrating tablet [id number]  Take 1 tablet (8 mg) by mouth every 8 hours if needed for nausea or vomiting for up to 3 days. Chip Eubanks,   Active   oxybutynin XL (Ditropan-XL) 15 mg 24 hr tablet [id number] No  Historical Provider, MD Taking Active   traZODone (Desyrel) 50 mg tablet [id number]  Take 1-2 tablets ( mg) by mouth once daily at bedtime. CHRISTINA Joseph, APRN-CNS  Active                    Allergies   Allergen Reactions    Azithromycin Unknown    Penicillins Unknown, Diarrhea and Hives    Trospium Hives    Adhesive Tape-Silicones Rash    Aloe Vera Hives and Rash    Erythromycin Diarrhea, Hives, Other and Rash    Latex Hives, Rash and Swelling     Per pt latex tape       Social History     Socioeconomic History    Marital status:      Spouse name: Not on file    Number of children: Not on file    Years of education: Not on file    Highest education level: Not on file   Occupational History    Not on file   Tobacco Use    Smoking status: Every Day     Packs/day: 1.00     Years: 15.00     Additional pack years: 0.00     Total pack  years: 15.00     Types: Cigarettes    Smokeless tobacco: Never   Vaping Use    Vaping Use: Never used   Substance and Sexual Activity    Alcohol use: Not Currently    Drug use: Not Currently    Sexual activity: Yes     Partners: Male     Birth control/protection: Other     Comment: Hysterectomy   Other Topics Concern    Not on file   Social History Narrative    Not on file     Social Determinants of Health     Financial Resource Strain: Not on file   Food Insecurity: Not on file   Transportation Needs: Not on file   Physical Activity: Not on file   Stress: Not on file   Social Connections: Not on file   Intimate Partner Violence: Not on file   Housing Stability: Not on file       Past Surgical History:   Procedure Laterality Date    CHOLECYSTECTOMY  2022    ENDOMETRIAL ABLATION  2009    HYSTERECTOMY  1/11/11    OTHER SURGICAL HISTORY  12/22/2022    Cystoscopy    OTHER SURGICAL HISTORY  12/22/2022    Vaginal lesion ablation    OTHER SURGICAL HISTORY  09/15/2022    Colonoscopy    OTHER SURGICAL HISTORY  09/15/2022    Esophagogastroduodenoscopy    OTHER SURGICAL HISTORY  02/16/2022    Cholecystectomy laparoscopic    OTHER SURGICAL HISTORY  06/14/2019    Tubal ligation bilateral    OTHER SURGICAL HISTORY  06/14/2019    Hysterectomy    TUBAL LIGATION      WISDOM TOOTH EXTRACTION  2000         Electronically Signed      JOELLEN Cole MD      Orthopaedic Hand Surgery      [phone]

## 2023-10-31 NOTE — PROGRESS NOTES
Subjective   Patient ID: Martina Blackwood is a 43 y.o. female who presents for Nausea and Vomiting.    Vomiting   This is a new problem. The current episode started in the past 7 days (Onset Thursday). The problem has been unchanged. There has been no fever. Associated symptoms include abdominal pain, diarrhea and sweats. Pertinent negatives include no fever.      Here today for ED follow-up for IBS  Past medical history is significant for a history of chronic abdominal cramping and diarrhea.  She has been diagnosed with IBS-D in the past. She does have a history of lymphocytic colitis seen on colonoscopy in 2021.  She had been on budesonide in the past but is not currently taking because it was not effective    She went to the ED on 10/29 with a 2-day history of worsening diffuse abdominal cramping, nausea, intermittent vomiting, and frequent watery diarrhea.  She had been taking Zofran as needed but had run out of this medication.  I reviewed records from the ED.  There were no acute findings seen on CT abdomen/pelvis.  CBC showed mild leukocytosis with white blood cell count of 12.6.  She was discharged home with prescriptions for Bentyl and Zofran  She has been taking the Zofran and feels that it has been helping with the nausea.  She is still having abdominal cramping and diarrhea.  The abdominal cramping will improve from a 6 out of 10 intensity to a 3 out of 10 intensity when she takes the Bentyl.  Diarrhea will occur up to 7-8 times per day.  No blood in stool.  No fever  She has a history of similar flareups in the past.  This will typically occur every 6 to 8 weeks and last up to 10 to 14 days at a time.  Her last flareup was in July        Review of Systems   Constitutional:  Negative for fever.   Gastrointestinal:  Positive for abdominal pain, diarrhea and vomiting.       Objective   /78   Pulse (!) 112   Wt 88 kg (194 lb)   SpO2 99%   BMI 32.28 kg/m²     Physical Exam  Vitals reviewed.    Constitutional:       General: She is not in acute distress.     Appearance: Normal appearance. She is well-developed.   HENT:      Head: Normocephalic.   Eyes:      Conjunctiva/sclera: Conjunctivae normal.   Cardiovascular:      Rate and Rhythm: Normal rate and regular rhythm.      Heart sounds: Normal heart sounds.   Pulmonary:      Effort: Pulmonary effort is normal.      Breath sounds: Normal breath sounds.   Abdominal:      Palpations: Abdomen is soft.      Tenderness: There is abdominal tenderness.      Comments: Diffuse abdominal tenderness.  Abdomen is soft and there is no guarding, rebound or rigidity   Skin:     Findings: No rash.   Neurological:      Mental Status: She is alert.   Psychiatric:         Mood and Affect: Mood normal.         Behavior: Behavior normal.         Assessment/Plan   Problem List Items Addressed This Visit          Medium    Irritable bowel syndrome with diarrhea - Primary    Relevant Medications    rifAXIMin (Xifaxan) 550 mg tablet     Other Visit Diagnoses       Nausea vomiting and diarrhea        Relevant Medications    ondansetron ODT (Zofran-ODT) 8 mg disintegrating tablet        She presents today for follow-up ED 10/29/2023 for a 2-day history of worsening nausea/vomiting, diarrhea and abdominal cramping, after running out of Zofran.  She has since restarted Zofran and nausea has been improving.  I recommended that she continue this medication, as well as dicyclomine for abdominal cramping and diarrhea.  I did review her previous records, and we had previously treated her with a 14-day course of Xifaxan 550 mg 3 times daily for an IBS flareup in 2021 which had helped with her symptoms.  We will retreat with this antibiotic.Advised to return to ER if any severe worsening symptoms.  Otherwise she is going to be following up with a new gastroenterologist to further discuss her symptoms

## 2023-10-31 NOTE — TELEPHONE ENCOUNTER
Rx Refill Request Telephone Encounter    Name:  Jennifer Blackwood  :  [id number]  Medication Name: Bentyl refill request

## 2023-11-01 DIAGNOSIS — G56.02 CARPAL TUNNEL SYNDROME, LEFT: ICD-10-CM

## 2023-11-02 DIAGNOSIS — R10.30 LOWER ABDOMINAL PAIN: ICD-10-CM

## 2023-11-02 DIAGNOSIS — N76.0 ACUTE VAGINITIS: Primary | ICD-10-CM

## 2023-11-02 RX ORDER — DICYCLOMINE HYDROCHLORIDE 20 MG/1
20 TABLET ORAL 4 TIMES DAILY PRN
Qty: 30 TABLET | Refills: 2 | Status: SHIPPED | OUTPATIENT
Start: 2023-11-02 | End: 2024-05-08

## 2023-11-02 RX ORDER — FLUCONAZOLE 150 MG/1
150 TABLET ORAL ONCE
Qty: 2 TABLET | Refills: 0 | Status: SHIPPED | OUTPATIENT
Start: 2023-11-02 | End: 2023-11-08

## 2023-11-05 PROBLEM — G56.02 CARPAL TUNNEL SYNDROME, LEFT: Status: ACTIVE | Noted: 2023-11-01

## 2023-11-07 ENCOUNTER — TELEMEDICINE (OUTPATIENT)
Dept: BEHAVIORAL HEALTH | Facility: CLINIC | Age: 43
End: 2023-11-07
Payer: COMMERCIAL

## 2023-11-07 DIAGNOSIS — F41.1 GENERALIZED ANXIETY DISORDER: ICD-10-CM

## 2023-11-07 DIAGNOSIS — F33.1 MODERATE EPISODE OF RECURRENT MAJOR DEPRESSIVE DISORDER (MULTI): ICD-10-CM

## 2023-11-07 DIAGNOSIS — F43.10 PTSD (POST-TRAUMATIC STRESS DISORDER): ICD-10-CM

## 2023-11-07 PROCEDURE — 99213 OFFICE O/P EST LOW 20 MIN: CPT | Performed by: CLINICAL NURSE SPECIALIST
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
## 2023-11-08 ENCOUNTER — OFFICE VISIT (OUTPATIENT)
Dept: PRIMARY CARE | Facility: CLINIC | Age: 43
End: 2023-11-08
Payer: COMMERCIAL

## 2023-11-08 VITALS
HEART RATE: 106 BPM | DIASTOLIC BLOOD PRESSURE: 87 MMHG | SYSTOLIC BLOOD PRESSURE: 136 MMHG | WEIGHT: 197 LBS | BODY MASS INDEX: 32.78 KG/M2 | OXYGEN SATURATION: 98 %

## 2023-11-08 DIAGNOSIS — Z23 INFLUENZA VACCINE NEEDED: ICD-10-CM

## 2023-11-08 DIAGNOSIS — K58.0 IRRITABLE BOWEL SYNDROME WITH DIARRHEA: ICD-10-CM

## 2023-11-08 DIAGNOSIS — G62.9 NEUROPATHY: Primary | ICD-10-CM

## 2023-11-08 DIAGNOSIS — F43.10 PTSD (POST-TRAUMATIC STRESS DISORDER): ICD-10-CM

## 2023-11-08 DIAGNOSIS — R51.9 NEW ONSET HEADACHE: ICD-10-CM

## 2023-11-08 PROBLEM — J06.9 ACUTE URI: Status: RESOLVED | Noted: 2023-08-18 | Resolved: 2023-11-08

## 2023-11-08 PROBLEM — R74.01 ELEVATED TRANSAMINASE LEVEL: Status: RESOLVED | Noted: 2023-10-02 | Resolved: 2023-11-08

## 2023-11-08 PROCEDURE — 99214 OFFICE O/P EST MOD 30 MIN: CPT | Performed by: FAMILY MEDICINE

## 2023-11-08 PROCEDURE — 3008F BODY MASS INDEX DOCD: CPT | Performed by: FAMILY MEDICINE

## 2023-11-08 PROCEDURE — 90686 IIV4 VACC NO PRSV 0.5 ML IM: CPT | Performed by: FAMILY MEDICINE

## 2023-11-08 PROCEDURE — 90471 IMMUNIZATION ADMIN: CPT | Performed by: FAMILY MEDICINE

## 2023-11-08 RX ORDER — GABAPENTIN 400 MG/1
1200 CAPSULE ORAL NIGHTLY
Qty: 90 CAPSULE | Refills: 5 | Status: SHIPPED | OUTPATIENT
Start: 2023-11-08 | End: 2024-05-08 | Stop reason: SDUPTHER

## 2023-11-08 RX ORDER — DULOXETIN HYDROCHLORIDE 60 MG/1
60 CAPSULE, DELAYED RELEASE ORAL DAILY
Qty: 30 CAPSULE | Refills: 1
Start: 2023-11-08 | End: 2023-11-30

## 2023-11-08 ASSESSMENT — ENCOUNTER SYMPTOMS
BACK PAIN: 0
NUMBNESS: 1
COUGH: 0
FEVER: 0

## 2023-11-08 NOTE — PROGRESS NOTES
Subjective   Patient ID: Martina Blackwood is a 43 y.o. female who presents for Follow-up.    HPI       She is here today for her 6-month follow-up on chronic neuropathy  We did recently see her on 10/31.  At that time she was having a flareup of IBS-D after being seen in the ED with a 2-day history of increased nausea/vomiting, cramping and diarrhea.  At that time we started her on a 2-week course of Xifaxan.  She states that this medication has been helping.  Nausea and vomiting is 85% better, and cramping and diarrhea are approximately 70% better.  She is going to be establishing with a new gastroenterologist  She is scheduled to establish with neurology in approximately 2 weeks to discuss headaches which have now been present for approximately 2.5 to 3 months.  These are still bilateral and involve her entire head.  She has been taking amitriptyline 20 mg nightly with approximately 15% improvement when taking this medication.  Recent brain MRI done 10/3/2023 was unremarkable other than a single focus of nonspecific hyperintensity located within the left centrum semiovale, possibly reflecting gliosis and of unclear significance  She has a history of chronic bilateral lower extremity neuropathy which has been present since she was a teenager and slowly progressively worsening.  This was previously worked up by her last PCP, and she most recently had a lower extremity EMG done in 2018 that showed mild bilateral L5 radiculopathy.  She has continued to have paresthesias in both lower extremities.  This extends up to her mid thighs.  She has been taking gabapentin for several years.  She had previously been taking 1200 mg nightly which had seemed to help.  She has been following with psychiatry for MDD, ANTONY and PTSD, and they had recommended trying to adjust her dose in case withdrawal from gabapentin was causing worsening anxiety during the day.  She had initially tried 200 mg in the morning, 200 in the afternoon, and  800 mg in the evening, followed by 100 mg in the morning and 1100 mg in the evening.  She did not notice any improvement in her anxiety with either of these regimens, and also feels that she has had worsening neuropathy symptoms, and would like to adjust her dose back to 1200 mg nightly.  She had tolerated this dose well without any adverse effects.  She estimates that her neuropathy pain will improved by 80 to 90% with the 1200 mg gabapentin dose.  She has noticed only 65% improvement when taking the 1100 mg gabapentin dose.  Pain involves both legs to her mid thigh, and typically is a 7 out of 10 intensity pain.  Denies any back pain        Review of Systems   Constitutional:  Negative for fever.   Respiratory:  Negative for cough.    Cardiovascular:  Negative for leg swelling.   Musculoskeletal:  Negative for back pain.   Neurological:  Positive for numbness.       Objective   /87   Pulse 106   Wt 89.4 kg (197 lb)   SpO2 98%   BMI 32.78 kg/m²     Physical Exam  Vitals reviewed.   Constitutional:       General: She is not in acute distress.     Appearance: Normal appearance. She is well-developed.   HENT:      Head: Normocephalic.   Eyes:      Conjunctiva/sclera: Conjunctivae normal.   Cardiovascular:      Rate and Rhythm: Normal rate and regular rhythm.      Heart sounds: Normal heart sounds.   Pulmonary:      Effort: Pulmonary effort is normal.      Breath sounds: Normal breath sounds.   Musculoskeletal:      Right lower leg: No edema.      Left lower leg: No edema.   Skin:     Findings: No rash.   Neurological:      Mental Status: She is alert.      Sensory: No sensory deficit.      Motor: No weakness.      Deep Tendon Reflexes: Reflexes normal.   Psychiatric:         Mood and Affect: Mood normal.         Behavior: Behavior normal.         Assessment/Plan   Problem List Items Addressed This Visit          Medium    Generalized anxiety disorder    Relevant Medications    DULoxetine (Cymbalta) 60 mg   capsule    Irritable bowel syndrome with diarrhea    Relevant Orders    Referral to Gastroenterology    Moderate episode of recurrent major depressive disorder (CMS/HCC)    Relevant Medications    DULoxetine (Cymbalta) 60 mg DR capsule    Neuropathy - Primary    Relevant Medications    gabapentin (Neurontin) 400 mg capsule    PTSD (post-traumatic stress disorder)    Relevant Medications    DULoxetine (Cymbalta) 60 mg DR capsule   #1 chronic bilateral lower extremity neuropathy  She developed worsening neuropathy symptoms after adjusting her dose of gabapentin, and would like to switch back to her previous dose of 1200 mg nightly.  This worked adequately to help control her pain, and she did not have any adverse effects when taking.  We will stop her previous dose and restart gabapentin 1200 mg nightly.  OARRS reviewed.  She previously signed a CSA 5/19/2023.  Follow-up in 6 months for recheck and CPE  An OARRS report was printed and I have personally reviewed the results.  The report will be scanned into the health record.  I have considered the risk of abuse, dependance, addiction, and diversion.  I believe it is clinically appropriate for this patient to continue taking this medication.  I also did recommend that she discuss her neuropathy further when she sees neurology later this month  She does report significant improvement in her IBS-D symptoms with Xifaxan.  She is going to be seeing a new gastroenterologist, and we will consider this medication again in the future if she has any further flareups  Continue amitriptyline for headaches.  She will be seeing neurology later this month to discuss further

## 2023-11-09 ENCOUNTER — ANESTHESIA EVENT (OUTPATIENT)
Dept: OPERATING ROOM | Facility: CLINIC | Age: 43
End: 2023-11-09
Payer: COMMERCIAL

## 2023-11-10 ENCOUNTER — HOSPITAL ENCOUNTER (OUTPATIENT)
Dept: CARDIOLOGY | Facility: HOSPITAL | Age: 43
Discharge: HOME | End: 2023-11-10
Payer: COMMERCIAL

## 2023-11-10 PROCEDURE — 93005 ELECTROCARDIOGRAM TRACING: CPT

## 2023-11-11 RX ORDER — CEFAZOLIN SODIUM 2 G/100ML
2 INJECTION, SOLUTION INTRAVENOUS ONCE
Status: CANCELLED | OUTPATIENT
Start: 2023-11-11 | End: 2023-11-11

## 2023-11-13 ENCOUNTER — HOSPITAL ENCOUNTER (OUTPATIENT)
Facility: CLINIC | Age: 43
Setting detail: OUTPATIENT SURGERY
Discharge: HOME | End: 2023-11-13
Attending: ORTHOPAEDIC SURGERY | Admitting: ORTHOPAEDIC SURGERY
Payer: COMMERCIAL

## 2023-11-13 ENCOUNTER — ANESTHESIA (OUTPATIENT)
Dept: OPERATING ROOM | Facility: CLINIC | Age: 43
End: 2023-11-13
Payer: COMMERCIAL

## 2023-11-13 VITALS
TEMPERATURE: 96.8 F | HEART RATE: 89 BPM | WEIGHT: 198.19 LBS | DIASTOLIC BLOOD PRESSURE: 65 MMHG | BODY MASS INDEX: 33.02 KG/M2 | OXYGEN SATURATION: 97 % | RESPIRATION RATE: 16 BRPM | SYSTOLIC BLOOD PRESSURE: 112 MMHG | HEIGHT: 65 IN

## 2023-11-13 DIAGNOSIS — G56.02 CARPAL TUNNEL SYNDROME, LEFT: Primary | ICD-10-CM

## 2023-11-13 PROBLEM — G70.00 MYASTHENIA GRAVIS (MULTI): Status: ACTIVE | Noted: 2023-11-13

## 2023-11-13 PROCEDURE — 94760 N-INVAS EAR/PLS OXIMETRY 1: CPT | Mod: CCI

## 2023-11-13 PROCEDURE — 64721 CARPAL TUNNEL SURGERY: CPT | Performed by: ORTHOPAEDIC SURGERY

## 2023-11-13 PROCEDURE — A64721 PR REVISE MEDIAN N/CARPAL TUNNEL SURG: Performed by: ANESTHESIOLOGY

## 2023-11-13 PROCEDURE — 3600000003 HC OR TIME - INITIAL BASE CHARGE - PROCEDURE LEVEL THREE: Performed by: ORTHOPAEDIC SURGERY

## 2023-11-13 PROCEDURE — 2500000005 HC RX 250 GENERAL PHARMACY W/O HCPCS: Performed by: ORTHOPAEDIC SURGERY

## 2023-11-13 PROCEDURE — 3600000008 HC OR TIME - EACH INCREMENTAL 1 MINUTE - PROCEDURE LEVEL THREE: Performed by: ORTHOPAEDIC SURGERY

## 2023-11-13 PROCEDURE — 3700000001 HC GENERAL ANESTHESIA TIME - INITIAL BASE CHARGE: Performed by: ORTHOPAEDIC SURGERY

## 2023-11-13 PROCEDURE — A64721 PR REVISE MEDIAN N/CARPAL TUNNEL SURG: Performed by: ANESTHESIOLOGIST ASSISTANT

## 2023-11-13 PROCEDURE — 2500000004 HC RX 250 GENERAL PHARMACY W/ HCPCS (ALT 636 FOR OP/ED): Performed by: ORTHOPAEDIC SURGERY

## 2023-11-13 PROCEDURE — 2500000004 HC RX 250 GENERAL PHARMACY W/ HCPCS (ALT 636 FOR OP/ED): Performed by: ANESTHESIOLOGIST ASSISTANT

## 2023-11-13 PROCEDURE — 7100000010 HC PHASE TWO TIME - EACH INCREMENTAL 1 MINUTE: Performed by: ORTHOPAEDIC SURGERY

## 2023-11-13 PROCEDURE — 7100000009 HC PHASE TWO TIME - INITIAL BASE CHARGE: Performed by: ORTHOPAEDIC SURGERY

## 2023-11-13 PROCEDURE — 3700000002 HC GENERAL ANESTHESIA TIME - EACH INCREMENTAL 1 MINUTE: Performed by: ORTHOPAEDIC SURGERY

## 2023-11-13 RX ORDER — HYDROMORPHONE HYDROCHLORIDE 1 MG/ML
0.5 INJECTION, SOLUTION INTRAMUSCULAR; INTRAVENOUS; SUBCUTANEOUS EVERY 5 MIN PRN
Status: DISCONTINUED | OUTPATIENT
Start: 2023-11-13 | End: 2023-11-13 | Stop reason: HOSPADM

## 2023-11-13 RX ORDER — ACETAMINOPHEN 325 MG/1
TABLET ORAL AS NEEDED
Status: DISCONTINUED | OUTPATIENT
Start: 2023-11-13 | End: 2023-11-13

## 2023-11-13 RX ORDER — ONDANSETRON HYDROCHLORIDE 2 MG/ML
4 INJECTION, SOLUTION INTRAVENOUS ONCE AS NEEDED
Status: DISCONTINUED | OUTPATIENT
Start: 2023-11-13 | End: 2023-11-13 | Stop reason: HOSPADM

## 2023-11-13 RX ORDER — PROPOFOL 10 MG/ML
INJECTION, EMULSION INTRAVENOUS CONTINUOUS PRN
Status: DISCONTINUED | OUTPATIENT
Start: 2023-11-13 | End: 2023-11-13

## 2023-11-13 RX ORDER — CEFAZOLIN 1 G/1
INJECTION, POWDER, FOR SOLUTION INTRAVENOUS AS NEEDED
Status: DISCONTINUED | OUTPATIENT
Start: 2023-11-13 | End: 2023-11-13

## 2023-11-13 RX ORDER — LABETALOL HYDROCHLORIDE 5 MG/ML
5 INJECTION, SOLUTION INTRAVENOUS ONCE AS NEEDED
Status: DISCONTINUED | OUTPATIENT
Start: 2023-11-13 | End: 2023-11-13 | Stop reason: HOSPADM

## 2023-11-13 RX ORDER — HYDROCODONE BITARTRATE AND ACETAMINOPHEN 5; 325 MG/1; MG/1
1 TABLET ORAL EVERY 6 HOURS PRN
Qty: 14 TABLET | Refills: 0 | Status: SHIPPED | OUTPATIENT
Start: 2023-11-13 | End: 2023-11-30 | Stop reason: WASHOUT

## 2023-11-13 RX ORDER — SODIUM CHLORIDE, SODIUM LACTATE, POTASSIUM CHLORIDE, CALCIUM CHLORIDE 600; 310; 30; 20 MG/100ML; MG/100ML; MG/100ML; MG/100ML
100 INJECTION, SOLUTION INTRAVENOUS CONTINUOUS
Status: DISCONTINUED | OUTPATIENT
Start: 2023-11-13 | End: 2023-11-13 | Stop reason: HOSPADM

## 2023-11-13 RX ORDER — ALBUTEROL SULFATE 0.83 MG/ML
2.5 SOLUTION RESPIRATORY (INHALATION) ONCE AS NEEDED
Status: DISCONTINUED | OUTPATIENT
Start: 2023-11-13 | End: 2023-11-13 | Stop reason: HOSPADM

## 2023-11-13 RX ORDER — LIDOCAINE HYDROCHLORIDE 10 MG/ML
INJECTION INFILTRATION; PERINEURAL AS NEEDED
Status: DISCONTINUED | OUTPATIENT
Start: 2023-11-13 | End: 2023-11-13 | Stop reason: HOSPADM

## 2023-11-13 RX ORDER — FENTANYL CITRATE 50 UG/ML
INJECTION, SOLUTION INTRAMUSCULAR; INTRAVENOUS AS NEEDED
Status: DISCONTINUED | OUTPATIENT
Start: 2023-11-13 | End: 2023-11-13

## 2023-11-13 RX ORDER — MIDAZOLAM HYDROCHLORIDE 1 MG/ML
INJECTION, SOLUTION INTRAMUSCULAR; INTRAVENOUS AS NEEDED
Status: DISCONTINUED | OUTPATIENT
Start: 2023-11-13 | End: 2023-11-13

## 2023-11-13 RX ORDER — OXYCODONE HYDROCHLORIDE 5 MG/1
5 TABLET ORAL EVERY 4 HOURS PRN
Status: DISCONTINUED | OUTPATIENT
Start: 2023-11-13 | End: 2023-11-13 | Stop reason: HOSPADM

## 2023-11-13 RX ORDER — BUPIVACAINE HYDROCHLORIDE 5 MG/ML
INJECTION, SOLUTION EPIDURAL; INTRACAUDAL AS NEEDED
Status: DISCONTINUED | OUTPATIENT
Start: 2023-11-13 | End: 2023-11-13 | Stop reason: HOSPADM

## 2023-11-13 RX ORDER — LIDOCAINE HYDROCHLORIDE 10 MG/ML
0.1 INJECTION, SOLUTION EPIDURAL; INFILTRATION; INTRACAUDAL; PERINEURAL ONCE
Status: DISCONTINUED | OUTPATIENT
Start: 2023-11-13 | End: 2023-11-13 | Stop reason: HOSPADM

## 2023-11-13 RX ADMIN — FENTANYL CITRATE 50 MCG: 50 INJECTION, SOLUTION INTRAMUSCULAR; INTRAVENOUS at 07:41

## 2023-11-13 RX ADMIN — ACETAMINOPHEN 975 MG: 325 TABLET ORAL at 07:20

## 2023-11-13 RX ADMIN — MIDAZOLAM 2 MG: 1 INJECTION INTRAMUSCULAR; INTRAVENOUS at 07:35

## 2023-11-13 RX ADMIN — SODIUM CHLORIDE, SODIUM LACTATE, POTASSIUM CHLORIDE, AND CALCIUM CHLORIDE: .6; .31; .03; .02 INJECTION, SOLUTION INTRAVENOUS at 07:30

## 2023-11-13 RX ADMIN — CEFAZOLIN 2 G: 1 INJECTION, POWDER, FOR SOLUTION INTRAMUSCULAR; INTRAVENOUS at 07:36

## 2023-11-13 RX ADMIN — FENTANYL CITRATE 50 MCG: 50 INJECTION, SOLUTION INTRAMUSCULAR; INTRAVENOUS at 07:35

## 2023-11-13 RX ADMIN — PROPOFOL 100 MCG/KG/MIN: 10 INJECTION, EMULSION INTRAVENOUS at 07:37

## 2023-11-13 ASSESSMENT — PAIN SCALES - GENERAL
PAINLEVEL_OUTOF10: 2
PAINLEVEL_OUTOF10: 2
PAIN_LEVEL: 2
PAINLEVEL_OUTOF10: 2
PAINLEVEL_OUTOF10: 0 - NO PAIN

## 2023-11-13 ASSESSMENT — PAIN - FUNCTIONAL ASSESSMENT
PAIN_FUNCTIONAL_ASSESSMENT: 0-10
PAIN_FUNCTIONAL_ASSESSMENT: 0-10

## 2023-11-13 ASSESSMENT — COLUMBIA-SUICIDE SEVERITY RATING SCALE - C-SSRS
1. IN THE PAST MONTH, HAVE YOU WISHED YOU WERE DEAD OR WISHED YOU COULD GO TO SLEEP AND NOT WAKE UP?: NO
6. HAVE YOU EVER DONE ANYTHING, STARTED TO DO ANYTHING, OR PREPARED TO DO ANYTHING TO END YOUR LIFE?: NO
2. HAVE YOU ACTUALLY HAD ANY THOUGHTS OF KILLING YOURSELF?: NO

## 2023-11-13 NOTE — ANESTHESIA PREPROCEDURE EVALUATION
Patient: Martina Blackwood    Procedure Information       Date/Time: 11/13/23 0730    Procedure: Left Carpal Tunnel Release (Left: Wrist)    Location: [facility] [place] [facility] OR 02 / [facility] [place] [facility] OR    Surgeons: Torin Cole MD            Relevant Problems   Anesthesia (within normal limits)      Cardiovascular  > 4mets      Endocrine   (+) Class 2 obesity with body mass index (BMI) of 35.0 to 35.9 in adult   (+) Class 2 obesity with body mass index (BMI) of 37.0 to 37.9 in adult      GI   (+) Chronic diarrhea   (+) GERD (gastroesophageal reflux disease) (Well controlled)   (+) Irritable bowel syndrome with diarrhea      /Renal   (+) Fatty liver      Neuro/Psych  Headache for 2 months, being evaluated by neuro   (+) Anxiety   (+) Carpal tunnel syndrome   (+) Carpal tunnel syndrome, left   (+) Depression with anxiety   (+) Generalized anxiety disorder   (+) Moderate episode of recurrent major depressive disorder (CMS/HCC)   (+) PTSD (post-traumatic stress disorder)      Pulmonary (within normal limits)      GI/Hepatic   (+) Fatty liver      Musculoskeletal   (+) Carpal tunnel syndrome   (+) Carpal tunnel syndrome, left   (+) Scoliosis      Infectious Disease   (+) Antibiotic-induced yeast infection       Clinical information reviewed:   Tobacco  Allergies  Meds   Med Hx  Surg Hx  OB Status  Fam Hx  Soc   Hx        NPO Detail:  NPO/Void Status  Date of Last Liquid: 11/12/23  Time of Last Liquid: 2115  Date of Last Solid: 11/12/23  Time of Last Solid: 2115         Physical Exam    Airway  Mallampati: III  TM distance: >3 FB  Neck ROM: full     Cardiovascular    Dental   (+) upper dentures, lower dentures     Pulmonary    Abdominal        Anesthesia Plan    ASA 3     MAC     intravenous induction   Anesthetic plan and risks discussed with patient.    Plan discussed with CRNA and CAA.

## 2023-11-13 NOTE — DISCHARGE INSTRUCTIONS
Follow-Up Instructions    You will need to be seen in clinic in 10-15 days for a post-operative evaluation.  This appointment will be in the outpatient office, not at the Surgery Center.    You will need to call Cece in my office and schedule an appointment, unless there is a previous appointment that appears on your discharge instructions.  Her phone number is [phone].  Please do not delay in calling to make this appointment.      Activity Restrictions    1)  No driving for 24 hours after surgery, due to the anesthetic.    2)  No driving or operating heavy machinery while taking narcotic pain medication.    3)  Weight bearing as tolerated.  Light use of the fingers (writing, typing, texting) is good to do.     Discharge Medications    A prescription for a narcotic pain medication (Norco) has been sent to your pharmacy of record.  I do not expect you will need this, but wanted you to have it available as an option if over-the-counter medications are not adequately controlling your pain.  Most people simply take Tylenol, Motrin, Advil, or other anti-inflammatory for the pain.  If you do end up taking the prescription medication, please try to wean yourself off this as quickly as possible.    You can add the prescription medication to the anti-inflammatories if needed, but should not add it to Tylenol, as there is already Tylenol in the prescription.    Wound care instructions:     1)  Leave operative dressing in place for 7 days.  If you shower, cover the hand with a plastic bag and elevate it so the water cannot run down into the bag.    2)  After 7 days, remove the bandage and leave the incision open to air, or cover with a simple Band-Aid.   At that point, you may let water run freely over incision when showering.  Do not scrub.  Do not soak in pool or tub, or submerge the incision until you are fully 21 days from surgery.    3)  Call if any drainage after 7 days, increased redness/warmth/swelling at  incision site, abnormal pain/tenderness of the extremity, abnormal swelling of the extremity that does not respond to elevation, shortness of breath, or chest pain.    Tylenol given at 0730, may give next dose at 1:30 pm

## 2023-11-13 NOTE — ANESTHESIA POSTPROCEDURE EVALUATION
Patient: Martina Blackwood    Procedure Summary       Date: 11/13/23 Room / Location: [facility] OR 02 / [facility]    Anesthesia Start: 0730 Anesthesia Stop: 0808    Procedure: Left Carpal Tunnel Release (Left: Wrist) Diagnosis:       Carpal tunnel syndrome, left      (Carpal tunnel syndrome, left [G56.02])    Surgeons: Torin Cole MD Responsible Provider: Layo Rondon DO    Anesthesia Type: MAC ASA Status: 3            Anesthesia Type: MAC    Vitals Value Taken Time   /59 11/13/23 0809   Temp 36 11/13/23 0809   Pulse 79 11/13/23 0809   Resp 16 11/13/23 0809   SpO2 95 11/13/23 0809       Anesthesia Post Evaluation    Patient location during evaluation: PACU  Patient participation: complete - patient participated  Level of consciousness: awake and alert  Pain score: 2  Pain management: adequate  Multimodal analgesia pain management approach  Airway patency: patent  Two or more strategies used to mitigate risk of obstructive sleep apnea  Cardiovascular status: acceptable  Respiratory status: acceptable  Hydration status: acceptable      No notable events documented.

## 2023-11-13 NOTE — OP NOTE
ORTHOPEDIC OPERATIVE NOTE    Name:     Jennifer Blackwood  :     1980  Facility:    [facility]  Date of Surgery:   2023     PREOP DX:          Left Carpal Tunnel Syndrome    POSTOP DX:       Same    PROCEDURE:     Left Carpal Tunnel Release    SURGEON: JR Douglas MD    RESIDENT/FELLOW/ASSISTANT:  None    ANESTHESIA:    MAC Sedation + Local    ESTIMATED BLOOD LOSS :   2 ml    TOTAL FLUIDS:     300 ml LR    SPECIMEN:   None    TOURNIQUET TIME:  5 Minutes at 250 mmHg    FINDINGS: Tight, thick transverse carpal ligament    COMPLICATIONS:  None    PATIENT RETURNED TO/CONDITION:  PACU in Good      INDICATIONS:      Jennifer Blackwood is a 43 y.o.,  right-hand dominant female who presents with numbness and tingling in the median nerve distribution of the left hand that has failed to respond to conservative measures.  She is here for elective left carpal tunnel release.  I reminded her of surgical risks of infection; scarring; damage to nerves, tendons, or vessels; stiffness; wound healing problems; failure to relieve symptoms; recurrent symptoms; and pillar pain.  She wished to proceed.     NARRATIVE:    Following identification of the patient and confirmation of correct site of surgery and signed operative consent, she was brought to the operating room and a hand table affixed to the cart.  A light MAC sedation was administered by Anesthesia along with IV antibiotic dose.  A pneumatic tourniquet was placed high on the left arm, and the limb was prepped from fingertip to cuff with chlorhexidine, and draped free in the usual sterile fashion.  9 mL of a mix of 0.5% Marcaine and 1% lidocaine, plain, was instilled to the planned incision area. The limb was exsanguinated with an Esmarch, and the tourniquet inflated.    A standard 2 cm mini-open carpal tunnel incision was made and taken bluntly down to the palmar fascia, which was divided in line with its fibers.  Further careful blunt  dissection revealed the distal edge of the transverse carpal ligament.  The ligament was carefully, sharply, sequentially divided under direct vision as the contents of the carpal tunnel were carefully protected.  This division was done with scissors.  There was good refill of the longitudinal vessel.  The tourniquet was deflated, and pink color rapidly returned to all digits.  Meticulous hemostasis was achieved with bipolar.  After final irrigation, skin was closed with 4-0 vicryl subcutaneous and 4-0 Nylon skin stitch, and a soft dressing applied.  Patient was awakened and transferred to Recovery in stable condition.          Electronically signed  JOELLEN Cole MD  [phone]

## 2023-11-13 NOTE — H&P
H&P reviewed. The patient was examined and there are no changes to the H&P. Patient electing to proceed with surgery. Patient marked and consented.      Kale Marquez MD  PGY-2 Orthopedic Surgery  [facility]  [employer] Message Preferred  Pager: [phone]

## 2023-11-14 DIAGNOSIS — G56.02 CARPAL TUNNEL SYNDROME OF LEFT WRIST: Primary | ICD-10-CM

## 2023-11-14 DIAGNOSIS — K58.0 IRRITABLE BOWEL SYNDROME WITH DIARRHEA: ICD-10-CM

## 2023-11-14 RX ORDER — TRAMADOL HYDROCHLORIDE 50 MG/1
50 TABLET ORAL EVERY 6 HOURS PRN
Qty: 15 TABLET | Refills: 0 | Status: SHIPPED | OUTPATIENT
Start: 2023-11-14 | End: 2023-11-21

## 2023-11-20 ENCOUNTER — APPOINTMENT (OUTPATIENT)
Dept: PRIMARY CARE | Facility: CLINIC | Age: 43
End: 2023-11-20
Payer: COMMERCIAL

## 2023-11-22 ENCOUNTER — OFFICE VISIT (OUTPATIENT)
Dept: GASTROENTEROLOGY | Facility: CLINIC | Age: 43
End: 2023-11-22
Payer: COMMERCIAL

## 2023-11-22 VITALS
HEIGHT: 65 IN | SYSTOLIC BLOOD PRESSURE: 143 MMHG | HEART RATE: 83 BPM | WEIGHT: 196 LBS | BODY MASS INDEX: 32.65 KG/M2 | DIASTOLIC BLOOD PRESSURE: 84 MMHG

## 2023-11-22 DIAGNOSIS — R10.84 GENERALIZED ABDOMINAL PAIN: ICD-10-CM

## 2023-11-22 DIAGNOSIS — K76.0 FATTY LIVER: ICD-10-CM

## 2023-11-22 DIAGNOSIS — K52.9 CHRONIC DIARRHEA: ICD-10-CM

## 2023-11-22 DIAGNOSIS — K58.0 IRRITABLE BOWEL SYNDROME WITH DIARRHEA: Primary | ICD-10-CM

## 2023-11-22 DIAGNOSIS — R11.0 NAUSEA: ICD-10-CM

## 2023-11-22 DIAGNOSIS — R19.7 DIARRHEA, UNSPECIFIED TYPE: ICD-10-CM

## 2023-11-22 PROCEDURE — 99214 OFFICE O/P EST MOD 30 MIN: CPT | Performed by: REGISTERED NURSE

## 2023-11-22 PROCEDURE — 3008F BODY MASS INDEX DOCD: CPT | Performed by: REGISTERED NURSE

## 2023-11-22 RX ORDER — ONDANSETRON 4 MG/1
4 TABLET, FILM COATED ORAL EVERY 8 HOURS PRN
Qty: 90 TABLET | Refills: 0 | Status: SHIPPED | OUTPATIENT
Start: 2023-11-22 | End: 2023-12-22

## 2023-11-22 ASSESSMENT — ENCOUNTER SYMPTOMS
COUGH: 0
EYE PAIN: 0
APPETITE CHANGE: 0
UNEXPECTED WEIGHT CHANGE: 0
EYES NEGATIVE: 1
ARTHRALGIAS: 0
JOINT SWELLING: 0
DIFFICULTY URINATING: 0
SHORTNESS OF BREATH: 0
MYALGIAS: 0
NERVOUS/ANXIOUS: 0
ENDOCRINE NEGATIVE: 1

## 2023-11-22 NOTE — PROGRESS NOTES
REASON FOR VISIT: Emergency room follow-up visit.  Here for abdominal pain and diarrhea.    HPI:  Jennifer Blackwood is a 43 y.o. female with a past medical history of anxiety, PTSD, depression, GERD, irritable bowel with diarrhea, hypertension and chronic pelvic pain.  Status post cholecystectomy, hysterectomy, endometrial ablation.  Presents for follow up from  ER  (10/29/23) follow up for abdominal pain, nausea, vomiting and diarrhea x2 days.  Denies any recent travel, sick contacts or changes in her diet or medications.    Feels better now - no vomiting, less diarrhea, less cramping.  Normal bowel pattern  - 3-7 x a day.  Denies hematochezia or melena. Today - moved bowels 6x today, soft and watery.  Denies hematochezia, melena, or nocturnal bowel movements.     Endorses mild abdominal cramping.  Denies vomiting, reflux, dysphagia, early satiety or epigastric pain.  Her weight and appetite are stable.    Was given a prescription for Xifaxan recently which seems to help her symptoms.    Med list notable for -omeprazole 40 mg daily, amitriptyline 10 mg at bedtime, dicyclomine 20 mg, gabapentin, Norco, simethicone, Xifaxan    Labs notable for -normal lipase, magnesium, comprehensive metabolic panel panel, hemoglobin hematocrit.  White blood cell count slightly elevated-12.6    No fhx of CRC, Brother - Crohn's, mother, father, sister - IBS, no Celiac     Prev endoscopic eval:     >> Colonoscopy - 10/13/21- Dr Carter -polyps removed from the colon, terminal ileum was normal.  External hemorrhoids.  Repeat in 5 years for surveillance - Oct 2026.  Biopsy significant for lymphocytic colitis in Right colon, Left colon and rectum, adenomatous colon polyps.    >> EGD- 10/13/21 - Dr Carter -normal esophagus, 2 cm hiatal hernia, normal duodenum.  Duodenal biopsy-no significant pathologic findings.    REVIEW OF SYSTEMS    Review of Systems   Constitutional:  Negative for appetite change and unexpected weight change.   HENT:   Negative for mouth sores.    Eyes: Negative.  Negative for pain and visual disturbance.   Respiratory:  Negative for cough and shortness of breath.    Cardiovascular:  Negative for chest pain.   Endocrine: Negative.    Genitourinary:  Negative for difficulty urinating.   Musculoskeletal:  Negative for arthralgias, joint swelling and myalgias.   Skin:  Negative for rash.   Allergic/Immunologic: Negative for environmental allergies and food allergies.   Psychiatric/Behavioral:  The patient is not nervous/anxious.           Allergies   Allergen Reactions    Azithromycin Unknown    Penicillins Unknown, Diarrhea and Hives    Trospium Hives    Adhesive Tape-Silicones Rash    Aloe Vera Hives and Rash    Erythromycin Diarrhea, Hives, Other and Rash    Latex Hives, Rash and Swelling     Per pt latex tape       Past Medical History:   Diagnosis Date    Adjustment disorder 2000    Allergic 1995    Anxiety 1997    Chronic pain disorder 1/11/11    Depression 1997    Eczema 1991    GERD (gastroesophageal reflux disease) 1997    Headache     Liver disease 2022    Other abnormal auditory perceptions, unspecified ear 01/19/2018    Abnormal auditory perception    Other bursitis of knee, unspecified knee     Pes anserine bursitis    Panic attack     Panic disorder 1997    PTSD (post-traumatic stress disorder) 1/11/11    Sleep difficulties     Urinary tract infection 1997    Varicella 1986       Past Surgical History:   Procedure Laterality Date    CHOLECYSTECTOMY  2022    ENDOMETRIAL ABLATION  2009    HYSTERECTOMY  1/11/11    OTHER SURGICAL HISTORY  12/22/2022    Cystoscopy    OTHER SURGICAL HISTORY  12/22/2022    Vaginal lesion ablation    OTHER SURGICAL HISTORY  09/15/2022    Colonoscopy    OTHER SURGICAL HISTORY  09/15/2022    Esophagogastroduodenoscopy    OTHER SURGICAL HISTORY  02/16/2022    Cholecystectomy laparoscopic    OTHER SURGICAL HISTORY  06/14/2019    Tubal ligation bilateral    OTHER SURGICAL HISTORY  06/14/2019     Hysterectomy    TUBAL LIGATION      WISDOM TOOTH EXTRACTION         Family History   Problem Relation Name Age of Onset    Alcohol abuse Mother Pretty     Arthritis Mother Pretty     Asthma Mother Pretty     COPD Mother Pretty     Depression Mother Pretty     Hypertension Mother Pretty     Mental illness Mother Pretty     Miscarriages / Stillbirths Mother Pretty     Anxiety disorder Mother Pretty     Atrial fibrillation Mother Pretty     Alcohol abuse Father Chiid     COPD Father Chidi     Mental illness Father Chidi     Hearing loss Father Chidi     Celiac disease Father Chidi     Alcohol abuse Brother Vick     Depression Brother Vick     Diabetes Brother Vick     Drug abuse Brother Vick     Mental illness Brother Vick     Bipolar disorder Brother Vick     Other (cannabis use disorder) Brother Vick     Anxiety disorder Brother Vick     Paranoid behavior Brother Vick     Schizophrenia Brother Vick     Self-Injury Brother Vick     Suicide Attempts Brother Vick     Depression Daughter Kinadee     Genetic Testing Daughter Kinadee     Mental illness Daughter Kinadee     Blood Disorder Daughter Kinadee     Clotting disorder Daughter Kinadee     Depression Son Noel     Mental illness Son Noel     Depression Son Efren     Anesthesia related problems Son Efren     Breast cancer Maternal Grandmother Chantale HoldenChris     Cancer Maternal Grandmother Chantale Perez     Diabetes Maternal Grandmother Chantale Perez     Heart disease Maternal Grandmother Chantale Perez     Breast cancer Other      Lung cancer Other       labor Sister Oksana Blackwood        Social History     Tobacco Use    Smoking status: Every Day     Packs/day: 1.00     Years: 15.00     Additional pack years: 0.00     Total pack years: 15.00     Types: Cigarettes    Smokeless tobacco: Never   Substance Use Topics    Alcohol use: Not Currently     Comment: Used to be a social drinker       Current Outpatient Medications   Medication Sig Dispense  Refill    amitriptyline (Elavil) 10 mg tablet Take 2 tablets (20 mg) by mouth once daily at bedtime. 60 tablet 3    aspirin-acetaminophen-caffeine (Excedrin Migraine) 250-250-65 mg tablet Take 1 tablet by mouth every 6 hours if needed for headaches.      clobetasoL-calcipotriene 0.05-0.005 % solution APPLY AND GENTLY MASSAGE INTO AFFECTED AREA(S) ONCE DAILY      dicyclomine (Bentyl) 20 mg tablet Take 1 tablet (20 mg) by mouth 4 times a day as needed (cramping). 30 tablet 2    DULoxetine (Cymbalta) 60 mg DR capsule Take 1 capsule (60 mg) by mouth once daily. 30 capsule 1    ergocalciferol (Vitamin D-2) 1.25 MG (05108 UT) capsule Take 1 capsule (1,250 mcg) by mouth once a week.      gabapentin (Neurontin) 400 mg capsule Take 3 capsules (1,200 mg) by mouth once daily at bedtime. 90 capsule 5    GAS RELIEF, SIMETHICONE, ORAL       lamoTRIgine (LaMICtal) 100 mg tablet Take 1 tablet (100 mg) by mouth once daily. With 25 mg for a total dose of 125 mg 30 tablet 1    lamoTRIgine (LaMICtal) 25 mg tablet Take 1 tablet (25 mg) by mouth once daily. With 100 mg tablet for a total dose of 125mg 30 tablet 1    omeprazole (PriLOSEC) 40 mg DR capsule Take 1 capsule (40 mg) by mouth once daily in the morning. Take before meals. 1/2 hr prior to breakfast      ondansetron ODT (Zofran-ODT) 8 mg disintegrating tablet Take 1 tablet (8 mg) by mouth every 8 hours if needed for nausea or vomiting. 30 tablet 0    oxybutynin XL (Ditropan-XL) 15 mg 24 hr tablet       traZODone (Desyrel) 50 mg tablet Take 1-2 tablets ( mg) by mouth once daily at bedtime. 60 tablet 1    ALPRAZolam (Xanax) 0.25 mg tablet Take 1 tablet (0.25 mg) by mouth once daily as needed for anxiety for up to 20 days. 10 tablet 1    beclomethasone (QNASL) 80 mcg/actuation HFA aerosol inhaler Administer 2 sprays into each nostril once daily. (Patient not taking: Reported on 11/13/2023) 8.7 g 11    HYDROcodone-acetaminophen (Norco) 5-325 mg tablet Take 1 tablet by mouth  "every 6 hours if needed for severe pain (7 - 10). (Patient not taking: Reported on 11/22/2023) 14 tablet 0     No current facility-administered medications for this visit.       PHYSICAL EXAM:  /84   Pulse 83   Ht 1.651 m (5' 5\")   Wt 88.9 kg (196 lb)   BMI 32.62 kg/m²      Physical Exam  Constitutional:       Appearance: Normal appearance.   HENT:      Head: Normocephalic and atraumatic.      Nose: Nose normal.   Eyes:      Pupils: Pupils are equal, round, and reactive to light.   Cardiovascular:      Rate and Rhythm: Normal rate and regular rhythm.   Pulmonary:      Effort: Pulmonary effort is normal.      Breath sounds: Normal breath sounds.   Abdominal:      General: Abdomen is flat. Bowel sounds are normal. There is no distension.      Palpations: Abdomen is soft. There is no mass.      Tenderness: There is abdominal tenderness. There is no guarding or rebound.      Hernia: No hernia is present.   Genitourinary:     Rectum: Normal.   Musculoskeletal:         General: Normal range of motion.      Cervical back: Normal range of motion and neck supple.   Skin:     General: Skin is warm and dry.   Neurological:      Mental Status: She is alert and oriented to person, place, and time.   Psychiatric:         Mood and Affect: Mood normal.         Behavior: Behavior normal.          ASSESSMENT    Here for an emergency room follow-up visit from October 2023.  Two day history of abdominal pain, cramping, diarrhea nausea and vomiting.  You are feeling better.  History of irritable bowel syndrome and lymphocytic colitis.  I would like you to check some blood tests and stool studies.  You can follow-up in the GI clinic in 6 months to 1 year or as needed.    PLAN    Check blood tests for stool studies to rule out infection, malabsorption or inflammation  Use dicyclomine 20 mg before each meal and at bedtime as needed for abdominal cramping, urgency and diarrhea.  You can also take 1-2 Imodium 30 minutes before each " meal and at bedtime as needed for diarrhea  Fiber can help to regulate your bowels. It is good for diarrhea or constipation. Begin Fiber supplement, such as, Metamucil daily. Mix one teaspoon in 8 oz of water and drink each day for 7 days.  Week two -  mix two teaspoons in 8 oz of water and drink daily for 7 days. Week 3 - mix three teaspoons ( one tablespoon) in 8 oz of water and stay at that dose.  If you are bloated then decrease the dose.  Use fiber every day for 4 weeks.   Fiber can help regulate your bowels and is good for diarrhea or constipation.  Follow-up in GI clinic in 6 months to 1 year, sooner if needed  I have sent a refill to your pharmacy for ondansetron 4 mg (Zofran)   I have referred you to meet with the dietitian.  They will call you to schedule the appointment  You will be due for a colonoscopy in October 2026              Date: 11/22/2023  Time: 1:13 PM

## 2023-11-29 ENCOUNTER — APPOINTMENT (OUTPATIENT)
Dept: ORTHOPEDIC SURGERY | Facility: CLINIC | Age: 43
End: 2023-11-29
Payer: COMMERCIAL

## 2023-11-29 ENCOUNTER — APPOINTMENT (OUTPATIENT)
Dept: GASTROENTEROLOGY | Facility: CLINIC | Age: 43
End: 2023-11-29
Payer: COMMERCIAL

## 2023-11-30 ENCOUNTER — TELEMEDICINE (OUTPATIENT)
Dept: BEHAVIORAL HEALTH | Facility: CLINIC | Age: 43
End: 2023-11-30
Payer: COMMERCIAL

## 2023-11-30 DIAGNOSIS — F43.10 PTSD (POST-TRAUMATIC STRESS DISORDER): ICD-10-CM

## 2023-11-30 DIAGNOSIS — F33.1 MODERATE EPISODE OF RECURRENT MAJOR DEPRESSIVE DISORDER (MULTI): ICD-10-CM

## 2023-11-30 DIAGNOSIS — F41.1 GENERALIZED ANXIETY DISORDER: ICD-10-CM

## 2023-11-30 PROBLEM — C50.911 MALIGNANT NEOPLASM OF RIGHT FEMALE BREAST (MULTI): Status: RESOLVED | Noted: 2023-10-02 | Resolved: 2023-11-30

## 2023-11-30 PROCEDURE — 99214 OFFICE O/P EST MOD 30 MIN: CPT | Performed by: CLINICAL NURSE SPECIALIST

## 2023-11-30 RX ORDER — TRAZODONE HYDROCHLORIDE 50 MG/1
50-100 TABLET ORAL NIGHTLY
Qty: 60 TABLET | Refills: 1 | Status: SHIPPED | OUTPATIENT
Start: 2023-11-30 | End: 2024-01-18 | Stop reason: SDUPTHER

## 2023-11-30 RX ORDER — LAMOTRIGINE 100 MG/1
100 TABLET ORAL DAILY
Qty: 30 TABLET | Refills: 1 | Status: SHIPPED | OUTPATIENT
Start: 2023-11-30 | End: 2024-01-18 | Stop reason: SDUPTHER

## 2023-11-30 RX ORDER — DESVENLAFAXINE 50 MG/1
50 TABLET, EXTENDED RELEASE ORAL DAILY
Qty: 30 TABLET | Refills: 1 | Status: SHIPPED | OUTPATIENT
Start: 2024-01-11 | End: 2023-12-04

## 2023-11-30 RX ORDER — DULOXETIN HYDROCHLORIDE 30 MG/1
30 CAPSULE, DELAYED RELEASE ORAL DAILY
Qty: 14 CAPSULE | Refills: 0 | Status: SHIPPED | OUTPATIENT
Start: 2023-11-30 | End: 2024-01-18

## 2023-11-30 RX ORDER — LAMOTRIGINE 25 MG/1
25 TABLET ORAL DAILY
Qty: 30 TABLET | Refills: 1 | Status: SHIPPED | OUTPATIENT
Start: 2023-11-30 | End: 2024-01-18 | Stop reason: SDUPTHER

## 2023-11-30 RX ORDER — ALPRAZOLAM 0.5 MG/1
0.5 TABLET ORAL DAILY PRN
Qty: 10 TABLET | Refills: 1 | Status: SHIPPED | OUTPATIENT
Start: 2023-11-30 | End: 2024-01-18 | Stop reason: SDUPTHER

## 2023-11-30 RX ORDER — DESVENLAFAXINE SUCCINATE 25 MG/1
TABLET, EXTENDED RELEASE ORAL
Qty: 74 TABLET | Refills: 0 | Status: SHIPPED | OUTPATIENT
Start: 2023-11-30 | End: 2023-12-04

## 2023-11-30 NOTE — ASSESSMENT & PLAN NOTE
Reviewed KS12 test results- will have copy scanned into medical record, and provided copy via email.   Discussed trialing alternatives to duloxetine, given limited benefit (supported by [facility] results). Receptive to trialing desvenlafaxine. Reviewed r/b/a and cross-titration instructions.   Decrease duloxetine to 30 mg PO daily x 2 weeks, then discontinue.   Start desvenlafaxine ER 25 mg PO daily x 2 weeks, then increase to 50 mg PO daily.   Will increase dose of alprazolam PRN from 0.25--> 0.5 mg PO PRN for severe anxiety/panic (short supply- 10 tabs with 1 refill). Reasonable to continue while cross-titrating antidepressant medication.   Continue engagement in individual therapy.     Reviewed OARRS, no discrepancies or concerns. Discussed risk of motor/cognitive impairment, sedation, dependence, tolerance, abuse, withdrawal sequelae, accidental overdose, life-threatening respiratory depression (bhavesh. in combination with alcohol and/or opioids), excess sedation in combination with other sedating medicines.

## 2023-11-30 NOTE — ASSESSMENT & PLAN NOTE
Cross-titration of duloxetine in favor of desvenlafaxine as noted above. Continue alprazolam PRN and individual therapy.

## 2023-11-30 NOTE — PROGRESS NOTES
"Outpatient Psychiatry      Subjective   Jennifer Blackwood \"Martina\", is a 43 y.o. female,  seen in follow-up.      Assessment/Plan   Problem List Items Addressed This Visit             ICD-10-CM    Generalized anxiety disorder F41.1     Reviewed [employer] test results- will have copy scanned into medical record, and provided copy via email.   Discussed trialing alternatives to duloxetine, given limited benefit (supported by [employer] results). Receptive to trialing desvenlafaxine. Reviewed r/b/a and cross-titration instructions.   Decrease duloxetine to 30 mg PO daily x 2 weeks, then discontinue.   Start desvenlafaxine ER 25 mg PO daily x 2 weeks, then increase to 50 mg PO daily.   Will increase dose of alprazolam PRN from 0.25--> 0.5 mg PO PRN for severe anxiety/panic (short supply- 10 tabs with 1 refill). Reasonable to continue while cross-titrating antidepressant medication.   Continue engagement in individual therapy.     Reviewed OARRS, no discrepancies or concerns. Discussed risk of motor/cognitive impairment, sedation, dependence, tolerance, abuse, withdrawal sequelae, accidental overdose, life-threatening respiratory depression (bhavesh. in combination with alcohol and/or opioids), excess sedation in combination with other sedating medicines.           Relevant Medications    DULoxetine (Cymbalta) 30 mg DR capsule    desvenlafaxine succinate (Pristiq) 25 mg 24 hour tablet    desvenlafaxine 50 mg 24 hr tablet (Start on 1/11/2024)    traZODone (Desyrel) 50 mg tablet    ALPRAZolam (Xanax) 0.5 mg tablet    Other Relevant Orders    Follow Up In Psychiatry    PTSD (post-traumatic stress disorder) F43.10     Cross-titration of duloxetine in favor of desvenlafaxine as noted above. Continue alprazolam PRN and individual therapy.          Relevant Medications    DULoxetine (Cymbalta) 30 mg DR capsule    desvenlafaxine succinate (Pristiq) 25 mg 24 hour tablet    desvenlafaxine 50 mg 24 hr tablet (Start on 1/11/2024)    " lamoTRIgine (LaMICtal) 100 mg tablet    lamoTRIgine (LaMICtal) 25 mg tablet    traZODone (Desyrel) 50 mg tablet    ALPRAZolam (Xanax) 0.5 mg tablet    Other Relevant Orders    Follow Up In Psychiatry    Moderate episode of recurrent major depressive disorder (CMS/HCC) F33.1     Cross-titration of duloxetine in favor of desvenlafaxine as noted above. Continue individual therapy.   Continue lamotrigine 125 mg PO daily- refill sent to pharmacy today.     Advised of potentially fatal rash (i.e. Mcdowell-Luis Syndrome) in 1:10,000 individuals. Agrees to stop medication and seek medical attention immediately if rash or blistering of the mucosal surfaces develops; also discussed risk for benign drug-induced rash, and advised to cease medication and seek immediate medical attention to r/o SJS given its potential lethality. Further advised that the risk for this reaction increases if proper dose titration is not followed, and that after 2 days of non-adherence, re-titration is necessary.          Relevant Medications    DULoxetine (Cymbalta) 30 mg DR capsule    desvenlafaxine succinate (Pristiq) 25 mg 24 hour tablet    desvenlafaxine 50 mg 24 hr tablet (Start on 1/11/2024)    lamoTRIgine (LaMICtal) 100 mg tablet    lamoTRIgine (LaMICtal) 25 mg tablet    traZODone (Desyrel) 50 mg tablet    Other Relevant Orders    Follow Up In Psychiatry       Follow-up in 4-6 weeks     Risk Assessment:  Risk Assessment: Jennifer Blackwood is currently a low acute risk of suicide and self-harm due to no past suicide attempt(s) and not currently endorsing thoughts of suicide. Jennifer Blackwood is currently a low acute risk of violence and harm to others due to no past history of violence and not currently threatening others.  Suicidal Risk Factors: , history of trauma/abuse, chronic medical illness, current psychiatric illness, and panic attacks  Violence Risk Factors: none  Protective Factors: strong coping skills, sense of  "responsibility towards family, social support/connectedness, child related concerns/living with child <18 years, positive family relationships, hopefulness/future orientation, and marriage/partnership    Provided with crisis/emergency resources, including the [facility] Crisis Hotline 1-782.918.6632, Crisis Text Line (text 2TPND to 199941) and the National Suicide Prevention Lifeline hotline 1-359.832.9849. Agrees to call 988 or go to the nearest emergency department if she feels unsafe, or has suicidal thinking with a plan or intent.       Reason for Visit:  Follow-up for medication management.     HPI:  Since her last visit,     Feeling just blah  A lot going on- kids' mental health sx, mom still having a hard time adjusting to nursing facility, trying to get everything moved out of mom's apartment, recently had surgery on her wrist so limited in what she's able to do.   Anxiety has still been significantly high. \"I've had the depression off and on for a long time. Sometimes it's worse, and sometimes it gets better... I feel like that is easier to handle right now.\"   Not managing anxiety well- \"that's the worst part. I feel like there's nothing I'm doing that's working.\"  Has occasionally taken alprazolam PRN (has taken ~8 tabs in the past month). Feels like in the past when she was taking the 0.5 mg dose it was more consistently effective for severe sx. Current dose is not always helpful.     Reviewed [employer] report and discussed medication options.     Denies suicidal ideation or a passive death wish.     No new health problems. Taking metamucil OTC daily. Amitriptyline dose was doubled by neurology. Had short rx for Percocet and tramadol PRN for pain following recent carpal tunnel surgery.       Current Psychiatric Medications:  Duloxetine 60 mg PO BID  Continue lamotrigine 125 mg PO daily  Continue alprazolam 0.25 mg PO PRN for severe anxiety      Record Review: brief     Medical Review Of " "Systems:  Pertinent items are noted in HPI.    Psychiatric Review Of Systems:  As noted in HPI.        Objective   Mental Status Exam  General Appearance: Well groomed, appropriate eye contact  Attitude/Behavior: Cooperative  Motor: No psychomotor agitation or retardation, no tremor or other abnormal movements  Speech: Normal rate, volume, prosody  Gait/Station: Other:(comment) (not observed- virtual visit)  Mood: \"Just kind of blah.\"  Affect: Constricted, Congruent with mood and topic of conversation  Thought Process: Linear, goal directed  Thought Associations: No loosening of associations  Thought Content: Normal, Other: (comment) (anxious themes)  Perception: No perceptual abnormalities noted  Sensorium: Alert and oriented to person, place, time and situation  Insight: Intact  Judgement: Intact  Cognition: Cognitively intact to conversational testing with respect to attention, orientation, fund of knowledge, recent and remote memory, and language    Vitals:  There were no vitals filed for this visit.    Labs:  not applicable      Allyn Acosta, APRN-CNP, APRN-CNS  "

## 2023-11-30 NOTE — ASSESSMENT & PLAN NOTE
Cross-titration of duloxetine in favor of desvenlafaxine as noted above. Continue individual therapy.   Continue lamotrigine 125 mg PO daily- refill sent to pharmacy today.     Advised of potentially fatal rash (i.e. Mcdowell-Luis Syndrome) in 1:10,000 individuals. Agrees to stop medication and seek medical attention immediately if rash or blistering of the mucosal surfaces develops; also discussed risk for benign drug-induced rash, and advised to cease medication and seek immediate medical attention to r/o SJS given its potential lethality. Further advised that the risk for this reaction increases if proper dose titration is not followed, and that after 2 days of non-adherence, re-titration is necessary.

## 2023-12-01 ENCOUNTER — OFFICE VISIT (OUTPATIENT)
Dept: ORTHOPEDIC SURGERY | Facility: CLINIC | Age: 43
End: 2023-12-01
Payer: COMMERCIAL

## 2023-12-01 ENCOUNTER — PATIENT MESSAGE (OUTPATIENT)
Dept: BEHAVIORAL HEALTH | Facility: CLINIC | Age: 43
End: 2023-12-01

## 2023-12-01 DIAGNOSIS — G56.02 CARPAL TUNNEL SYNDROME OF LEFT WRIST: Primary | ICD-10-CM

## 2023-12-01 PROCEDURE — 99024 POSTOP FOLLOW-UP VISIT: CPT | Performed by: ORTHOPAEDIC SURGERY

## 2023-12-01 PROCEDURE — 3008F BODY MASS INDEX DOCD: CPT | Performed by: ORTHOPAEDIC SURGERY

## 2023-12-01 RX ORDER — TRAMADOL HYDROCHLORIDE 50 MG/1
50 TABLET ORAL EVERY 6 HOURS PRN
Qty: 15 TABLET | Refills: 0 | Status: SHIPPED | OUTPATIENT
Start: 2023-12-01 | End: 2023-12-11 | Stop reason: ALTCHOICE

## 2023-12-01 RX ORDER — CEPHALEXIN 250 MG/1
250 CAPSULE ORAL 4 TIMES DAILY
Qty: 40 CAPSULE | Refills: 0 | Status: SHIPPED | OUTPATIENT
Start: 2023-12-01 | End: 2023-12-11 | Stop reason: ALTCHOICE

## 2023-12-01 NOTE — PROGRESS NOTES
CHIEF COMPLAINT         Left hand check.    ASSESSMENT + PLAN    Postop day 18 from left carpal tunnel release    There is a little more redness than I would like to see at this point.  We discussed the options and agreed on leaving the sutures in for 1 more week.  Follow-up at that point for wound check and nylon suture removal.  A prescription for Keflex 250 mg was transmitted to your pharmacy.  Take this 4 times a day for the next 10 days.  Contact my office if you notice any worsening of the infection signs that we reviewed.  Advance activity as pain and stiffness allow.    Contact my office with any interval concerns.        HISTORY OF PRESENT ILLNESS       Patient returns today, postop day 18 from left carpal tunnel release, closed with nylon at patient request.  Patient reports the wound has healed together but she has been having a little spotting on the bandage.  This sounds like serous fluid from her description.  The tingling is much improved but she is having a lot of discomfort in the area of the incision.  No fevers, chills, or other constitutional signs.      PHYSICAL EXAM       Incision is clean, dry, intact today with nylon suture in place.  No gapping.  No fluctuance.  No expressible fluid.  Normal amount of erythema.  She is significantly more tender than I would expect for an incision of this age.  She demonstrates full composite finger flexion extension with good intact intrinsic plus minus posture.  Intact thenar motor function.  Appropriate wrist and forearm motion.  Sensation intact to light touch in all distributions.  Capillary refill less than 2 seconds.      IMAGING / LABS / EMGs    None today      Electronically Signed      JOELLEN Cole MD      Orthopaedic Hand Surgery      [phone]

## 2023-12-01 NOTE — LETTER
December 3, 2023     Jean Roman DO  [address]  [place]  [facility] [address]    Patient: Martina Blackwood   YOB: 1980   Date of Visit: 12/1/2023       Dear Dr. Jean Roman DO:    Thank you for referring Martina Blackwood to me for evaluation. Below are my notes for this consultation.  If you have questions, please do not hesitate to call me. I look forward to following your patient along with you.       Sincerely,     Torin Cole MD      CC: No Recipients  ______________________________________________________________________________________    CHIEF COMPLAINT         Left hand check.    ASSESSMENT + PLAN    Postop day 18 from left carpal tunnel release    There is a little more redness than I would like to see at this point.  We discussed the options and agreed on leaving the sutures in for 1 more week.  Follow-up at that point for wound check and nylon suture removal.  A prescription for Keflex 250 mg was transmitted to your pharmacy.  Take this 4 times a day for the next 10 days.  Contact my office if you notice any worsening of the infection signs that we reviewed.  Advance activity as pain and stiffness allow.    Contact my office with any interval concerns.        HISTORY OF PRESENT ILLNESS       Patient returns today, postop day 18 from left carpal tunnel release, closed with nylon at patient request.  Patient reports the wound has healed together but she has been having a little spotting on the bandage.  This sounds like serous fluid from her description.  The tingling is much improved but she is having a lot of discomfort in the area of the incision.  No fevers, chills, or other constitutional signs.      PHYSICAL EXAM       Incision is clean, dry, intact today with nylon suture in place.  No gapping.  No fluctuance.  No expressible fluid.  Normal amount of erythema.  She is significantly more tender than I would expect for an incision of this age.  She demonstrates full composite  finger flexion extension with good intact intrinsic plus minus posture.  Intact thenar motor function.  Appropriate wrist and forearm motion.  Sensation intact to light touch in all distributions.  Capillary refill less than 2 seconds.      IMAGING / LABS / EMGs    None today      Electronically Signed      JOELLEN Cole MD      Orthopaedic Hand Surgery      [phone]

## 2023-12-04 DIAGNOSIS — F41.1 GENERALIZED ANXIETY DISORDER: ICD-10-CM

## 2023-12-04 DIAGNOSIS — F43.10 PTSD (POST-TRAUMATIC STRESS DISORDER): ICD-10-CM

## 2023-12-04 DIAGNOSIS — F33.1 MODERATE EPISODE OF RECURRENT MAJOR DEPRESSIVE DISORDER (MULTI): ICD-10-CM

## 2023-12-04 RX ORDER — DESVENLAFAXINE 50 MG/1
50 TABLET, EXTENDED RELEASE ORAL DAILY
Qty: 30 TABLET | Refills: 1 | Status: SHIPPED | OUTPATIENT
Start: 2023-12-18 | End: 2024-01-18 | Stop reason: SDUPTHER

## 2023-12-04 RX ORDER — DESVENLAFAXINE SUCCINATE 25 MG/1
25 TABLET, EXTENDED RELEASE ORAL DAILY
Qty: 14 TABLET | Refills: 0 | Status: SHIPPED | OUTPATIENT
Start: 2023-12-04 | End: 2024-01-18 | Stop reason: ALTCHOICE

## 2023-12-06 ENCOUNTER — APPOINTMENT (OUTPATIENT)
Dept: ORTHOPEDIC SURGERY | Facility: CLINIC | Age: 43
End: 2023-12-06
Payer: COMMERCIAL

## 2023-12-08 ENCOUNTER — OFFICE VISIT (OUTPATIENT)
Dept: ORTHOPEDIC SURGERY | Facility: CLINIC | Age: 43
End: 2023-12-08
Payer: COMMERCIAL

## 2023-12-08 DIAGNOSIS — G56.02 CARPAL TUNNEL SYNDROME, LEFT: Primary | ICD-10-CM

## 2023-12-08 PROCEDURE — 99024 POSTOP FOLLOW-UP VISIT: CPT | Performed by: ORTHOPAEDIC SURGERY

## 2023-12-08 PROCEDURE — 3008F BODY MASS INDEX DOCD: CPT | Performed by: ORTHOPAEDIC SURGERY

## 2023-12-08 NOTE — LETTER
December 9, 2023     Jean Roman DO  [address]  [place]  [facility] [address]    Patient: Martina Blackwood   YOB: 1980   Date of Visit: 12/8/2023       Dear Dr. Jean Roman DO:    Thank you for referring Martina Blackwood to me for evaluation. Below are my notes for this consultation.  If you have questions, please do not hesitate to call me. I look forward to following your patient along with you.       Sincerely,     Torin Cole MD      CC: No Recipients  ______________________________________________________________________________________    CHIEF COMPLAINT         Left hand f/u    ASSESSMENT + PLAN    Postop day 25 from left carpal tunnel release    The wound appears to be healing normally.  The nylon sutures were removed without difficulty today.  You may advance activity with no particular restrictions at this point.    Follow-up with any additional concerns.      HISTORY OF PRESENT ILLNESS       Patient returns today, 1 week after last visit and now postop day 25 from left carpal tunnel release.  She completed the antibiotic prescription.  There is been no further drainage.  The redness is gone and the tenderness is decreasing appropriately.  She is here for scheduled nylon suture removal.  The tingling in the fingers is gone.  No new concerns.      PHYSICAL EXAM       Incision is clean, dry, intact and well-healed.  There is a little overgrowth of the superficial skin.  The 4 nylon sutures remain in place.  Full composite finger flexion extension.  Full intrinsic plus minus posture.  Thumb opposition to station 9.  Symmetric wrist and forearm motion.  No erythema, fluctuance, expressible fluid, increase skin temperature, or other signs concerning for ongoing infection.  The sutures were removed without difficulty.  The wound appears well-healed.  Sensation intact to light touch in all distributions.  Capillary refill less than 2 seconds.  2+ radial and ulnar pulses.      IMAGING /  LABS / EMGs    None today      Electronically Signed      JOELLEN Cole MD      Orthopaedic Hand Surgery      [phone]

## 2023-12-08 NOTE — PROGRESS NOTES
CHIEF COMPLAINT         Left hand f/u    ASSESSMENT + PLAN    Postop day 25 from left carpal tunnel release    The wound appears to be healing normally.  The nylon sutures were removed without difficulty today.  You may advance activity with no particular restrictions at this point.    Follow-up with any additional concerns.      HISTORY OF PRESENT ILLNESS       Patient returns today, 1 week after last visit and now postop day 25 from left carpal tunnel release.  She completed the antibiotic prescription.  There is been no further drainage.  The redness is gone and the tenderness is decreasing appropriately.  She is here for scheduled nylon suture removal.  The tingling in the fingers is gone.  No new concerns.      PHYSICAL EXAM       Incision is clean, dry, intact and well-healed.  There is a little overgrowth of the superficial skin.  The 4 nylon sutures remain in place.  Full composite finger flexion extension.  Full intrinsic plus minus posture.  Thumb opposition to station 9.  Symmetric wrist and forearm motion.  No erythema, fluctuance, expressible fluid, increase skin temperature, or other signs concerning for ongoing infection.  The sutures were removed without difficulty.  The wound appears well-healed.  Sensation intact to light touch in all distributions.  Capillary refill less than 2 seconds.  2+ radial and ulnar pulses.      IMAGING / LABS / EMGs    None today      Electronically Signed      JOELLEN Cole MD      Orthopaedic Hand Surgery      [phone]

## 2023-12-09 ENCOUNTER — HOSPITAL ENCOUNTER (EMERGENCY)
Facility: HOSPITAL | Age: 43
Discharge: HOME | End: 2023-12-09
Payer: COMMERCIAL

## 2023-12-09 VITALS
OXYGEN SATURATION: 96 % | TEMPERATURE: 97.7 F | DIASTOLIC BLOOD PRESSURE: 57 MMHG | BODY MASS INDEX: 31.65 KG/M2 | HEIGHT: 65 IN | RESPIRATION RATE: 18 BRPM | HEART RATE: 95 BPM | WEIGHT: 190 LBS | SYSTOLIC BLOOD PRESSURE: 105 MMHG

## 2023-12-09 DIAGNOSIS — T81.49XA SURGICAL WOUND INFECTION: Primary | ICD-10-CM

## 2023-12-09 PROCEDURE — 99283 EMERGENCY DEPT VISIT LOW MDM: CPT

## 2023-12-09 PROCEDURE — 87075 CULTR BACTERIA EXCEPT BLOOD: CPT | Mod: ELYLAB | Performed by: NURSE PRACTITIONER

## 2023-12-09 RX ORDER — MUPIROCIN 20 MG/G
1 OINTMENT TOPICAL
Qty: 22 G | Refills: 0 | Status: SHIPPED | OUTPATIENT
Start: 2023-12-09 | End: 2024-05-08

## 2023-12-09 RX ORDER — SULFAMETHOXAZOLE AND TRIMETHOPRIM 800; 160 MG/1; MG/1
1 TABLET ORAL 2 TIMES DAILY
Qty: 14 TABLET | Refills: 0 | Status: SHIPPED | OUTPATIENT
Start: 2023-12-09 | End: 2023-12-16

## 2023-12-09 RX ORDER — FLUCONAZOLE 150 MG/1
150 TABLET ORAL ONCE
Qty: 1 TABLET | Refills: 0 | Status: SHIPPED | OUTPATIENT
Start: 2023-12-09 | End: 2023-12-11 | Stop reason: ALTCHOICE

## 2023-12-09 ASSESSMENT — PAIN SCALES - GENERAL: PAINLEVEL_OUTOF10: 6

## 2023-12-09 ASSESSMENT — PAIN - FUNCTIONAL ASSESSMENT: PAIN_FUNCTIONAL_ASSESSMENT: 0-10

## 2023-12-09 NOTE — ED PROVIDER NOTES
HPI   Chief Complaint   Patient presents with    Wound Check     Pt has sutures  removed yesterday and now wound has reopened        43-year-old female presents emergency department, states she had carpal tunnel surgery performed end of last month, states during her checkup, 12/1, she was post to have her stitches taken out but they delayed taking them out as they did not quite look healed.  Had the sutures out a couple days ago, states this morning the wound itself opened up.  Her surgeon recommended she present to the ER to have it evaluated.  Patient states it has been more painful and draining.  She did finish course of Keflex in the last few days      History provided by:  Patient   used: No                        No data recorded                Patient History   Past Medical History:   Diagnosis Date    Adjustment disorder 2000    Allergic 1995    Anxiety 1997    Chronic pain disorder 1/11/11    Depression 1997    Eczema 1991    GERD (gastroesophageal reflux disease) 1997    Headache     Liver disease 2022    Other abnormal auditory perceptions, unspecified ear 01/19/2018    Abnormal auditory perception    Other bursitis of knee, unspecified knee     Pes anserine bursitis    Panic attack     Panic disorder 1997    PTSD (post-traumatic stress disorder) 1/11/11    Sleep difficulties     Urinary tract infection 1997    Varicella 1986     Past Surgical History:   Procedure Laterality Date    CHOLECYSTECTOMY  2022    ENDOMETRIAL ABLATION  2009    HYSTERECTOMY  1/11/11    OTHER SURGICAL HISTORY  12/22/2022    Cystoscopy    OTHER SURGICAL HISTORY  12/22/2022    Vaginal lesion ablation    OTHER SURGICAL HISTORY  09/15/2022    Colonoscopy    OTHER SURGICAL HISTORY  09/15/2022    Esophagogastroduodenoscopy    OTHER SURGICAL HISTORY  02/16/2022    Cholecystectomy laparoscopic    OTHER SURGICAL HISTORY  06/14/2019    Tubal ligation bilateral    OTHER SURGICAL HISTORY  06/14/2019    Hysterectomy     TUBAL LIGATION      WISDOM TOOTH EXTRACTION       Family History   Problem Relation Name Age of Onset    Alcohol abuse Mother Pretty     Arthritis Mother Pretty     Asthma Mother Pretty     COPD Mother Pretty     Depression Mother Pretty     Hypertension Mother Pretty     Mental illness Mother Pretty     Miscarriages / Stillbirths Mother Pretty     Anxiety disorder Mother Pretty     Atrial fibrillation Mother Pretty     Alcohol abuse Father Chidi     COPD Father Chidi     Mental illness Father Chidi     Hearing loss Father Chidi     Celiac disease Father Chidi     Alcohol abuse Brother Vick     Depression Brother Vick     Diabetes Brother Vick     Drug abuse Brother Vick     Mental illness Brother Vick     Bipolar disorder Brother Vick     Other (cannabis use disorder) Brother Vick     Anxiety disorder Brother Vick     Paranoid behavior Brother Vick     Schizophrenia Brother Vick     Self-Injury Brother Vick     Suicide Attempts Brother Vick     Depression Daughter Kinadee     Genetic Testing Daughter Kinadee     Mental illness Daughter Kinadee     Blood Disorder Daughter Kinadee     Clotting disorder Daughter Kinadee     Depression Son Noel     Mental illness Son Noel     Depression Son Efren     Anesthesia related problems Son Efren     Breast cancer Maternal Grandmother Chantale Perez     Cancer Maternal Grandmother Chantale Perez     Diabetes Maternal Grandmother Chantale Perez     Heart disease Maternal Grandmother Chantale Perez     Breast cancer Other      Lung cancer Other       labor Sister Oksana Blackwood      Social History     Tobacco Use    Smoking status: Every Day     Packs/day: 1.00     Years: 15.00     Additional pack years: 0.00     Total pack years: 15.00     Types: Cigarettes    Smokeless tobacco: Never   Vaping Use    Vaping Use: Never used   Substance Use Topics    Alcohol use: Not Currently     Comment: Used to be a social drinker    Drug use: Never       Physical Exam   ED  Triage Vitals [12/09/23 0901]   Temp Heart Rate Resp BP   36.5 °C (97.7 °F) 95 18 105/57      SpO2 Temp src Heart Rate Source Patient Position   96 % -- -- --      BP Location FiO2 (%)     -- --       Physical Exam  General: Vitals noted, no distress. Afebrile.   Pulmonary: Lungs clear bilaterally with good aeration. No adventitious breath sounds.   Extremities: No peripheral edema. Exam of the hand shows proximal aspect of the hand, palmar there is a an incision, it is slightly open and gaping, tender on palpation, there is some drainage and mild cellulitic changes as well.. Is neurovascularly intact distally. Specifically, has full strength with flexion and extension of the digits. Is nontender over the wrist. Remainder the extremity is nontender.   Skin: No rash.       ED Course & MDM   Diagnoses as of 12/09/23 0939   Surgical wound infection       Medical Decision Making  Wound does look infected which is likely the reason for the mild dehiscence.  Discussed leaving the wound as it is, culture was obtained, and the patient will be started back on antibiotics.  Discussed frequent cleansing, keeping the area covered.  Patient does have a mild allergy to adhesive so discussed using 4 x 4's and an Ace wrap instead of adhesive at all times.  Patient should follow-up closely with her surgeon for reevaluation, continue to closely monitor and return with any worsening symptoms or any additional concerns.    Procedure  Procedures     PABLO Soto-NASREEN  12/09/23 0982

## 2023-12-11 ENCOUNTER — PHARMACY VISIT (OUTPATIENT)
Dept: PHARMACY | Facility: CLINIC | Age: 43
End: 2023-12-11

## 2023-12-11 ENCOUNTER — OFFICE VISIT (OUTPATIENT)
Dept: PRIMARY CARE | Facility: CLINIC | Age: 43
End: 2023-12-11
Payer: COMMERCIAL

## 2023-12-11 VITALS
RESPIRATION RATE: 20 BRPM | HEART RATE: 88 BPM | SYSTOLIC BLOOD PRESSURE: 100 MMHG | BODY MASS INDEX: 32.45 KG/M2 | DIASTOLIC BLOOD PRESSURE: 67 MMHG | WEIGHT: 195 LBS | OXYGEN SATURATION: 96 % | TEMPERATURE: 98.1 F

## 2023-12-11 DIAGNOSIS — U07.1 COVID-19: ICD-10-CM

## 2023-12-11 DIAGNOSIS — J06.9 UPPER RESPIRATORY TRACT INFECTION, UNSPECIFIED TYPE: Primary | ICD-10-CM

## 2023-12-11 LAB
BACTERIA SPEC CULT: ABNORMAL
GRAM STN SPEC: ABNORMAL
GRAM STN SPEC: ABNORMAL
POC SARS-COV-2 AG BINAX: ABNORMAL

## 2023-12-11 PROCEDURE — 3008F BODY MASS INDEX DOCD: CPT | Performed by: NURSE PRACTITIONER

## 2023-12-11 PROCEDURE — 87811 SARS-COV-2 COVID19 W/OPTIC: CPT | Performed by: NURSE PRACTITIONER

## 2023-12-11 PROCEDURE — 99214 OFFICE O/P EST MOD 30 MIN: CPT | Performed by: NURSE PRACTITIONER

## 2023-12-11 PROCEDURE — RXMED WILLOW AMBULATORY MEDICATION CHARGE

## 2023-12-11 RX ORDER — BENZONATATE 100 MG/1
100 CAPSULE ORAL 3 TIMES DAILY PRN
Qty: 42 CAPSULE | Refills: 0 | Status: SHIPPED | OUTPATIENT
Start: 2023-12-11 | End: 2023-12-11 | Stop reason: SDUPTHER

## 2023-12-11 RX ORDER — BENZONATATE 100 MG/1
100 CAPSULE ORAL 3 TIMES DAILY PRN
Qty: 21 CAPSULE | Refills: 0 | Status: SHIPPED | OUTPATIENT
Start: 2023-12-11 | End: 2023-12-18

## 2023-12-11 ASSESSMENT — ENCOUNTER SYMPTOMS
ABDOMINAL PAIN: 0
CHILLS: 0
APPETITE CHANGE: 0
MYALGIAS: 0
SORE THROAT: 1
HEADACHES: 1
FEVER: 0
NAUSEA: 0
FATIGUE: 0
VOMITING: 0
DIARRHEA: 0
COUGH: 1
SHORTNESS OF BREATH: 0
SINUS PAIN: 1
SINUS PRESSURE: 1
WHEEZING: 1
RHINORRHEA: 1

## 2023-12-11 NOTE — PROGRESS NOTES
Subjective   Patient ID: Martina Blackwood is a 43 y.o. female who presents for URI (Nasal congestion and cough for 2 days. ).    Patient's symptoms started on Saturday with nasal congestion and cough. Patient took cold and cough medication HBP and it does not help. Patient is vaccinated against COVID with boosters, not the most recent. No chest pain but she has chest congestion.     Patient is currently on bactrim for a hand infection   Review of Systems   Constitutional:  Negative for appetite change, chills, fatigue and fever.   HENT:  Positive for congestion, postnasal drip, rhinorrhea, sinus pressure, sinus pain and sore throat.    Respiratory:  Positive for cough and wheezing. Negative for shortness of breath.    Cardiovascular:  Negative for chest pain.   Gastrointestinal:  Negative for abdominal pain, diarrhea, nausea and vomiting.   Musculoskeletal:  Negative for myalgias.   Neurological:  Positive for headaches.       Objective   /67   Pulse 88   Temp 36.7 °C (98.1 °F)   Resp 20   Wt 88.5 kg (195 lb)   SpO2 96%   BMI 32.45 kg/m²     Physical Exam  Vitals reviewed.   Constitutional:       General: She is not in acute distress.     Appearance: Normal appearance. She is ill-appearing. She is not toxic-appearing.   HENT:      Head: Atraumatic.      Right Ear: Tympanic membrane, ear canal and external ear normal.      Left Ear: Tympanic membrane, ear canal and external ear normal.      Nose: Congestion present. No rhinorrhea.      Mouth/Throat:      Pharynx: Posterior oropharyngeal erythema present. No oropharyngeal exudate.      Comments: Tonsils normal, uvula midline  Eyes:      Conjunctiva/sclera: Conjunctivae normal.   Cardiovascular:      Rate and Rhythm: Normal rate and regular rhythm.      Heart sounds: Normal heart sounds. No murmur heard.  Pulmonary:      Effort: Pulmonary effort is normal.      Breath sounds: Normal breath sounds. No wheezing.      Comments: Moist cough noted  Skin:      General: Skin is warm and dry.   Neurological:      General: No focal deficit present.      Mental Status: She is alert.   Psychiatric:         Mood and Affect: Mood normal.   Assessment/Plan   Problem List Items Addressed This Visit    None  Visit Diagnoses         Codes    Upper respiratory tract infection, unspecified type    -  Primary J06.9    Relevant Medications    benzonatate (Tessalon) 100 mg capsule    Other Relevant Orders    POCT BinaxNOW Covid-19 Ag Card manually resulted          Patient is positive for COVID. She would like to start an antiviral.      Discussed treatment options available for COVID infection, reviewed risks and benefits of the medication prescribed, and all questions were answered. Patient is on trazodone and is not a candidate for paxlovid as it interacts with this medication. Patient denies any chance of pregnancy. Will start pt on Lagevrio, as there are no contraindications to this medication other than pregnancy.     Pt reminded to call 911/seek emergency medical care if she experiences any negative side effects to the medications; she agreed.     Will start pt on tessalon perles. Discussed that these can make her dizzy/drowsy so she is to avoid driving or any other activity requiring concentration; she agreed. Advised patient on use of humidifier and hot steam treatments. Discussed that patient is to drink plenty of fluids and stay well hydrated. Can take tylenol as needed for any fevers or discomfort. Patient can use flonase as well. Discussed that patient is to go to the ER for any chest pain, difficulty breathing, shortness of breath or new/concerning symptoms; she agreed. Pt reminded to self quarantine for at least five days since symptoms started and until she is feeling better. she was advised to wear a mask for at least 10 days after quarantine when in public; she agreed.

## 2023-12-12 ENCOUNTER — TELEPHONE (OUTPATIENT)
Dept: PHARMACY | Facility: HOSPITAL | Age: 43
End: 2023-12-12
Payer: COMMERCIAL

## 2023-12-12 NOTE — PROGRESS NOTES
EDPD Note: Antibiotics Reviewed and Warranted    Contacted Ms. Jennifer Blackwood regarding a positive wound culture that was taken during their recent emergency room visit. I completed education with patient. The patient is being treated appropriately with Bactrim DS BID for 7 days. Patient states she is doing well and has no concerns.     Susceptibility data from last 90 days.  Collected Specimen Info Organism Clindamycin Erythromycin Oxacillin Tetracycline Trimethoprim/Sulfamethoxazole Vancomycin   12/09/23 Tissue/Biopsy from Wound/Tissue Methicillin Susceptible Staphylococcus aureus (MSSA) R R S S S S        No further follow up needed from EDPD Team.     Saniya Garcia, PharmD

## 2023-12-18 ENCOUNTER — HOSPITAL ENCOUNTER (EMERGENCY)
Facility: HOSPITAL | Age: 43
Discharge: HOME | End: 2023-12-18
Payer: COMMERCIAL

## 2023-12-18 VITALS — HEIGHT: 65 IN | RESPIRATION RATE: 18 BRPM | BODY MASS INDEX: 32.51 KG/M2 | WEIGHT: 195.11 LBS

## 2023-12-18 DIAGNOSIS — T81.31XS WOUND DEHISCENCE, SURGICAL, SEQUELA: Primary | ICD-10-CM

## 2023-12-18 PROCEDURE — 99283 EMERGENCY DEPT VISIT LOW MDM: CPT

## 2023-12-18 RX ORDER — KETOROLAC TROMETHAMINE 10 MG/1
10 TABLET, FILM COATED ORAL EVERY 6 HOURS PRN
Qty: 20 TABLET | Refills: 0 | Status: SHIPPED | OUTPATIENT
Start: 2023-12-18 | End: 2024-05-08

## 2023-12-18 ASSESSMENT — LIFESTYLE VARIABLES
HAVE YOU EVER FELT YOU SHOULD CUT DOWN ON YOUR DRINKING: NO
REASON UNABLE TO ASSESS: NO
HAVE PEOPLE ANNOYED YOU BY CRITICIZING YOUR DRINKING: NO
EVER HAD A DRINK FIRST THING IN THE MORNING TO STEADY YOUR NERVES TO GET RID OF A HANGOVER: NO
EVER FELT BAD OR GUILTY ABOUT YOUR DRINKING: NO

## 2023-12-18 ASSESSMENT — PAIN - FUNCTIONAL ASSESSMENT: PAIN_FUNCTIONAL_ASSESSMENT: 0-10

## 2023-12-18 ASSESSMENT — PAIN SCALES - GENERAL: PAINLEVEL_OUTOF10: 8

## 2023-12-18 NOTE — ED PROVIDER NOTES
"HPI   Chief Complaint   Patient presents with    Wound Check     \"Here to get wound checked on the palm of left hand.\"       43-year-old female past medical history significant for recent carpal tunnel surgery of the left hand with complications due to infection and dehiscence presents emergency department today for evaluation of surgical incision.  Patient tells me she was recently seen and examined here in the emergency department was ultimately discharged home and the foreign and then was placed on antibiotics based on the cultures done.  Patient tells me that she finished her Bactrim over the weekend.  Patient tells me that she was concerned that the wound was infected and therefore she called the primary care office and was told to present to the emergency department for evaluation.  Patient denies any fever or chills.  Patient rates the pain an 8/10 on the pain scale.      History provided by:  Patient   used: No                        [place] Coma Scale Score: 15                  Patient History   Past Medical History:   Diagnosis Date    Adjustment disorder 2000    Allergic 1995    Anxiety 1997    Chronic pain disorder 1/11/11    Depression 1997    Eczema 1991    GERD (gastroesophageal reflux disease) 1997    Headache     Liver disease 2022    Other abnormal auditory perceptions, unspecified ear 01/19/2018    Abnormal auditory perception    Other bursitis of knee, unspecified knee     Pes anserine bursitis    Panic attack     Panic disorder 1997    PTSD (post-traumatic stress disorder) 1/11/11    Sleep difficulties     Urinary tract infection 1997    Varicella 1986     Past Surgical History:   Procedure Laterality Date    CHOLECYSTECTOMY  2022    ENDOMETRIAL ABLATION  2009    HYSTERECTOMY  1/11/11    OTHER SURGICAL HISTORY  12/22/2022    Cystoscopy    OTHER SURGICAL HISTORY  12/22/2022    Vaginal lesion ablation    OTHER SURGICAL HISTORY  09/15/2022    Colonoscopy    OTHER SURGICAL " HISTORY  09/15/2022    Esophagogastroduodenoscopy    OTHER SURGICAL HISTORY  2022    Cholecystectomy laparoscopic    OTHER SURGICAL HISTORY  2019    Tubal ligation bilateral    OTHER SURGICAL HISTORY  2019    Hysterectomy    TUBAL LIGATION      WISDOM TOOTH EXTRACTION  2000     Family History   Problem Relation Name Age of Onset    Alcohol abuse Mother Pretty     Arthritis Mother Pretty     Asthma Mother Pretty     COPD Mother Pretty     Depression Mother Pretty     Hypertension Mother Pretty     Mental illness Mother Pretty     Miscarriages / Stillbirths Mother Pretty     Anxiety disorder Mother Pretty     Atrial fibrillation Mother Pretty     Alcohol abuse Father Chidi     COPD Father Chidi     Mental illness Father Chidi     Hearing loss Father Chidi     Celiac disease Father Chidi     Alcohol abuse Brother Vick     Depression Brother Vick     Diabetes Brother Vick     Drug abuse Brother Vick     Mental illness Brother Vick     Bipolar disorder Brother Vick     Other (cannabis use disorder) Brother Vick     Anxiety disorder Brother Vick     Paranoid behavior Brother Vick     Schizophrenia Brother Vick     Self-Injury Brother Vick     Suicide Attempts Brother Vick     Depression Daughter Kinadee     Genetic Testing Daughter Kinadee     Mental illness Daughter Kinadee     Blood Disorder Daughter Kinadee     Clotting disorder Daughter Kinadee     Depression Son Noel     Mental illness Son Noel     Depression Son Efren     Anesthesia related problems Son Efren     Breast cancer Maternal Grandmother Chantale Chris     Cancer Maternal Grandmother Chantale Chris     Diabetes Maternal Grandmother Chantale Chris     Heart disease Maternal Grandmother Chantale Chris     Breast cancer Other      Lung cancer Other       labor Sister Oksana Bhandarimaria elena      Social History     Tobacco Use    Smoking status: Every Day     Packs/day: 1.00     Years: 15.00     Additional pack years: 0.00     Total pack  years: 15.00     Types: Cigarettes    Smokeless tobacco: Never    Tobacco comments:     More than 15 years   Vaping Use    Vaping Use: Never used   Substance Use Topics    Alcohol use: Not Currently     Comment: Used to be a social drinker    Drug use: Never       Physical Exam   ED Triage Vitals [12/18/23 1035]   Temp Pulse Resp BP   -- -- 18 --      SpO2 Temp Source Heart Rate Source Patient Position   -- Tympanic Monitor Sitting      BP Location FiO2 (%)     Right arm --       Physical Exam  Vitals and nursing note reviewed.   Constitutional:       Appearance: Normal appearance.   Cardiovascular:      Rate and Rhythm: Normal rate and regular rhythm.   Pulmonary:      Effort: Pulmonary effort is normal.   Skin:     General: Skin is warm and dry.      Capillary Refill: Capillary refill takes less than 2 seconds.      Comments: 2 cm surgical wound to palm of left hand.  Wound bed is with granulation tissue, no drainage noted, no drainage on dressing.  No erythema of the surrounding tissue   Neurological:      General: No focal deficit present.      Mental Status: She is alert and oriented to person, place, and time.   Psychiatric:         Mood and Affect: Mood normal.         Behavior: Behavior normal.         ED Course & MDM   Diagnoses as of 12/18/23 1044   Wound dehiscence, surgical, sequela       Medical Decision Making  43-year-old female past medical history significant for recent carpal tunnel surgery of the left hand with complications due to infection and dehiscence presents emergency department today for evaluation of surgical incision.  Patient tells me she was recently seen and examined here in the emergency department was ultimately discharged home and the foreign and then was placed on antibiotics based on the cultures done.  Patient tells me that she finished her Bactrim over the weekend.  Patient tells me that she was concerned that the wound was infected and therefore she called the primary care  office and was told to present to the emergency department for evaluation.  Patient denies any fever or chills.  Patient rates the pain an 8/10 on the pain scale.    Patient seen examined emergency department; patient is healthy and nontoxic in appearance.  Left hand at proximal edge of the home does have a surgical incision.  Surgical wound is without erythema and there is no purulent drainage.  There is granulation tissue admitted in the wound bed.  Patient I discussed that the area where her incision is is difficult for closure.  We discussed signs and symptoms of infection.  I did reassure her that the wound is not infected at this time.  Patient tells me that she does have a appointment with Dr. Cole who did the surgery on Wednesday, 12/20/2023.  I instructed patient to continue using the Bactroban ointment as prescribed by her previous provider.  I did encourage her to keep this appointment.  Patient tells me that she has been taking Tylenol and ibuprofen at home without pain relief.  Patient sent with prescription for Toradol.  All patient's questions and concerns were addressed prior to discharge.  Patient discharged home in stable condition.        Procedure  Procedures     Debra Lopez, PABLO-NASREEN  12/18/23 1049

## 2023-12-20 ENCOUNTER — OFFICE VISIT (OUTPATIENT)
Dept: ORTHOPEDIC SURGERY | Facility: CLINIC | Age: 43
End: 2023-12-20
Payer: COMMERCIAL

## 2023-12-20 DIAGNOSIS — G56.02 CARPAL TUNNEL SYNDROME, LEFT: Primary | ICD-10-CM

## 2023-12-20 PROCEDURE — 3008F BODY MASS INDEX DOCD: CPT | Performed by: ORTHOPAEDIC SURGERY

## 2023-12-20 PROCEDURE — 99024 POSTOP FOLLOW-UP VISIT: CPT | Performed by: ORTHOPAEDIC SURGERY

## 2023-12-20 NOTE — LETTER
December 25, 2023     Jean Roman DO  [address]  [place]  [facility] [address]    Patient: Martina Blackwood   YOB: 1980   Date of Visit: 12/20/2023       Dear Dr. Jean Roman DO:    Thank you for referring Martina Blackwood to me for evaluation. Below are my notes for this consultation.  If you have questions, please do not hesitate to call me. I look forward to following your patient along with you.       Sincerely,     Torin Cole MD      CC: No Recipients  ______________________________________________________________________________________    CHIEF COMPLAINT         Postop check    ASSESSMENT + PLAN    7 weeks postop from left carpal tunnel release, complicated by wound dehiscence    It is certainly unusual to have a wound dehiscence this far out from surgery.  There may have been some low-grade infection, but even then, I would have expected the wound to have looked worse at the time I saw you last.  At this point there is no sign of any ongoing infection.  Watch for the warning signs of recurrent infection that I reviewed, and contact my office immediately if you should notice any of these.  I strongly recommend doing wet-to-dry dressing changes at least once daily in order to speed healing and minimize scar formation.  I demonstrated the technique today.    Follow-up with any additional concerns.        HISTORY OF PRESENT ILLNESS       Patient returns today, 3-1/2 weeks after last visit and now about 7 weeks postop from left carpal tunnel release.  The nylon sutures were removed on postop day 25 at her last visit.  The next day she was in the emergency room with a wound dehiscence.  She denies any particular trauma to bring this on.  She did have history of dehiscence on the other side as well with dissolving sutures, which was the reason for the nylon and the delay in removal on this side.  Her numbness and tingling remain well resolved.  The wound is healing again.  She had a  course of Bactrim from the ER physician and has now completed this.  No difficulty with finger motion.      PHYSICAL EXAM       Band-Aid removed.  The full length of the wound has gapped about 5 mm.  There is good granulation tissue at the base.  There is fairly exuberant surrounding maceration.  There is serous fluid on the bandage but no tamika pus.  No additional expressible fluid.  No surrounding fluctuance or increased skin temperature.  Full composite finger flexion extension with full intrinsic plus minus posture.  Good wrist motion.  Sensation intact to light touch in all distributions.  Capillary refill less than 2 seconds.  2+ radial and ulnar pulses.      IMAGING / LABS / EMGs        None today      Electronically Signed      JOELLEN Cole MD      [facility]opa[facility] Hand [facility]      [phone]

## 2023-12-25 NOTE — PROGRESS NOTES
CHIEF COMPLAINT         Postop check    ASSESSMENT + PLAN    7 weeks postop from left carpal tunnel release, complicated by wound dehiscence    It is certainly unusual to have a wound dehiscence this far out from surgery.  There may have been some low-grade infection, but even then, I would have expected the wound to have looked worse at the time I saw you last.  At this point there is no sign of any ongoing infection.  Watch for the warning signs of recurrent infection that I reviewed, and contact my office immediately if you should notice any of these.  I strongly recommend doing wet-to-dry dressing changes at least once daily in order to speed healing and minimize scar formation.  I demonstrated the technique today.    Follow-up with any additional concerns.        HISTORY OF PRESENT ILLNESS       Patient returns today, 3-1/2 weeks after last visit and now about 7 weeks postop from left carpal tunnel release.  The nylon sutures were removed on postop day 25 at her last visit.  The next day she was in the emergency room with a wound dehiscence.  She denies any particular trauma to bring this on.  She did have history of dehiscence on the other side as well with dissolving sutures, which was the reason for the nylon and the delay in removal on this side.  Her numbness and tingling remain well resolved.  The wound is healing again.  She had a course of Bactrim from the ER physician and has now completed this.  No difficulty with finger motion.      PHYSICAL EXAM       Band-Aid removed.  The full length of the wound has gapped about 5 mm.  There is good granulation tissue at the base.  There is fairly exuberant surrounding maceration.  There is serous fluid on the bandage but no tamika pus.  No additional expressible fluid.  No surrounding fluctuance or increased skin temperature.  Full composite finger flexion extension with full intrinsic plus minus posture.  Good wrist motion.  Sensation intact to light touch in  all distributions.  Capillary refill less than 2 seconds.  2+ radial and ulnar pulses.      IMAGING / LABS / EMGs        None today      Electronically Signed      JOELLEN Cole MD      Orthopaedic Hand Surgery      [phone]

## 2024-01-18 ENCOUNTER — TELEMEDICINE (OUTPATIENT)
Dept: BEHAVIORAL HEALTH | Facility: CLINIC | Age: 44
End: 2024-01-18
Payer: COMMERCIAL

## 2024-01-18 DIAGNOSIS — F41.1 GENERALIZED ANXIETY DISORDER: ICD-10-CM

## 2024-01-18 DIAGNOSIS — F33.1 MODERATE EPISODE OF RECURRENT MAJOR DEPRESSIVE DISORDER (MULTI): ICD-10-CM

## 2024-01-18 DIAGNOSIS — Z00.00 HEALTH CARE MAINTENANCE: ICD-10-CM

## 2024-01-18 DIAGNOSIS — G47.09 OTHER INSOMNIA: ICD-10-CM

## 2024-01-18 DIAGNOSIS — F43.10 PTSD (POST-TRAUMATIC STRESS DISORDER): ICD-10-CM

## 2024-01-18 DIAGNOSIS — Z79.899 MEDICATION MANAGEMENT: ICD-10-CM

## 2024-01-18 PROCEDURE — 99214 OFFICE O/P EST MOD 30 MIN: CPT | Performed by: CLINICAL NURSE SPECIALIST

## 2024-01-18 RX ORDER — TRAZODONE HYDROCHLORIDE 50 MG/1
50-100 TABLET ORAL NIGHTLY
Qty: 60 TABLET | Refills: 1 | Status: SHIPPED | OUTPATIENT
Start: 2024-01-18 | End: 2024-02-26 | Stop reason: SDUPTHER

## 2024-01-18 RX ORDER — LAMOTRIGINE 25 MG/1
25 TABLET ORAL DAILY
Qty: 30 TABLET | Refills: 1 | Status: SHIPPED | OUTPATIENT
Start: 2024-01-18 | End: 2024-02-26 | Stop reason: SDUPTHER

## 2024-01-18 RX ORDER — LAMOTRIGINE 100 MG/1
100 TABLET ORAL DAILY
Qty: 30 TABLET | Refills: 1 | Status: SHIPPED | OUTPATIENT
Start: 2024-01-18 | End: 2024-02-26 | Stop reason: SDUPTHER

## 2024-01-18 RX ORDER — ALPRAZOLAM 0.5 MG/1
0.5 TABLET ORAL DAILY PRN
Qty: 10 TABLET | Refills: 1 | Status: SHIPPED | OUTPATIENT
Start: 2024-01-18

## 2024-01-18 RX ORDER — DESVENLAFAXINE 50 MG/1
50 TABLET, EXTENDED RELEASE ORAL DAILY
Qty: 30 TABLET | Refills: 1 | Status: SHIPPED | OUTPATIENT
Start: 2024-01-18 | End: 2024-02-26 | Stop reason: SDUPTHER

## 2024-01-18 NOTE — ASSESSMENT & PLAN NOTE
Continue desvenlafaxine as noted above. Continue individual therapy.   Continue lamotrigine 125 mg PO daily- refill sent to pharmacy today.     Advised of potentially fatal rash (i.e. Mcdowell-Luis Syndrome) in 1:10,000 individuals. Agrees to stop medication and seek medical attention immediately if rash or blistering of the mucosal surfaces develops; also discussed risk for benign drug-induced rash, and advised to cease medication and seek immediate medical attention to r/o SJS given its potential lethality. Further advised that the risk for this reaction increases if proper dose titration is not followed, and that after 2 days of non-adherence, re-titration is necessary.

## 2024-01-18 NOTE — ASSESSMENT & PLAN NOTE
Ongoing difficulty with sleep onset and duration. Could consider further titration of trazodone. Will defer while evaluating benefit from recent change in antidepressant.   Continue trazodone  mg PO at bedtime.-refill sent to pharmacy today.

## 2024-01-18 NOTE — ASSESSMENT & PLAN NOTE
Tolerated cross-titration of duloxetine in favor of desvenlafaxine. Has been at 50 mg dose for ~3 weeks, too soon to fully assess benefit, but limited response so far. Continue at current dose for now.   Continue desvenlafaxine ER 50 mg PO daily. -refill sent to pharmacy today.   Continue alprazolam PRN 0.5 mg PO PRN for severe anxiety/panic (short supply- 10 tabs per fill). Taking sparingly. Reasonable to continue while cross-titrating antidepressant medication.   Will check UDS given that refill will be provided today.   Continue engagement in individual therapy.     Reviewed OARRS, no discrepancies or concerns. Discussed risk of motor/cognitive impairment, sedation, dependence, tolerance, abuse, withdrawal sequelae, accidental overdose, life-threatening respiratory depression (bhavesh. in combination with alcohol and/or opioids), excess sedation in combination with other sedating medicines.

## 2024-02-23 DIAGNOSIS — R51.9 NEW ONSET HEADACHE: ICD-10-CM

## 2024-02-23 RX ORDER — AMITRIPTYLINE HYDROCHLORIDE 10 MG/1
20 TABLET, FILM COATED ORAL NIGHTLY
Qty: 60 TABLET | Refills: 3 | Status: SHIPPED | OUTPATIENT
Start: 2024-02-23 | End: 2024-05-08 | Stop reason: SDUPTHER

## 2024-02-26 ENCOUNTER — TELEMEDICINE (OUTPATIENT)
Dept: BEHAVIORAL HEALTH | Facility: CLINIC | Age: 44
End: 2024-02-26
Payer: COMMERCIAL

## 2024-02-26 DIAGNOSIS — F33.1 MODERATE EPISODE OF RECURRENT MAJOR DEPRESSIVE DISORDER (MULTI): ICD-10-CM

## 2024-02-26 DIAGNOSIS — F41.1 GENERALIZED ANXIETY DISORDER: ICD-10-CM

## 2024-02-26 DIAGNOSIS — F43.10 PTSD (POST-TRAUMATIC STRESS DISORDER): ICD-10-CM

## 2024-02-26 PROCEDURE — 99214 OFFICE O/P EST MOD 30 MIN: CPT | Performed by: CLINICAL NURSE SPECIALIST

## 2024-02-26 PROCEDURE — 3008F BODY MASS INDEX DOCD: CPT | Performed by: CLINICAL NURSE SPECIALIST

## 2024-02-26 RX ORDER — TRAZODONE HYDROCHLORIDE 50 MG/1
50-100 TABLET ORAL NIGHTLY
Qty: 60 TABLET | Refills: 2 | Status: SHIPPED | OUTPATIENT
Start: 2024-02-26 | End: 2024-05-26

## 2024-02-26 RX ORDER — LAMOTRIGINE 100 MG/1
100 TABLET ORAL DAILY
Qty: 30 TABLET | Refills: 2 | Status: SHIPPED | OUTPATIENT
Start: 2024-02-26 | End: 2024-05-26

## 2024-02-26 RX ORDER — BUSPIRONE HYDROCHLORIDE 5 MG/1
TABLET ORAL
Qty: 60 TABLET | Refills: 1 | Status: SHIPPED | OUTPATIENT
Start: 2024-02-26

## 2024-02-26 RX ORDER — DESVENLAFAXINE 50 MG/1
50 TABLET, EXTENDED RELEASE ORAL DAILY
Qty: 30 TABLET | Refills: 2 | Status: SHIPPED | OUTPATIENT
Start: 2024-02-26 | End: 2024-05-26

## 2024-02-26 RX ORDER — LAMOTRIGINE 25 MG/1
25 TABLET ORAL DAILY
Qty: 30 TABLET | Refills: 2 | Status: SHIPPED | OUTPATIENT
Start: 2024-02-26 | End: 2024-05-26

## 2024-02-26 NOTE — ASSESSMENT & PLAN NOTE
Tolerating desvenlafaxine with fair benefit for depression, but limited benefit for anxious sx. Discussed that current dose is typically maximum recommended daily dose, though in some cases dose can be titrated to 100 mg PO daily. Given that response to medication has been fair, but not robust, and given limited benefit for anxiety, discussed augmenting with buspirone and she is agreeable. Discussed indication(s), reviewed risks/benefits/alternatives.    Start buspirone 5 mg PO at bedtime x 2 weeks, then increase to 5 mg PO BID.   Continue desvenlafaxine ER 50 mg PO daily. -refill sent to pharmacy today.   Continue alprazolam PRN 0.5 mg PO PRN for severe anxiety/panic (short supply- 10 tabs per fill). Taking sparingly. Reasonable to continue while cross-titrating antidepressant medication.   Has not yet completed UDS- orders placed at last visit. Encouraged to complete prior to next appointment.   Continue engagement in individual therapy.     Reviewed OARRS, no discrepancies or concerns. Discussed risk of motor/cognitive impairment, sedation, dependence, tolerance, abuse, withdrawal sequelae, accidental overdose, life-threatening respiratory depression (bhavesh. in combination with alcohol and/or opioids), excess sedation in combination with other sedating medicines.

## 2024-02-26 NOTE — ASSESSMENT & PLAN NOTE
Reports improvement in sleep onset, but ongoing difficulty with sleep duration. For now, continue trazodone at current dose while evaluating medication changes noted above.   Continue trazodone  mg PO at bedtime. -refill sent to pharmacy today.

## 2024-02-26 NOTE — PROGRESS NOTES
"Outpatient Psychiatry      Subjective   Jennifer KAILA Jaison \"Martina\", is a 43 y.o. female,  seen in follow-up.      Assessment/Plan   Problem List Items Addressed This Visit             ICD-10-CM    Generalized anxiety disorder F41.1     Tolerating desvenlafaxine with fair benefit for depression, but limited benefit for anxious sx. Discussed that current dose is typically maximum recommended daily dose, though in some cases dose can be titrated to 100 mg PO daily. Given that response to medication has been fair, but not robust, and given limited benefit for anxiety, discussed augmenting with buspirone and she is agreeable. Discussed indication(s), reviewed risks/benefits/alternatives.    Start buspirone 5 mg PO at bedtime x 2 weeks, then increase to 5 mg PO BID.   Continue desvenlafaxine ER 50 mg PO daily. -refill sent to pharmacy today.   Continue alprazolam PRN 0.5 mg PO PRN for severe anxiety/panic (short supply- 10 tabs per fill). Taking sparingly. Reasonable to continue while cross-titrating antidepressant medication.   Has not yet completed UDS- orders placed at last visit. Encouraged to complete prior to next appointment.   Continue engagement in individual therapy.     Reviewed OARRS, no discrepancies or concerns. Discussed risk of motor/cognitive impairment, sedation, dependence, tolerance, abuse, withdrawal sequelae, accidental overdose, life-threatening respiratory depression (bhavesh. in combination with alcohol and/or opioids), excess sedation in combination with other sedating medicines.           Relevant Medications    busPIRone (Buspar) 5 mg tablet    desvenlafaxine 50 mg 24 hr tablet    traZODone (Desyrel) 50 mg tablet    Other Relevant Orders    Follow Up In Psychiatry    PTSD (post-traumatic stress disorder) F43.10     Continue medications and individual therapy as noted above.          Relevant Medications    desvenlafaxine 50 mg 24 hr tablet    lamoTRIgine (LaMICtal) 100 mg tablet    lamoTRIgine " (LaMICtal) 25 mg tablet    traZODone (Desyrel) 50 mg tablet    Other Relevant Orders    Follow Up In Psychiatry    Moderate episode of recurrent major depressive disorder (CMS/HCC) F33.1     Continue desvenlafaxine as noted above. Has noted modest improvement in sx since being on 50 mg dose.   Continue lamotrigine 125 mg PO daily- refill sent to pharmacy today.   Continue individual therapy.     Advised of potentially fatal rash (i.e. Mcdowell-Luis Syndrome) in 1:10,000 individuals. Agrees to stop medication and seek medical attention immediately if rash or blistering of the mucosal surfaces develops; also discussed risk for benign drug-induced rash, and advised to cease medication and seek immediate medical attention to r/o SJS given its potential lethality. Further advised that the risk for this reaction increases if proper dose titration is not followed, and that after 2 days of non-adherence, re-titration is necessary.          Relevant Medications    desvenlafaxine 50 mg 24 hr tablet    lamoTRIgine (LaMICtal) 100 mg tablet    lamoTRIgine (LaMICtal) 25 mg tablet    traZODone (Desyrel) 50 mg tablet    Other Relevant Orders    Follow Up In Psychiatry       Follow-up in 4-6 weeks.    Risk Assessment:  Risk Assessment: Jennifer Blackwood is currently a low acute risk of suicide and self-harm due to no past suicide attempt(s) and not currently endorsing thoughts of suicide. Jennifer Blackwood is currently a low acute risk of violence and harm to others due to no past history of violence and not currently threatening others.  Suicidal Risk Factors: , history of trauma/abuse, chronic medical illness, current psychiatric illness, and panic attacks  Violence Risk Factors: none  Protective Factors: strong coping skills, sense of responsibility towards family, social support/connectedness, child related concerns/living with child <18 years, positive family relationships, hopefulness/future orientation, and  marriage/partnership     Provided with crisis/emergency resources, including the [facility] Crisis Hotline 1-275.169.3648, Crisis Text Line (text 5XRDY ha 158808) and the National Suicide Prevention Lifeline hotline 1-706.830.1310. Agrees to call 988 or go to the nearest emergency department if she feels unsafe, or has suicidal thinking with a plan or intent.       Reason for Visit:  Follow-up for medication management.     HPI:  Since her last visit,     Things have been very up and down.   Son decided he was going to move out of state, but then abruptly had to return. Was in a very toxic situation.   Has been a bit of an adjustment having him back home as well.   Very frustrated and angry with family that he was planning to stay with, and the way they treated him, and glad she was able to get him home quickly.     Not sleeping the greatest.   One night gets 1.5 hours, the next night maybe 5 hours. Very broken sleep, sometimes up for hours in the middle of the night.   Not having as much of an issue with falling asleep since titrating trazodone dose.   Having a lot of breakthrough anxiety- mind races, irritability, disrupted concentration, GI upset.   Depressive sx have been overall stable. Mood has been ok, not as low for the past few months. Anxiety has been the biggest challenge.  Feels like desvenlafaxine has had an impact on degree to which she becomes depressed. Especially notices that she isn't withdrawing from friends/family as much as she did in the past.     Denies suicidal ideation or a passive death wish.     No new medications or health problems.       Current Psychiatric Medications:  Desvenlafaxine 50 mg PO daily  Lamotrigine 125 mg PO daily  Trazodone  mg PO at bedtime PRN for sleep  Continue alprazolam 0.5 mg PO PRN for severe anxiety      Record Review: brief     Medical Review Of Systems:  Pertinent items are noted in HPI.    Psychiatric Review Of Systems:  As noted in HPI.     "    Objective   Mental Status Exam  General Appearance: Well groomed, appropriate eye contact  Attitude/Behavior: Cooperative  Motor: No psychomotor agitation or retardation, no tremor or other abnormal movements  Speech: Normal rate, volume, prosody  Gait/Station: Other:(comment) (not observed- virtual)  Mood: \"I've been pretty stressed.\"  Affect: Constricted, Anxious, Congruent with mood and topic of conversation  Thought Process: Linear, goal directed  Thought Associations: No loosening of associations  Thought Content: Other: (comment) (anxious themes.)  Perception: No perceptual abnormalities noted  Sensorium: Alert and oriented to person, place, time and situation  Insight: Intact  Judgement: Intact  Cognition: Cognitively intact to conversational testing with respect to attention, orientation, fund of knowledge, recent and remote memory, and language    Vitals:  There were no vitals filed for this visit.    Labs:  not applicable        Allyn Acosta, APRN-CNP, APRN-CNS  "

## 2024-02-26 NOTE — ASSESSMENT & PLAN NOTE
Continue desvenlafaxine as noted above. Has noted modest improvement in sx since being on 50 mg dose.   Continue lamotrigine 125 mg PO daily- refill sent to pharmacy today.   Continue individual therapy.     Advised of potentially fatal rash (i.e. Mcdowell-Luis Syndrome) in 1:10,000 individuals. Agrees to stop medication and seek medical attention immediately if rash or blistering of the mucosal surfaces develops; also discussed risk for benign drug-induced rash, and advised to cease medication and seek immediate medical attention to r/o SJS given its potential lethality. Further advised that the risk for this reaction increases if proper dose titration is not followed, and that after 2 days of non-adherence, re-titration is necessary.

## 2024-03-25 VITALS — SYSTOLIC BLOOD PRESSURE: 111 MMHG | DIASTOLIC BLOOD PRESSURE: 81 MMHG

## 2024-03-28 ENCOUNTER — TELEMEDICINE (OUTPATIENT)
Dept: BEHAVIORAL HEALTH | Facility: CLINIC | Age: 44
End: 2024-03-28
Payer: COMMERCIAL

## 2024-03-28 DIAGNOSIS — F33.1 MODERATE EPISODE OF RECURRENT MAJOR DEPRESSIVE DISORDER (MULTI): ICD-10-CM

## 2024-03-28 DIAGNOSIS — F41.1 GENERALIZED ANXIETY DISORDER: ICD-10-CM

## 2024-03-28 DIAGNOSIS — F43.10 PTSD (POST-TRAUMATIC STRESS DISORDER): ICD-10-CM

## 2024-03-28 PROCEDURE — 99214 OFFICE O/P EST MOD 30 MIN: CPT | Performed by: CLINICAL NURSE SPECIALIST

## 2024-03-28 PROCEDURE — 3008F BODY MASS INDEX DOCD: CPT | Performed by: CLINICAL NURSE SPECIALIST

## 2024-03-28 NOTE — PROGRESS NOTES
"Outpatient Psychiatry      Subjective   Jennifer KAILA Colemanjade \"Martina\", is a 43 y.o. female,  seen in follow-up.        Assessment/Plan   Problem List Items Addressed This Visit             ICD-10-CM    Generalized anxiety disorder F41.1     Tolerating desvenlafaxine with good benefit for depression, but limited benefit for anxious sx. Discussed that current dose is typically maximum recommended daily dose, though in some cases dose can be titrated to 100 mg PO daily. Started buspirone ~1.5 weeks ago as adjunct for anxiety. Tolerating without side effects, will increase to BID dosing later this week.   In ~3 days, increase buspirone to 5 mg PO BID.   Continue desvenlafaxine ER 50 mg PO daily. -no refill needed today.    Continue alprazolam PRN 0.5 mg PO PRN for severe anxiety/panic (short supply- 10 tabs per fill). Taking sparingly- last fill late January 2023. Reasonable to continue while cross-titrating antidepressant medication.  Has not yet completed UDS- orders placed in January. She will complete this afternoon.   Continue engagement in individual therapy.     Reviewed OARRS, no discrepancies or concerns. Discussed risk of motor/cognitive impairment, sedation, dependence, tolerance, abuse, withdrawal sequelae, accidental overdose, life-threatening respiratory depression (bhavesh. in combination with alcohol and/or opioids), excess sedation in combination with other sedating medicines.  Noted that she was recently rx'd diazepam intravaginally for chronic pelvic pain.          Relevant Orders    Follow Up In Psychiatry    PTSD (post-traumatic stress disorder) F43.10     Continue medications and individual therapy as noted above.          Relevant Orders    Follow Up In Psychiatry    Moderate episode of recurrent major depressive disorder (CMS/HCC) F33.1     Continue desvenlafaxine as noted above.   Continue lamotrigine 125 mg PO daily-  no refill needed today.    Continue individual therapy.     Advised of potentially " "fatal rash (i.e. Mcdowell-Luis Syndrome) in 1:10,000 individuals. Agrees to stop medication and seek medical attention immediately if rash or blistering of the mucosal surfaces develops; also discussed risk for benign drug-induced rash, and advised to cease medication and seek immediate medical attention to r/o SJS given its potential lethality. Further advised that the risk for this reaction increases if proper dose titration is not followed, and that after 2 days of non-adherence, re-titration is necessary.          Relevant Orders    Follow Up In Psychiatry       Follow-up in 6-8 weeks.     Risk Assessment:  Risk Assessment: Jennifer Blackwood is currently a low acute risk of suicide and self-harm due to no past suicide attempt(s) and not currently endorsing thoughts of suicide. Jennifer Blackowod is currently a low acute risk of violence and harm to others due to no past history of violence and not currently threatening others.  Suicidal Risk Factors: , history of trauma/abuse, chronic medical illness, current psychiatric illness, and panic attacks  Violence Risk Factors: none  Protective Factors: strong coping skills, sense of responsibility towards family, social support/connectedness, child related concerns/living with child <18 years, positive family relationships, hopefulness/future orientation, and marriage/partnership     Provided with crisis/emergency resources, including the [facility] Crisis Hotline 1-683.637.3860, Crisis Text Line (text 4HZRF to 357959) and the National Suicide Prevention Lifeline hotline 1-403.969.5648. Agrees to call 988 or go to the nearest emergency department if she feels unsafe, or has suicidal thinking with a plan or intent.        Reason for Visit:  Follow-up for medication management.     HPI:  Since her last visit,     Has been, \"stable... that's the best I can say.\"  Started back into PT, helpful but causes a lot of pain.   Arguing a lot with her son. Kind " "of falling back into his old ways   Saw therapist yesterday and talked about the fact that she isn't making time to take care of herself. Trying to be ore deliberate in doing so. Knows that she feels better when she does.     Didn't start buspirone right away because she was feeling ill. Has been taking 5 mg at bedtime for ~1.5 weeks. No side effects. Comfortable with continuing plan for titration.   No significant changes in anxious sx. \"It hasn't gotten worse, but it hasn't gotten better.\"     Taking alprazolam PRN occasionally. Working with therapist on strategies to manage anxiety/panic. Planning to complete urine drug screen later today.   Sleep has been really broken recently- not sure if trazodone is working. Falls asleep ok, but waking very early in the morning, and can't get back to sleep. Doesn't want to take another PRN at that time because she worries she'll be too sedated or unable to wake up on time.   No significant changes in appetite or weight.   No changes in energy level.   Denies significant depressive sx otherwise.     Denies suicidal ideation or a passive death wish.     No new medications or health problems.       Current Psychiatric Medications:  Desvenlafaxine 50 mg PO daily  Lamotrigine 125 mg PO daily  Trazodone  mg PO at bedtime PRN for sleep  Continue alprazolam 0.5 mg PO PRN for severe anxiety      Record Review: brief     Medical Review Of Systems:  Pertinent items are noted in HPI.    Psychiatric Review Of Systems:  As noted in HPI.        Objective   Mental Status Exam  General Appearance: Well groomed, appropriate eye contact (lying down throughout encounter)  Attitude/Behavior: Cooperative  Motor: No psychomotor agitation or retardation, no tremor or other abnormal movements  Speech: Normal rate, volume, prosody  Gait/Station: Other:(comment) (not observed- virtual)  Mood: \"Stable.\"  Affect: Euthymic, full-range, Congruent with mood and topic of conversation  Thought Process: " Linear, goal directed  Thought Associations: No loosening of associations  Thought Content: Other: (comment) (some anxious themes r/t dynamics with son.)  Perception: No perceptual abnormalities noted  Sensorium: Alert and oriented to person, place, time and situation  Insight: Intact  Judgement: Intact  Cognition: Cognitively intact to conversational testing with respect to attention, orientation, fund of knowledge, recent and remote memory, and language    Vitals:  There were no vitals filed for this visit.    Labs:  not applicable        Allyn Acosta, APRN-CNP, APRN-CNS

## 2024-03-28 NOTE — ASSESSMENT & PLAN NOTE
Continue desvenlafaxine as noted above.   Continue lamotrigine 125 mg PO daily-  no refill needed today.    Continue individual therapy.     Advised of potentially fatal rash (i.e. Mcdowell-Luis Syndrome) in 1:10,000 individuals. Agrees to stop medication and seek medical attention immediately if rash or blistering of the mucosal surfaces develops; also discussed risk for benign drug-induced rash, and advised to cease medication and seek immediate medical attention to r/o SJS given its potential lethality. Further advised that the risk for this reaction increases if proper dose titration is not followed, and that after 2 days of non-adherence, re-titration is necessary.

## 2024-03-28 NOTE — ASSESSMENT & PLAN NOTE
Tolerating desvenlafaxine with good benefit for depression, but limited benefit for anxious sx. Discussed that current dose is typically maximum recommended daily dose, though in some cases dose can be titrated to 100 mg PO daily. Started buspirone ~1.5 weeks ago as adjunct for anxiety. Tolerating without side effects, will increase to BID dosing later this week.   In ~3 days, increase buspirone to 5 mg PO BID.   Continue desvenlafaxine ER 50 mg PO daily. -no refill needed today.    Continue alprazolam PRN 0.5 mg PO PRN for severe anxiety/panic (short supply- 10 tabs per fill). Taking sparingly- last fill late January 2023. Reasonable to continue while cross-titrating antidepressant medication.  Has not yet completed UDS- orders placed in January. She will complete this afternoon.   Continue engagement in individual therapy.     Reviewed OARRS, no discrepancies or concerns. Discussed risk of motor/cognitive impairment, sedation, dependence, tolerance, abuse, withdrawal sequelae, accidental overdose, life-threatening respiratory depression (bhavesh. in combination with alcohol and/or opioids), excess sedation in combination with other sedating medicines.  Noted that she was recently rx'd diazepam intravaginally for chronic pelvic pain.

## 2024-03-29 ENCOUNTER — LAB (OUTPATIENT)
Dept: LAB | Facility: LAB | Age: 44
End: 2024-03-29
Payer: COMMERCIAL

## 2024-03-29 ENCOUNTER — OFFICE VISIT (OUTPATIENT)
Dept: ORTHOPEDIC SURGERY | Facility: HOSPITAL | Age: 44
End: 2024-03-29
Payer: COMMERCIAL

## 2024-03-29 DIAGNOSIS — G56.02 CARPAL TUNNEL SYNDROME, LEFT: ICD-10-CM

## 2024-03-29 DIAGNOSIS — K58.0 IRRITABLE BOWEL SYNDROME WITH DIARRHEA: ICD-10-CM

## 2024-03-29 DIAGNOSIS — Z00.00 HEALTH CARE MAINTENANCE: ICD-10-CM

## 2024-03-29 DIAGNOSIS — R10.84 GENERALIZED ABDOMINAL PAIN: ICD-10-CM

## 2024-03-29 DIAGNOSIS — Z79.899 MEDICATION MANAGEMENT: ICD-10-CM

## 2024-03-29 DIAGNOSIS — R11.0 NAUSEA: ICD-10-CM

## 2024-03-29 DIAGNOSIS — G90.50 RSD (REFLEX SYMPATHETIC DYSTROPHY): ICD-10-CM

## 2024-03-29 DIAGNOSIS — R19.7 DIARRHEA, UNSPECIFIED TYPE: ICD-10-CM

## 2024-03-29 LAB
AMPHETAMINES UR QL SCN>1000 NG/ML: NEGATIVE
BARBITURATES UR QL SCN>300 NG/ML: NEGATIVE
BENZODIAZ UR QL SCN>300 NG/ML: POSITIVE
BZE UR QL SCN>300 NG/ML: NEGATIVE
CANNABINOIDS UR QL SCN>50 NG/ML: NEGATIVE
CRP SERPL-MCNC: 0.65 MG/DL
ERYTHROCYTE [DISTWIDTH] IN BLOOD BY AUTOMATED COUNT: 13.5 % (ref 11.5–14.5)
ERYTHROCYTE [SEDIMENTATION RATE] IN BLOOD BY WESTERGREN METHOD: 3 MM/H (ref 0–20)
FENTANYL+NORFENTANYL UR QL SCN: NEGATIVE
HCT VFR BLD AUTO: 51.1 % (ref 36–46)
HGB BLD-MCNC: 16.6 G/DL (ref 12–16)
MCH RBC QN AUTO: 29 PG (ref 26–34)
MCHC RBC AUTO-ENTMCNC: 32.5 G/DL (ref 32–36)
MCV RBC AUTO: 89 FL (ref 80–100)
METHADONE UR QL SCN>300 NG/ML: NEGATIVE
NRBC BLD-RTO: ABNORMAL /100{WBCS}
OPIATES UR QL SCN>300 NG/ML: NEGATIVE
OXYCODONE UR QL: NEGATIVE
PCP UR QL SCN>25 NG/ML: NEGATIVE
PLATELET # BLD AUTO: 218 X10*3/UL (ref 150–450)
RBC # BLD AUTO: 5.73 X10*6/UL (ref 4–5.2)
TSH SERPL DL<=0.05 MIU/L-ACNC: 1.8 MIU/L (ref 0.27–4.2)
TTG IGA SER IA-ACNC: <1 U/ML
WBC # BLD AUTO: 11.1 X10*3/UL (ref 4.4–11.3)

## 2024-03-29 PROCEDURE — 84443 ASSAY THYROID STIM HORMONE: CPT

## 2024-03-29 PROCEDURE — 3008F BODY MASS INDEX DOCD: CPT | Performed by: ORTHOPAEDIC SURGERY

## 2024-03-29 PROCEDURE — 36415 COLL VENOUS BLD VENIPUNCTURE: CPT

## 2024-03-29 PROCEDURE — 83516 IMMUNOASSAY NONANTIBODY: CPT

## 2024-03-29 PROCEDURE — 80346 BENZODIAZEPINES1-12: CPT

## 2024-03-29 PROCEDURE — 86140 C-REACTIVE PROTEIN: CPT

## 2024-03-29 PROCEDURE — 80307 DRUG TEST PRSMV CHEM ANLYZR: CPT

## 2024-03-29 PROCEDURE — 85027 COMPLETE CBC AUTOMATED: CPT

## 2024-03-29 PROCEDURE — 99024 POSTOP FOLLOW-UP VISIT: CPT | Performed by: ORTHOPAEDIC SURGERY

## 2024-03-29 PROCEDURE — 85652 RBC SED RATE AUTOMATED: CPT

## 2024-03-29 NOTE — LETTER
April 14, 2024     Jean Roman DO  [address]  [place]  [facility] [address]    Patient: Martina Blackwood   YOB: 1980   Date of Visit: 3/29/2024       Dear Dr. Jean Roman DO:    Thank you for referring Martina Blackwood to me for evaluation. Below are my notes for this consultation.  If you have questions, please do not hesitate to call me. I look forward to following your patient along with you.       Sincerely,     Torin Cole MD      CC: No Recipients  ______________________________________________________________________________________    CHIEF COMPLAINT         Left hand f/u    ASSESSMENT + PLAN    Generalized pain syndrome left hand after carpal tunnel release in November    I reviewed the nature of the pain syndrome such as RSD, and how this differs from the original carpal tunnel syndrome.  We had a long discussion about the options for management from here and agreed on a course of formal Occupational Therapy to work on desensitization and range of motion, as well as discussing adaptive measures.  I also recommend evaluation by Pain Management, as typically a lot of improvement can be gained with appropriate medical therapy.  There is no role for any additional surgery at this point.    Follow-up with me with any additional concerns.        HISTORY OF PRESENT ILLNESS       Patient returns today, 3 months after last visit with me and now almost 4 months from left carpal tunnel release complicated by wound dehiscence requiring wet-to-dry dressing changes to complete healing.  She now describes a burning pain through the hand and all digits and coming around dorsally.  She has pain with light brushing touch of the hand.  This does not particularly radiate.  It does not wake her from sleep.  No noted weakness, though she does describe some subjective stiffness of the fingers.  No similar problems on the right.  She has had a couple of stitches spit.    She does have history of a  chronic pelvic pain syndrome.      PHYSICAL EXAM       She remains well-developed, well-nourished female in no acute distress.  She appears her stated age and has a pleasant affect.  Incision on the left hand is well-healed.  Negative Durkan.  Negative Phalen.  She does have burning pain with brushing touch of the hand and the fingers including both radial and ulnar distribution.  Composite flexion lacks about 2 cm of the crease.  Extension is full.  Wrist extension 60 and flexion 65 today, limited by discomfort.  No popping or clicking.  No crepitus.  Sensation intact to light touch in all distributions.  Capillary refill less than 2 seconds.  Intact thenar motor function.      IMAGING / LABS / EMGs    None today      Electronically Signed      JOELLEN Cole MD      Orthopaedic Hand Surgery      [phone]

## 2024-03-29 NOTE — PROGRESS NOTES
CHIEF COMPLAINT         Left hand f/u    ASSESSMENT + PLAN    Generalized pain syndrome left hand after carpal tunnel release in November    I reviewed the nature of the pain syndrome such as RSD, and how this differs from the original carpal tunnel syndrome.  We had a long discussion about the options for management from here and agreed on a course of formal Occupational Therapy to work on desensitization and range of motion, as well as discussing adaptive measures.  I also recommend evaluation by Pain Management, as typically a lot of improvement can be gained with appropriate medical therapy.  There is no role for any additional surgery at this point.    Follow-up with me with any additional concerns.        HISTORY OF PRESENT ILLNESS       Patient returns today, 3 months after last visit with me and now almost 4 months from left carpal tunnel release complicated by wound dehiscence requiring wet-to-dry dressing changes to complete healing.  She now describes a burning pain through the hand and all digits and coming around dorsally.  She has pain with light brushing touch of the hand.  This does not particularly radiate.  It does not wake her from sleep.  No noted weakness, though she does describe some subjective stiffness of the fingers.  No similar problems on the right.  She has had a couple of stitches spit.    She does have history of a chronic pelvic pain syndrome.      PHYSICAL EXAM       She remains well-developed, well-nourished female in no acute distress.  She appears her stated age and has a pleasant affect.  Incision on the left hand is well-healed.  Negative Durkan.  Negative Phalen.  She does have burning pain with brushing touch of the hand and the fingers including both radial and ulnar distribution.  Composite flexion lacks about 2 cm of the crease.  Extension is full.  Wrist extension 60 and flexion 65 today, limited by discomfort.  No popping or clicking.  No crepitus.  Sensation intact to  light touch in all distributions.  Capillary refill less than 2 seconds.  Intact thenar motor function.      IMAGING / LABS / EMGs    None today      Electronically Signed      JOELLEN Cole MD      Orthopaedic Hand Surgery      [phone]

## 2024-04-03 LAB
1OH-MIDAZOLAM UR CFM-MCNC: <25 NG/ML
7AMINOCLONAZEPAM UR CFM-MCNC: <25 NG/ML
A-OH ALPRAZ UR CFM-MCNC: 196 NG/ML
ALPRAZ UR CFM-MCNC: 68 NG/ML
CHLORDIAZEP UR CFM-MCNC: <25 NG/ML
CLONAZEPAM UR CFM-MCNC: <25 NG/ML
DIAZEPAM UR CFM-MCNC: <25 NG/ML
LORAZEPAM UR CFM-MCNC: 416 NG/ML
MIDAZOLAM UR CFM-MCNC: <25 NG/ML
NORDIAZEPAM UR CFM-MCNC: 413 NG/ML
OXAZEPAM UR CFM-MCNC: 895 NG/ML
TEMAZEPAM UR CFM-MCNC: 528 NG/ML

## 2024-04-11 DIAGNOSIS — Z79.899 MEDICATION MANAGEMENT: ICD-10-CM

## 2024-04-11 DIAGNOSIS — Z00.00 HEALTH CARE MAINTENANCE: ICD-10-CM

## 2024-04-19 NOTE — OP NOTE
PREOP DIAGNOSIS: Right carpal tunnel syndrome.      POSTOP DIAGNOSIS: Right carpal tunnel syndrome.      PROCEDURE: The patient underwent right carpal tunnel release of the median  nerve at the wrist.      ASSISTANT: Varinder Cavanaugh.          ANESTHESIA:  Quir[place] block with sedation.      1ST ASSISTANT:      EBL: Less than 10 mL.      CONDITION: Stable.      COMPLICATIONS: None.      SPECIMEN: None.      DETAILS OF PROCEDURE: The patient was identified preoperatively by herself.  Risks and benefits of surgical intervention were once again reviewed with her.  Risks include anesthesia, infection, bleeding, injury to adjacent structures,  paralysis, paresthesias, dysfunction, deformity, scarring, recurrence,  nonresolution of symptoms, possible need for further surgical interventions  among others.  She understands her current clinical situation.  She understands  all of her treatment options.  She wished to proceed with surgical  intervention.  The right hand was appropriately identified and marked  preoperatively with a marking pen.  She received preoperative IV antibiotics.  SCDs were in place.  The appropriate preoperative safety checklist was  performed.  She was then taken to the operating room, placed in supine position  on the operating room table.  The Anesthesia Department then appropriately  performed [place] block anesthetic to the right upper extremity.  This involved  placement of dorsal right hand IV catheter, placement of proximal upper  extremity tourniquet, Esmarch exsanguination of right upper extremity,  inflation of the proximal upper extremity tourniquet, and instillation of the  anesthetic medication.  Once this was appropriately performed by the  Anesthesia Department, the right upper extremity was cleaned, prepped, and  draped in usual sterile fashion.  The entire procedure performed under loupe  magnification.  The appropriate mid central, longitudinal, palmar incision  site was marked with a  marking pen.  The right hand was appropriately  positioned on the hand table and retracted with the aluminum hand retracting  system.  The appropriate time-out procedure was performed.  The incision was  then made with a scalpel.  Subcutaneous tissue was dissected with tenotomy  scissors.  Excellent hemostasis was maintained with electrocautery.  A small  Heiss retractor and Ragnell retractors were employed to retract the skin  subcutaneous tissue.  Further dissection with tenotomy scissors was performed.  The palmar aponeurosis was identified.  This was then incised mid centrally  and longitudinally with the scalpel.  Further dissection with tenotomy  scissors was performed.  The transverse carpal ligament was identified.  This  was then incised mid centrally and longitudinally with the scalpel.  The  median nerve was identified directly underneath.  The mid central,  longitudinal, transverse carpal ligament incision was then extended distally  in a longitudinal fashion under direct vision with loupe magnification  employing the scalpel and tenotomy scissors.  Complete release from mid  central to distal was accomplished.  This was confirmed with the hemostat.  The mid central, longitudinal, transverse carpal ligament incision was then  extended in a longitudinal fashion proximally under direct vision with loupe  magnification employing the scalpel and tenotomy scissors.  Complete release  of the transverse carpal ligament was accomplished.  This was once again  confirmed with the hemostat.  The median nerve was further identified.  It was  briefly, gently, and meticulously dissected along its lateral margins.  No  other compressive pathologic entities were identified in the carpal tunnel.  The operative site area was then copiously irrigated with saline solution.  The operative site was then injected in local circumferential fashion with  approximately 4 mL of 2% plain lidocaine solution for local anesthetic  effect.  The proximal upper extremity tourniquet was then released.  Total tourniquet  time was 22 minutes.  The entire hand and all 5 digits became pink, warm, and  had a capillary refill of 1 to 2 seconds upon tourniquet release.  Excellent  hemostasis was maintained with electrocautery.  Further copious irrigation  with saline solution was performed.  Excellent hemostasis was noted.  The  wound edges were then reapproximated with 1 intermittent horizontal mattress  suture 4-0 nylon and a few simple interrupted sutures of 4-0 nylon.  The  operative site area was further cleaned and dried.  The operative site was  then further injected in local circumferential fashion with approximately 5 mL  of 0.25% plain Marcaine solution for prolonged postoperative pain relief.  The  right hand was further cleaned and dried.  Bacitracin, Xeroform, and  appropriate bulky protective sterile dressing was then fashioned and applied.  Distal tips of all 5 digits remained pink, warm, and had a capillary refill of  1 to 2 seconds after dressing application.  She tolerated the procedure very  well.  She awoke from anesthesia without difficulty and was transferred to the  recovery room in stable condition.       Electronic Signatures:  Fei Kim)  (Signed on 12-Jun-2023 13:14)   Authored    Last Updated: 12-Jun-2023 13:14 by Fei Kim)

## 2024-04-29 ENCOUNTER — LAB (OUTPATIENT)
Dept: LAB | Facility: LAB | Age: 44
End: 2024-04-29
Payer: COMMERCIAL

## 2024-04-29 DIAGNOSIS — Z79.899 MEDICATION MANAGEMENT: ICD-10-CM

## 2024-04-29 DIAGNOSIS — Z00.00 HEALTH CARE MAINTENANCE: ICD-10-CM

## 2024-04-29 LAB
AMPHETAMINES UR QL SCN: NORMAL
BARBITURATES UR QL SCN: NORMAL
BZE UR QL SCN: NORMAL
CANNABINOIDS UR QL SCN: NORMAL
CREAT UR-MCNC: 212.7 MG/DL (ref 20–320)
PCP UR QL SCN: NORMAL

## 2024-04-29 PROCEDURE — 80358 DRUG SCREENING METHADONE: CPT

## 2024-04-29 PROCEDURE — 80373 DRUG SCREENING TRAMADOL: CPT

## 2024-04-29 PROCEDURE — 82570 ASSAY OF URINE CREATININE: CPT

## 2024-04-29 PROCEDURE — 80307 DRUG TEST PRSMV CHEM ANLYZR: CPT

## 2024-04-29 PROCEDURE — 80361 OPIATES 1 OR MORE: CPT

## 2024-04-29 PROCEDURE — 80346 BENZODIAZEPINES1-12: CPT

## 2024-04-29 PROCEDURE — 80368 SEDATIVE HYPNOTICS: CPT

## 2024-04-29 PROCEDURE — 80354 DRUG SCREENING FENTANYL: CPT

## 2024-04-29 PROCEDURE — 80365 DRUG SCREENING OXYCODONE: CPT

## 2024-05-01 LAB
1OH-MIDAZOLAM UR CFM-MCNC: <25 NG/ML
6MAM UR CFM-MCNC: <25 NG/ML
7AMINOCLONAZEPAM UR CFM-MCNC: <25 NG/ML
A-OH ALPRAZ UR CFM-MCNC: 209 NG/ML
ALPRAZ UR CFM-MCNC: 93 NG/ML
CHLORDIAZEP UR CFM-MCNC: <25 NG/ML
CLONAZEPAM UR CFM-MCNC: <25 NG/ML
CODEINE UR CFM-MCNC: <50 NG/ML
DIAZEPAM UR CFM-MCNC: <25 NG/ML
EDDP UR CFM-MCNC: <25 NG/ML
FENTANYL UR CFM-MCNC: <2.5 NG/ML
HYDROCODONE CTO UR CFM-MCNC: <25 NG/ML
HYDROMORPHONE UR CFM-MCNC: <25 NG/ML
LORAZEPAM UR CFM-MCNC: <25 NG/ML
METHADONE UR CFM-MCNC: <25 NG/ML
MIDAZOLAM UR CFM-MCNC: <25 NG/ML
MORPHINE UR CFM-MCNC: <50 NG/ML
NORDIAZEPAM UR CFM-MCNC: >1000 NG/ML
NORFENTANYL UR CFM-MCNC: <2.5 NG/ML
NORHYDROCODONE UR CFM-MCNC: <25 NG/ML
NOROXYCODONE UR CFM-MCNC: <25 NG/ML
NORTRAMADOL UR-MCNC: <50 NG/ML
OXAZEPAM UR CFM-MCNC: >1000 NG/ML
OXYCODONE UR CFM-MCNC: <25 NG/ML
OXYMORPHONE UR CFM-MCNC: <25 NG/ML
TEMAZEPAM UR CFM-MCNC: >1000 NG/ML
TRAMADOL UR CFM-MCNC: <50 NG/ML
ZOLPIDEM UR CFM-MCNC: <25 NG/ML
ZOLPIDEM UR-MCNC: <25 NG/ML

## 2024-05-08 ENCOUNTER — OFFICE VISIT (OUTPATIENT)
Dept: PRIMARY CARE | Facility: CLINIC | Age: 44
End: 2024-05-08
Payer: COMMERCIAL

## 2024-05-08 VITALS
HEART RATE: 105 BPM | DIASTOLIC BLOOD PRESSURE: 84 MMHG | HEIGHT: 65 IN | SYSTOLIC BLOOD PRESSURE: 138 MMHG | BODY MASS INDEX: 28.16 KG/M2 | OXYGEN SATURATION: 96 % | WEIGHT: 169 LBS

## 2024-05-08 DIAGNOSIS — G62.9 NEUROPATHY: ICD-10-CM

## 2024-05-08 DIAGNOSIS — R51.9 CHRONIC NONINTRACTABLE HEADACHE, UNSPECIFIED HEADACHE TYPE: ICD-10-CM

## 2024-05-08 DIAGNOSIS — E55.9 VITAMIN D DEFICIENCY: ICD-10-CM

## 2024-05-08 DIAGNOSIS — Z12.31 BREAST CANCER SCREENING BY MAMMOGRAM: Primary | ICD-10-CM

## 2024-05-08 DIAGNOSIS — G89.29 CHRONIC NONINTRACTABLE HEADACHE, UNSPECIFIED HEADACHE TYPE: ICD-10-CM

## 2024-05-08 PROCEDURE — 99214 OFFICE O/P EST MOD 30 MIN: CPT | Performed by: FAMILY MEDICINE

## 2024-05-08 PROCEDURE — 3008F BODY MASS INDEX DOCD: CPT | Performed by: FAMILY MEDICINE

## 2024-05-08 RX ORDER — DIAZEPAM 5 MG/1
5 TABLET ORAL
COMMUNITY
Start: 2024-04-15 | End: 2024-05-15

## 2024-05-08 RX ORDER — AMITRIPTYLINE HYDROCHLORIDE 25 MG/1
25 TABLET, FILM COATED ORAL NIGHTLY
Qty: 90 TABLET | Refills: 2 | Status: SHIPPED | OUTPATIENT
Start: 2024-05-08

## 2024-05-08 RX ORDER — ERGOCALCIFEROL 1.25 MG/1
1 CAPSULE ORAL
Qty: 8 CAPSULE | Refills: 3 | Status: SHIPPED | OUTPATIENT
Start: 2024-05-12

## 2024-05-08 RX ORDER — GABAPENTIN 400 MG/1
1200 CAPSULE ORAL NIGHTLY
Qty: 90 CAPSULE | Refills: 5 | Status: SHIPPED | OUTPATIENT
Start: 2024-05-08

## 2024-05-08 SDOH — ECONOMIC STABILITY: TRANSPORTATION INSECURITY
IN THE PAST 12 MONTHS, HAS LACK OF TRANSPORTATION KEPT YOU FROM MEETINGS, WORK, OR FROM GETTING THINGS NEEDED FOR DAILY LIVING?: NO

## 2024-05-08 SDOH — HEALTH STABILITY: PHYSICAL HEALTH: ON AVERAGE, HOW MANY DAYS PER WEEK DO YOU ENGAGE IN MODERATE TO STRENUOUS EXERCISE (LIKE A BRISK WALK)?: 7 DAYS

## 2024-05-08 SDOH — ECONOMIC STABILITY: TRANSPORTATION INSECURITY
IN THE PAST 12 MONTHS, HAS THE LACK OF TRANSPORTATION KEPT YOU FROM MEDICAL APPOINTMENTS OR FROM GETTING MEDICATIONS?: NO

## 2024-05-08 SDOH — HEALTH STABILITY: PHYSICAL HEALTH: ON AVERAGE, HOW MANY MINUTES DO YOU ENGAGE IN EXERCISE AT THIS LEVEL?: 40 MIN

## 2024-05-08 ASSESSMENT — PATIENT HEALTH QUESTIONNAIRE - PHQ9
7. TROUBLE CONCENTRATING ON THINGS, SUCH AS READING THE NEWSPAPER OR WATCHING TELEVISION: NEARLY EVERY DAY
SUM OF ALL RESPONSES TO PHQ9 QUESTIONS 1 & 2: 6
9. THOUGHTS THAT YOU WOULD BE BETTER OFF DEAD, OR OF HURTING YOURSELF: NOT AT ALL
8. MOVING OR SPEAKING SO SLOWLY THAT OTHER PEOPLE COULD HAVE NOTICED. OR THE OPPOSITE, BEING SO FIGETY OR RESTLESS THAT YOU HAVE BEEN MOVING AROUND A LOT MORE THAN USUAL: NEARLY EVERY DAY
3. TROUBLE FALLING OR STAYING ASLEEP: NEARLY EVERY DAY
10. IF YOU CHECKED OFF ANY PROBLEMS, HOW DIFFICULT HAVE THESE PROBLEMS MADE IT FOR YOU TO DO YOUR WORK, TAKE CARE OF THINGS AT HOME, OR GET ALONG WITH OTHER PEOPLE: EXTREMELY DIFFICULT
5. POOR APPETITE OR OVEREATING: NEARLY EVERY DAY
1. LITTLE INTEREST OR PLEASURE IN DOING THINGS: NEARLY EVERY DAY
SUM OF ALL RESPONSES TO PHQ QUESTIONS 1-9: 24
6. FEELING BAD ABOUT YOURSELF - OR THAT YOU ARE A FAILURE OR HAVE LET YOURSELF OR YOUR FAMILY DOWN: NEARLY EVERY DAY
2. FEELING DOWN, DEPRESSED OR HOPELESS: NEARLY EVERY DAY
4. FEELING TIRED OR HAVING LITTLE ENERGY: NEARLY EVERY DAY

## 2024-05-08 ASSESSMENT — SOCIAL DETERMINANTS OF HEALTH (SDOH)
IN A TYPICAL WEEK, HOW MANY TIMES DO YOU TALK ON THE PHONE WITH FAMILY, FRIENDS, OR NEIGHBORS?: TWICE A WEEK
HOW OFTEN DO YOU GET TOGETHER WITH FRIENDS OR RELATIVES?: TWICE A WEEK
HOW OFTEN DO YOU ATTENT MEETINGS OF THE CLUB OR ORGANIZATION YOU BELONG TO?: NEVER
HOW OFTEN DO YOU ATTEND CHURCH OR RELIGIOUS SERVICES?: 1 TO 4 TIMES PER YEAR
HOW HARD IS IT FOR YOU TO PAY FOR THE VERY BASICS LIKE FOOD, HOUSING, MEDICAL CARE, AND HEATING?: NOT HARD AT ALL
DO YOU BELONG TO ANY CLUBS OR ORGANIZATIONS SUCH AS CHURCH GROUPS UNIONS, FRATERNAL OR ATHLETIC GROUPS, OR SCHOOL GROUPS?: NO

## 2024-05-08 ASSESSMENT — ENCOUNTER SYMPTOMS
COUGH: 0
NUMBNESS: 1
FEVER: 0

## 2024-05-08 NOTE — PROGRESS NOTES
"Subjective   Patient ID: Jennifer Blackwood \"Janeth" is a 43 y.o. female who presents for Annual Exam and Neuropathy.    HPI   She is here today for 6-month follow-up  Past medical history is significant for chronic bilateral lower extremity neuropathy.  This has been present since she was a teenager.  Previous workup including a lower extremity EMG done in 2018 showed mild bilateral L5 radiculopathy.  She has been taking gabapentin for several years, and currently takes 1200 mg at bedtime.  She feels that this medication helps.  Her pain tends to be worse at night.  Her pain will improve from a 9 out of 10 to a 5 out of 10 with medication.  Her neuropathy does not bother her as much during the day.  Neuropathy extends up to her mid thighs.  No significant side effects with medications  She has had headaches which have been present for almost 1 year.  She previously had a brain MRI done 10/2023 which was unremarkable other than a single focus of nonspecific hyperintensity located within the left centrum semiovale, possibly reflecting gliosis and of unclear significance.  We had referred her to neurology, however she was not able to see them due to a long wait time.  She is currently taking amitriptyline 20 mg nightly which seems to help, but has been less effective recently.  Headaches are mainly located bilateral occiput, but will occasionally involve her forehead also.  Headaches are sharp and constant and present all the time  Needs refill on vitamin D.  She is currently taking vitamin D 50,000 IU weekly due to history of vitamin D deficiency.  Her last vitamin D level checked approximately 1 year ago was normal range at 31      Review of Systems   Constitutional:  Negative for fever.   Respiratory:  Negative for cough.    Cardiovascular:  Negative for leg swelling.   Neurological:  Positive for numbness.       Objective   /84   Pulse 105   Ht 1.651 m (5' 5\")   Wt 76.7 kg (169 lb)   SpO2 96%   BMI " 28.12 kg/m²     Physical Exam  Vitals reviewed.   Constitutional:       General: She is not in acute distress.     Appearance: Normal appearance. She is well-developed.   HENT:      Head: Normocephalic.   Eyes:      Extraocular Movements: Extraocular movements intact.      Conjunctiva/sclera: Conjunctivae normal.      Pupils: Pupils are equal, round, and reactive to light.   Cardiovascular:      Rate and Rhythm: Normal rate and regular rhythm.      Heart sounds: Normal heart sounds.   Pulmonary:      Effort: Pulmonary effort is normal.      Breath sounds: Normal breath sounds.   Musculoskeletal:      Right lower leg: No edema.      Left lower leg: No edema.   Skin:     Findings: No rash.   Neurological:      Mental Status: She is alert.      Sensory: No sensory deficit (Lower extremity sensation intact).      Motor: No weakness.      Deep Tendon Reflexes: Reflexes normal.   Psychiatric:         Mood and Affect: Mood normal.         Behavior: Behavior normal.         Assessment/Plan   Problem List Items Addressed This Visit          Medium    Neuropathy    Relevant Medications    gabapentin (Neurontin) 400 mg capsule    Vitamin D deficiency    Relevant Medications    ergocalciferol (Vitamin D-2) 1.25 MG (11236 UT) capsule (Start on 5/12/2024)     Other Visit Diagnoses       Breast cancer screening by mammogram    -  Primary    Relevant Orders    BI mammo bilateral screening tomosynthesis    New onset headache        Relevant Medications    amitriptyline (Elavil) 25 mg tablet    Other Relevant Orders    Referral to Neurology        Chronic lower extremity neuropathy: This has been stable.  We will continue gabapentin 1200 mg nightly and follow-up in 6 months for recheck.  She is updated on CSA which was signed 5/19/2023  An OARRS report was printed and I have personally reviewed the results.  The report will be scanned into the health record.  I have considered the risk of abuse, dependance, addiction, and diversion.   I believe it is clinically appropriate for this patient to continue taking this medication.  Chronic headache:  She noted some improvement with amitriptyline, but feels that it has been less effective recently.We will increase amitriptyline from 20 to 25 mg nightly and I gave her a referral to establish with neurology  Continue vitamin D weekly for vitamin D deficiency.  We will plan on ordering full labs including vitamin D levels at her follow-up in 6 months

## 2024-05-09 ENCOUNTER — APPOINTMENT (OUTPATIENT)
Dept: BEHAVIORAL HEALTH | Facility: CLINIC | Age: 44
End: 2024-05-09
Payer: COMMERCIAL

## 2024-05-09 NOTE — PROGRESS NOTES
"Outpatient Psychiatry      Subjective   Jennifer Blackwood \"Martina\", is a 43 y.o. female,  seen in follow-up.      Assessment/Plan   Problem List Items Addressed This Visit             ICD-10-CM    Generalized anxiety disorder F41.1     Tolerated cross-titration of duloxetine in favor of desvenlafaxine. Has been at 50 mg dose for ~3 weeks, too soon to fully assess benefit, but limited response so far. Continue at current dose for now.   Continue desvenlafaxine ER 50 mg PO daily. -refill sent to pharmacy today.   Continue alprazolam PRN 0.5 mg PO PRN for severe anxiety/panic (short supply- 10 tabs per fill). Taking sparingly. Reasonable to continue while cross-titrating antidepressant medication.   Will check UDS given that refill will be provided today.   Continue engagement in individual therapy.     Reviewed OARRS, no discrepancies or concerns. Discussed risk of motor/cognitive impairment, sedation, dependence, tolerance, abuse, withdrawal sequelae, accidental overdose, life-threatening respiratory depression (bhavesh. in combination with alcohol and/or opioids), excess sedation in combination with other sedating medicines.           Relevant Medications    ALPRAZolam (Xanax) 0.5 mg tablet    desvenlafaxine 50 mg 24 hr tablet    traZODone (Desyrel) 50 mg tablet    Other Relevant Orders    Follow Up In Psychiatry    PTSD (post-traumatic stress disorder) F43.10     Continue medications and individual therapy as noted above.          Relevant Medications    ALPRAZolam (Xanax) 0.5 mg tablet    desvenlafaxine 50 mg 24 hr tablet    lamoTRIgine (LaMICtal) 100 mg tablet    lamoTRIgine (LaMICtal) 25 mg tablet    traZODone (Desyrel) 50 mg tablet    Other Relevant Orders    Follow Up In Psychiatry    Insomnia G47.00     Ongoing difficulty with sleep onset and duration. Could consider further titration of trazodone. Will defer while evaluating benefit from recent change in antidepressant.   Continue trazodone  mg PO at " bedtime.-refill sent to pharmacy today.          Moderate episode of recurrent major depressive disorder (CMS/[facility]) F33.1     Continue desvenlafaxine as noted above. Continue individual therapy.   Continue lamotrigine 125 mg PO daily- refill sent to pharmacy today.     Advised of potentially fatal rash (i.e. Mcdowell-Luis Syndrome) in 1:10,000 individuals. Agrees to stop medication and seek medical attention immediately if rash or blistering of the mucosal surfaces develops; also discussed risk for benign drug-induced rash, and advised to cease medication and seek immediate medical attention to r/o SJS given its potential lethality. Further advised that the risk for this reaction increases if proper dose titration is not followed, and that after 2 days of non-adherence, re-titration is necessary.          Relevant Medications    desvenlafaxine 50 mg 24 hr tablet    lamoTRIgine (LaMICtal) 100 mg tablet    lamoTRIgine (LaMICtal) 25 mg tablet    traZODone (Desyrel) 50 mg tablet    Other Relevant Orders    Follow Up In Psychiatry     Other Visit Diagnoses         Codes    Medication management     Z79.899    Relevant Orders    Drug Screen, Urine With Reflex to Confirmation    Health care maintenance     Z00.00    Relevant Orders    Drug Screen, Urine With Reflex to Confirmation            Follow-up in 4-6 weeks.     Risk Assessment:  Risk Assessment: Jennifer Blackwood is currently a low acute risk of suicide and self-harm due to no past suicide attempt(s) and not currently endorsing thoughts of suicide. Jennifer Blackwood is currently a low acute risk of violence and harm to others due to no past history of violence and not currently threatening others.  Suicidal Risk Factors: , history of trauma/abuse, chronic medical illness, current psychiatric illness, and panic attacks  Violence Risk Factors: none  Protective Factors: strong coping skills, sense of responsibility towards family, social  "support/connectedness, child related concerns/living with child <18 years, positive family relationships, hopefulness/future orientation, and marriage/partnership     Provided with crisis/emergency resources, including the [facility] Crisis Hotline 1-345.234.1890, Crisis Text Line (text 8ELOG hh 117140) and the National Suicide Prevention Lifeline hotline 1-111.937.4040. Agrees to call 988 or go to the nearest emergency department if she feels unsafe, or has suicidal thinking with a plan or intent.     Reason for Visit:  Follow-up for medication management.     HPI:  Since her last visit,     Aunt  a few days before [place] unexpectedly.   They were very close.   Estranged son attended - difficult to see him. Hadn't seen him in ~2 years.     Noticed positive change since coming off of duloxetine.   Was worried about having side effects, but has tolerated well.   Still some grogginess in the morning, but much better than with duloxetine.   \"Not as hernandez,\" about a week after coming off of duloxetine. Less irritability, less mood fluctuation.   Sleep is not great- hasn't worsened, but no improvement.   Lies down around 8:30-9 PM, takes ~1 hr to get to sleep. Wakes after a few hours, then is up and down for the rest of the night. Probably ~4- 4.5 hrs/night. About average for her, for at least the past year.   Still feels depressed, but slightly better.   Has taken alprazolam PRN ~5 times over the past month or so.   Anxiety has been about the same so far, despite recent change in antidepressant.   Still meeting regularly meeting with individual therapist, and finds that helpful.     Denies suicidal ideation or a passive death wish.     Recently experiencing more GI sx, which have been disrupting sleep further.   Sees GI doctor next month for follow-up. Also recently tx with antibiotics for infection a/w surgical wound.       Current Psychiatric Medications:  Desvenlafaxine 50 mg PO daily  Continue " "lamotrigine 125 mg PO daily  Trazodone  mg PO at bedtime PRN for sleep  Continue alprazolam 0.5 mg PO PRN for severe anxiety      Record Review: brief     Medical Review Of Systems:  Pertinent items are noted in HPI.    Psychiatric Review Of Systems:  As noted in HPI.        Objective   Mental Status Exam  General Appearance: Well groomed, appropriate eye contact  Attitude/Behavior: Cooperative  Motor: No psychomotor agitation or retardation, no tremor or other abnormal movements  Speech: Normal rate, volume, prosody  Gait/Station: Other:(comment) (not observed- virtual)  Mood: \"A little better.\"  Affect: Constricted, Congruent with mood and topic of conversation  Thought Process: Linear, goal directed  Thought Associations: No loosening of associations  Thought Content: Normal, Other: (comment) (fewer depressive themes, ongoing anxious themes.)  Perception: No perceptual abnormalities noted  Sensorium: Alert and oriented to person, place, time and situation  Insight: Intact  Judgement: Intact  Cognition: Cognitively intact to conversational testing with respect to attention, orientation, fund of knowledge, recent and remote memory, and language    Vitals:  There were no vitals filed for this visit.    Labs:  not applicable        Allyn Acosta, APRN-CNP, APRN-CNS  " 96

## 2024-05-18 ENCOUNTER — HOSPITAL ENCOUNTER (OUTPATIENT)
Dept: RADIOLOGY | Facility: HOSPITAL | Age: 44
Discharge: HOME | End: 2024-05-18
Payer: COMMERCIAL

## 2024-05-18 VITALS — WEIGHT: 169 LBS | BODY MASS INDEX: 28.16 KG/M2 | HEIGHT: 65 IN

## 2024-05-18 DIAGNOSIS — Z12.31 BREAST CANCER SCREENING BY MAMMOGRAM: ICD-10-CM

## 2024-05-18 PROCEDURE — 77063 BREAST TOMOSYNTHESIS BI: CPT | Performed by: RADIOLOGY

## 2024-05-18 PROCEDURE — 77067 SCR MAMMO BI INCL CAD: CPT | Performed by: RADIOLOGY

## 2024-05-18 PROCEDURE — 77067 SCR MAMMO BI INCL CAD: CPT

## 2024-06-18 DIAGNOSIS — F41.1 GENERALIZED ANXIETY DISORDER: ICD-10-CM

## 2024-06-18 DIAGNOSIS — F43.10 PTSD (POST-TRAUMATIC STRESS DISORDER): ICD-10-CM

## 2024-06-18 DIAGNOSIS — F33.1 MODERATE EPISODE OF RECURRENT MAJOR DEPRESSIVE DISORDER (MULTI): ICD-10-CM

## 2024-06-18 RX ORDER — TRAZODONE HYDROCHLORIDE 50 MG/1
50-100 TABLET ORAL NIGHTLY
Qty: 60 TABLET | Refills: 2 | Status: SHIPPED | OUTPATIENT
Start: 2024-06-18 | End: 2024-09-16

## 2024-06-21 RX ORDER — PANTOPRAZOLE SODIUM 40 MG/1
TABLET, DELAYED RELEASE ORAL
COMMUNITY
Start: 2022-09-15

## 2024-06-21 RX ORDER — ESTRADIOL 0.05 MG/D
1 PATCH TRANSDERMAL WEEKLY
COMMUNITY
Start: 2024-06-13

## 2024-06-21 RX ORDER — LIDOCAINE 50 MG/G
1 PATCH TOPICAL
COMMUNITY
Start: 2024-06-13

## 2024-06-21 RX ORDER — BACLOFEN 10 MG/1
10 TABLET ORAL EVERY 8 HOURS PRN
COMMUNITY
Start: 2024-05-17

## 2024-06-27 DIAGNOSIS — F41.1 GENERALIZED ANXIETY DISORDER: ICD-10-CM

## 2024-07-01 RX ORDER — BUSPIRONE HYDROCHLORIDE 5 MG/1
5 TABLET ORAL 2 TIMES DAILY
Qty: 60 TABLET | Refills: 0 | Status: SHIPPED | OUTPATIENT
Start: 2024-07-01 | End: 2024-07-31

## 2024-07-02 DIAGNOSIS — F43.10 PTSD (POST-TRAUMATIC STRESS DISORDER): ICD-10-CM

## 2024-07-02 DIAGNOSIS — F41.1 GENERALIZED ANXIETY DISORDER: ICD-10-CM

## 2024-07-02 RX ORDER — ALPRAZOLAM 0.5 MG/1
0.5 TABLET ORAL DAILY PRN
Qty: 10 TABLET | Refills: 1 | Status: SHIPPED | OUTPATIENT
Start: 2024-07-02

## 2024-07-16 PROBLEM — R11.2 NAUSEA, VOMITING, AND DIARRHEA: Status: ACTIVE | Noted: 2024-07-16

## 2024-07-16 PROBLEM — H60.509 ACUTE OTITIS EXTERNA: Status: ACTIVE | Noted: 2024-07-16

## 2024-07-16 PROBLEM — Z88.1 ALLERGY STATUS TO OTHER ANTIBIOTIC AGENTS: Status: ACTIVE | Noted: 2023-06-12

## 2024-07-16 PROBLEM — M25.569 KNEE PAIN: Status: ACTIVE | Noted: 2024-07-16

## 2024-07-16 PROBLEM — U07.1 DISEASE DUE TO SEVERE ACUTE RESPIRATORY SYNDROME CORONAVIRUS 2 (SARS-COV-2): Status: ACTIVE | Noted: 2024-07-16

## 2024-07-16 PROBLEM — H91.90 UNSPECIFIED HEARING LOSS, UNSPECIFIED EAR: Status: ACTIVE | Noted: 2018-01-19
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
## 2024-07-16 PROBLEM — S16.1XXA STRAIN OF NECK MUSCLE: Status: ACTIVE | Noted: 2024-07-16

## 2024-07-16 PROBLEM — R11.2 NAUSEA AND VOMITING: Status: ACTIVE | Noted: 2024-07-16

## 2024-07-16 PROBLEM — J20.9 ACUTE BRONCHITIS: Status: ACTIVE | Noted: 2024-07-16

## 2024-07-16 PROBLEM — E66.9 OBESITY WITH BODY MASS INDEX 30 OR GREATER: Status: ACTIVE | Noted: 2024-07-16

## 2024-07-16 PROBLEM — S92.009A FRACTURE OF CALCANEUS: Status: ACTIVE | Noted: 2024-07-16

## 2024-07-16 PROBLEM — M79.609 PAIN OF MULTIPLE EXTREMITIES: Status: ACTIVE | Noted: 2024-07-16

## 2024-07-16 PROBLEM — M79.673 PAIN OF FOOT: Status: ACTIVE | Noted: 2024-07-16

## 2024-07-16 PROBLEM — G89.18 ACUTE POSTOPERATIVE PAIN: Status: ACTIVE | Noted: 2024-07-16

## 2024-07-16 PROBLEM — R19.7 NAUSEA, VOMITING, AND DIARRHEA: Status: ACTIVE | Noted: 2024-07-16

## 2024-07-16 PROBLEM — B37.2 INTERTRIGINOUS CANDIDIASIS: Status: ACTIVE | Noted: 2024-07-16

## 2024-07-16 PROBLEM — S01.339A COMPLICATION OF EAR PIERCING: Status: ACTIVE | Noted: 2024-07-16

## 2024-07-16 PROBLEM — T17.1XXA FOREIGN BODY IN NOSE: Status: ACTIVE | Noted: 2024-07-16

## 2024-07-16 PROBLEM — R74.8 ELEVATED LIVER ENZYMES: Status: ACTIVE | Noted: 2024-07-16

## 2024-07-16 PROBLEM — J86.9 ABSCESS OF THORAX (MULTI): Status: ACTIVE | Noted: 2024-07-16

## 2024-07-16 PROBLEM — C50.919 MALIGNANT NEOPLASM OF FEMALE BREAST (MULTI): Status: ACTIVE | Noted: 2024-07-16

## 2024-07-16 PROBLEM — R60.0 EDEMA OF LOWER EXTREMITY: Status: ACTIVE | Noted: 2024-07-16

## 2024-07-16 PROBLEM — E66.9 OBESITY: Status: ACTIVE | Noted: 2023-06-05

## 2024-07-16 PROBLEM — K76.0 STEATOSIS OF LIVER: Status: ACTIVE | Noted: 2024-07-16

## 2024-07-16 PROBLEM — F41.8 MIXED ANXIETY DEPRESSIVE DISORDER: Status: ACTIVE | Noted: 2023-04-11

## 2024-07-16 PROBLEM — F31.81 BIPOLAR II DISORDER (MULTI): Status: ACTIVE | Noted: 2024-07-16

## 2024-07-16 PROBLEM — Z86.16 PERSONAL HISTORY OF COVID-19: Status: ACTIVE | Noted: 2023-06-05

## 2024-07-16 PROBLEM — N64.9 LESION OF BREAST: Status: ACTIVE | Noted: 2018-04-10

## 2024-07-16 PROBLEM — Z98.51 TUBAL LIGATION STATUS: Status: ACTIVE | Noted: 2023-03-28

## 2024-07-16 PROBLEM — M41.9 SCOLIOSIS, UNSPECIFIED: Status: ACTIVE | Noted: 2023-06-05

## 2024-07-16 PROBLEM — S69.90XA THUMB INJURY: Status: ACTIVE | Noted: 2024-07-16

## 2024-07-16 PROBLEM — N76.0 ACUTE VAGINITIS: Status: ACTIVE | Noted: 2024-07-16

## 2024-07-16 PROBLEM — J06.9 UPPER RESPIRATORY TRACT INFECTION: Status: ACTIVE | Noted: 2024-07-16

## 2024-07-16 PROBLEM — F17.200 TOBACCO USE DISORDER: Status: ACTIVE | Noted: 2024-07-16

## 2024-07-16 PROBLEM — R68.89 GENERAL SYMPTOM: Status: ACTIVE | Noted: 2024-07-16

## 2024-07-16 PROBLEM — L28.2 PRURITIC RASH: Status: ACTIVE | Noted: 2024-07-16

## 2024-07-16 PROBLEM — N32.9 DISORDER OF BLADDER: Status: ACTIVE | Noted: 2024-07-16

## 2024-07-16 PROBLEM — K13.0 ANGULAR CHEILITIS: Status: ACTIVE | Noted: 2024-07-16

## 2024-07-16 PROBLEM — R19.7 DIARRHEA: Status: ACTIVE | Noted: 2024-07-16

## 2024-07-16 PROBLEM — R27.8 MUSCULAR INCOORDINATION: Status: ACTIVE | Noted: 2024-03-04

## 2024-07-16 PROBLEM — M25.579 ANKLE PAIN: Status: ACTIVE | Noted: 2024-07-16

## 2024-07-16 PROBLEM — R11.10 VOMITING: Status: ACTIVE | Noted: 2024-07-16

## 2024-07-17 DIAGNOSIS — F41.1 GENERALIZED ANXIETY DISORDER: ICD-10-CM

## 2024-07-17 DIAGNOSIS — F33.1 MODERATE EPISODE OF RECURRENT MAJOR DEPRESSIVE DISORDER (MULTI): ICD-10-CM

## 2024-07-17 DIAGNOSIS — F43.10 PTSD (POST-TRAUMATIC STRESS DISORDER): ICD-10-CM

## 2024-07-17 RX ORDER — DESVENLAFAXINE SUCCINATE 50 MG/1
50 TABLET, EXTENDED RELEASE ORAL DAILY
Qty: 30 TABLET | Refills: 2 | Status: SHIPPED | OUTPATIENT
Start: 2024-07-17

## 2024-07-18 ENCOUNTER — APPOINTMENT (OUTPATIENT)
Dept: BEHAVIORAL HEALTH | Facility: CLINIC | Age: 44
End: 2024-07-18
Payer: COMMERCIAL

## 2024-07-18 DIAGNOSIS — F41.1 GENERALIZED ANXIETY DISORDER: ICD-10-CM

## 2024-07-18 DIAGNOSIS — F33.1 MODERATE EPISODE OF RECURRENT MAJOR DEPRESSIVE DISORDER (MULTI): ICD-10-CM

## 2024-07-18 DIAGNOSIS — F43.10 PTSD (POST-TRAUMATIC STRESS DISORDER): ICD-10-CM

## 2024-07-18 PROCEDURE — 99214 OFFICE O/P EST MOD 30 MIN: CPT | Performed by: CLINICAL NURSE SPECIALIST

## 2024-07-18 RX ORDER — BUSPIRONE HYDROCHLORIDE 5 MG/1
5 TABLET ORAL 2 TIMES DAILY
Qty: 60 TABLET | Refills: 2 | Status: SHIPPED | OUTPATIENT
Start: 2024-07-18 | End: 2024-10-16

## 2024-07-18 RX ORDER — LAMOTRIGINE 100 MG/1
100 TABLET ORAL DAILY
Qty: 30 TABLET | Refills: 2 | Status: SHIPPED | OUTPATIENT
Start: 2024-07-18 | End: 2024-10-16

## 2024-07-18 RX ORDER — LAMOTRIGINE 25 MG/1
25 TABLET ORAL DAILY
Qty: 30 TABLET | Refills: 2 | Status: SHIPPED | OUTPATIENT
Start: 2024-07-18 | End: 2024-10-16

## 2024-07-18 RX ORDER — LAMOTRIGINE 100 MG/1
TABLET ORAL
Qty: 30 TABLET | Refills: 2 | OUTPATIENT
Start: 2024-07-18

## 2024-07-18 NOTE — PROGRESS NOTES
"Outpatient Psychiatry      Subjective   Jennifer Blackwood \"Martina\", is a 43 y.o. female,  seen in follow-up.        Assessment/Plan   Problem List Items Addressed This Visit             ICD-10-CM    Generalized anxiety disorder F41.1     Discussed option to further titrate buspirone, and she would prefer to defer medication changes until after upcoming surgery. Has tolerated titration of buspirone with fair benefit. Otherwise, will continue current regimen.     Continue buspirone 5 mg PO BID (taking as single dose of 10 mg PO at bedtime)- refill sent to pharmacy today.   Continue desvenlafaxine ER 50 mg PO daily. -no refill needed today.    Continue alprazolam PRN 0.5 mg PO PRN for severe anxiety/panic (short supply- 10 tabs per fill). Taking sparingly. Reasonable to continue while cross-titrating antidepressant medication.  Completed annual UDS- consistent with current rx.   Continue engagement in individual therapy.     Reviewed OARRS, no discrepancies or concerns. Discussed risk of motor/cognitive impairment, sedation, dependence, tolerance, abuse, withdrawal sequelae, accidental overdose, life-threatening respiratory depression (bhavesh. in combination with alcohol and/or opioids), excess sedation in combination with other sedating medicines.  Noted that she was recently rx'd diazepam intravaginally for chronic pelvic pain.          Relevant Medications    busPIRone (Buspar) 5 mg tablet    Other Relevant Orders    Follow Up In Psychiatry    PTSD (post-traumatic stress disorder) F43.10     Continue medications and individual therapy as noted above.          Relevant Medications    lamoTRIgine (LaMICtal) 100 mg tablet    lamoTRIgine (LaMICtal) 25 mg tablet    Other Relevant Orders    Follow Up In Psychiatry    Moderate episode of recurrent major depressive disorder (Multi) F33.1     Continue desvenlafaxine as noted above.   Continue lamotrigine 125 mg PO daily-  refill sent to pharmacy today.   Continue individual " therapy.     Advised of potentially fatal rash (i.e. Mcdowell-Luis Syndrome) in 1:10,000 individuals. Agrees to stop medication and seek medical attention immediately if rash or blistering of the mucosal surfaces develops; also discussed risk for benign drug-induced rash, and advised to cease medication and seek immediate medical attention to r/o SJS given its potential lethality. Further advised that the risk for this reaction increases if proper dose titration is not followed, and that after 2 days of non-adherence, re-titration is necessary.          Relevant Medications    lamoTRIgine (LaMICtal) 100 mg tablet    lamoTRIgine (LaMICtal) 25 mg tablet    Other Relevant Orders    Follow Up In Psychiatry       Follow-up in 4-6 weeks.     Risk Assessment:  Risk Assessment: Jennifer Blackwood is currently a low acute risk of suicide and self-harm due to no past suicide attempt(s) and not currently endorsing thoughts of suicide. Jennifer Blackwood is currently a low acute risk of violence and harm to others due to no past history of violence and not currently threatening others.  Suicidal Risk Factors: , history of trauma/abuse, chronic medical illness, current psychiatric illness, and panic attacks  Violence Risk Factors: none  Protective Factors: strong coping skills, sense of responsibility towards family, social support/connectedness, child related concerns/living with child <18 years, positive family relationships, hopefulness/future orientation, and marriage/partnership     Provided with crisis/emergency resources, including the [facility] Crisis Hotline 1-265.111.5886, Crisis Text Line (text 1XMZX bw 445368) and the National Suicide Prevention Lifeline hotline 1-685.676.5508. Agrees to call 988 or go to the nearest emergency department if she feels unsafe, or has suicidal thinking with a plan or intent.        Reason for Visit:  Follow-up for medication management.     HPI:  Since her last visit,  "    Anxiety has been, \"at an all time high.\"  Trying different strategies recommended by individual therapist, and some help to an extent.   Would eventually like to be on less medication and find more non-pharmacologic strategies to help her sx.     No issue with starting buspirone, now at 10 mg PO at bedtime.   Feels like it's made a difference, and recalls that when she took it in the past it seemed to be helpful.   Was out of alprazolam PRN for several weeks- had a difficult time reaching the office to request a refill.     Over the past few weeks there were times that panic attacks were so severe she considered going to the hospital.   Not typically triggered by anything specific.  Not more frequent, but not having PRN medication made it very difficult to manage the most severe attacks.     Sleep has been, \"very back and forth.'  For a few days she'll sleep through the night, and then a few days that sleep will be very broken.   Sometimes r/t anxiety, sometimes r/t physical discomfort.     Denies suicidal ideation or a passive death wish.     No new medications or health problems. Pelvic floor surgery scheduled early next month.      Current Psychiatric Medications:  Desvenlafaxine 50 mg PO daily  Buspirone 10 mg PO at bedtime- taking (2) 5 mg tabs at bedtime  Lamotrigine 125 mg PO daily  Trazodone  mg PO at bedtime PRN for sleep  Alprazolam 0.5 mg PO PRN for severe anxiety      Record Review: brief     Medical Review Of Systems:  Pertinent items are noted in HPI.    Psychiatric Review Of Systems:  As noted in HPI.        Objective   Mental Status Exam  General Appearance: Well groomed, appropriate eye contact  Attitude/Behavior: Cooperative  Motor: No psychomotor agitation or retardation, no tremor or other abnormal movements  Speech: Normal rate, volume, prosody  Gait/Station: Other:(comment) (not observed- virtual)  Mood: \"Not great.\"  Affect: Anxious, Congruent with mood and topic of " conversation  Thought Process: Linear, goal directed  Thought Associations: No loosening of associations  Thought Content: Other: (comment) (anxious themes)  Perception: No perceptual abnormalities noted  Sensorium: Alert and oriented to person, place, time and situation  Insight: Intact  Judgement: Intact  Cognition: Cognitively intact to conversational testing with respect to attention, orientation, fund of knowledge, recent and remote memory, and language    Vitals:  There were no vitals filed for this visit.    Labs:  No visits with results within 2 Month(s) from this visit.   Latest known visit with results is:   Lab on 04/29/2024   Component Date Value    Creatinine, Urine Random 04/29/2024 212.7     Amphetamine Screen, Urine 04/29/2024 Presumptive Negative     Barbiturate Screen, Urine 04/29/2024 Presumptive Negative     Cannabinoid Screen, Urine 04/29/2024 Presumptive Negative     Cocaine Metabolite Scree* 04/29/2024 Presumptive Negative     PCP Screen, Urine 04/29/2024 Presumptive Negative     Clonazepam 04/29/2024 <25     7-Aminoclonazepam 04/29/2024 <25     Alprazolam 04/29/2024 93 (H)     Alpha-Hydroxyalprazolam 04/29/2024 209 (H)     Midazolam 04/29/2024 <25     Alpha-Hydroxymidazolam 04/29/2024 <25     Chlordiazepoxide 04/29/2024 <25     Diazepam 04/29/2024 <25     Nordiazepam 04/29/2024 >1,000 (H)     Temazepam 04/29/2024 >1,000 (H)     Oxazepam 04/29/2024 >1,000 (H)     Lorazepam 04/29/2024 <25     Methadone 04/29/2024 <25     EDDP 04/29/2024 <25     6-Acetylmorphine 04/29/2024 <25     Codeine 04/29/2024 <50     Hydrocodone 04/29/2024 <25     Hydromorphone 04/29/2024 <25     Morphine  04/29/2024 <50     Norhydrocodone 04/29/2024 <25     Noroxycodone 04/29/2024 <25     Oxycodone 04/29/2024 <25     Oxymorphone 04/29/2024 <25     Fentanyl 04/29/2024 <2.5     Norfentanyl 04/29/2024 <2.5     Tramadol 04/29/2024 <50     O-Desmethyltramadol 04/29/2024 <50     Zolpidem 04/29/2024 <25     Zolpidem Metabolite  (ZCA) 04/29/2024 <25            Allyn Acosta, APRN-CNP, APRN-CNS

## 2024-07-18 NOTE — ASSESSMENT & PLAN NOTE
Discussed option to further titrate buspirone, and she would prefer to defer medication changes until after upcoming surgery. Has tolerated titration of buspirone with fair benefit. Otherwise, will continue current regimen.     Continue buspirone 5 mg PO BID (taking as single dose of 10 mg PO at bedtime)- refill sent to pharmacy today.   Continue desvenlafaxine ER 50 mg PO daily. -no refill needed today.    Continue alprazolam PRN 0.5 mg PO PRN for severe anxiety/panic (short supply- 10 tabs per fill). Taking sparingly. Reasonable to continue while cross-titrating antidepressant medication.  Completed annual UDS- consistent with current rx.   Continue engagement in individual therapy.     Reviewed OARRS, no discrepancies or concerns. Discussed risk of motor/cognitive impairment, sedation, dependence, tolerance, abuse, withdrawal sequelae, accidental overdose, life-threatening respiratory depression (bhavesh. in combination with alcohol and/or opioids), excess sedation in combination with other sedating medicines.  Noted that she was recently rx'd diazepam intravaginally for chronic pelvic pain.

## 2024-07-18 NOTE — ASSESSMENT & PLAN NOTE
Continue desvenlafaxine as noted above.   Continue lamotrigine 125 mg PO daily-  refill sent to pharmacy today.   Continue individual therapy.     Advised of potentially fatal rash (i.e. Mcdowell-Luis Syndrome) in 1:10,000 individuals. Agrees to stop medication and seek medical attention immediately if rash or blistering of the mucosal surfaces develops; also discussed risk for benign drug-induced rash, and advised to cease medication and seek immediate medical attention to r/o SJS given its potential lethality. Further advised that the risk for this reaction increases if proper dose titration is not followed, and that after 2 days of non-adherence, re-titration is necessary.

## 2024-07-28 DIAGNOSIS — F41.1 GENERALIZED ANXIETY DISORDER: ICD-10-CM

## 2024-07-29 RX ORDER — BUSPIRONE HYDROCHLORIDE 5 MG/1
5 TABLET ORAL 2 TIMES DAILY
Qty: 60 TABLET | Refills: 2 | Status: SHIPPED | OUTPATIENT
Start: 2024-07-29

## 2024-08-01 ENCOUNTER — APPOINTMENT (OUTPATIENT)
Dept: NEUROLOGY | Facility: CLINIC | Age: 44
End: 2024-08-01
Payer: COMMERCIAL

## 2024-08-29 DIAGNOSIS — F41.1 GENERALIZED ANXIETY DISORDER: ICD-10-CM

## 2024-08-29 DIAGNOSIS — F43.10 PTSD (POST-TRAUMATIC STRESS DISORDER): ICD-10-CM

## 2024-08-29 PROBLEM — G62.9 POLYNEUROPATHY, UNSPECIFIED: Status: ACTIVE | Noted: 2018-07-10

## 2024-08-29 PROBLEM — F41.8 OTHER SPECIFIED ANXIETY DISORDERS: Status: ACTIVE | Noted: 2018-07-10

## 2024-08-29 PROBLEM — Z13.6 ENCOUNTER FOR SCREENING FOR CARDIOVASCULAR DISORDERS: Status: ACTIVE | Noted: 2018-01-19

## 2024-08-29 PROBLEM — R11.0 NAUSEA: Status: ACTIVE | Noted: 2024-07-15

## 2024-08-29 PROBLEM — N64.4 MASTODYNIA: Status: ACTIVE | Noted: 2018-04-10

## 2024-08-29 PROBLEM — K76.0 FATTY (CHANGE OF) LIVER, NOT ELSEWHERE CLASSIFIED: Status: ACTIVE | Noted: 2023-06-05

## 2024-08-29 PROBLEM — H93.19 TINNITUS: Status: ACTIVE | Noted: 2024-08-29

## 2024-08-29 PROBLEM — Z85.3 PERSONAL HISTORY OF MALIGNANT NEOPLASM OF BREAST: Status: ACTIVE | Noted: 2023-06-12

## 2024-08-29 PROBLEM — R10.2 PELVIC AND PERINEAL PAIN: Status: ACTIVE | Noted: 2018-12-04

## 2024-08-29 PROBLEM — M54.16 RADICULOPATHY, LUMBAR REGION: Status: ACTIVE | Noted: 2018-07-10

## 2024-08-29 PROBLEM — F17.200 NICOTINE DEPENDENCE, UNSPECIFIED, UNCOMPLICATED: Status: ACTIVE | Noted: 2023-06-12

## 2024-08-29 PROBLEM — K21.9 GASTROESOPHAGEAL REFLUX DISEASE: Status: ACTIVE | Noted: 2023-06-12

## 2024-08-29 PROBLEM — Z00.00 ENCNTR FOR GENERAL ADULT MEDICAL EXAM W/O ABNORMAL FINDINGS: Status: ACTIVE | Noted: 2018-01-19

## 2024-08-29 PROBLEM — Z98.890 PONV (POSTOPERATIVE NAUSEA AND VOMITING): Status: ACTIVE | Noted: 2024-07-15

## 2024-08-29 PROBLEM — K58.9 IRRITABLE BOWEL SYNDROME WITHOUT DIARRHEA: Status: ACTIVE | Noted: 2023-06-05

## 2024-08-29 PROBLEM — F32.A DEPRESSION, UNSPECIFIED: Status: ACTIVE | Noted: 2023-06-05

## 2024-08-29 PROBLEM — R11.2 PONV (POSTOPERATIVE NAUSEA AND VOMITING): Status: ACTIVE | Noted: 2024-07-15

## 2024-08-29 PROBLEM — E11.9 DIABETES MELLITUS WITHOUT COMPLICATION (MULTI): Status: ACTIVE | Noted: 2018-07-10

## 2024-08-29 PROBLEM — N30.10 INTERSTITIAL CYSTITIS (CHRONIC) WITHOUT HEMATURIA: Status: ACTIVE | Noted: 2023-06-05

## 2024-08-29 PROBLEM — R31.9 HEMATURIA, UNSPECIFIED: Status: ACTIVE | Noted: 2018-12-04

## 2024-08-29 PROBLEM — L08.9 INFECTION OF SKIN: Status: ACTIVE | Noted: 2024-08-29

## 2024-08-29 RX ORDER — ALPRAZOLAM 0.5 MG/1
0.5 TABLET ORAL DAILY PRN
Qty: 10 TABLET | Refills: 1 | Status: SHIPPED | OUTPATIENT
Start: 2024-08-29

## 2024-08-30 PROBLEM — E66.812 CLASS 2 OBESITY WITH BODY MASS INDEX (BMI) OF 37.0 TO 37.9 IN ADULT: Status: RESOLVED | Noted: 2023-10-02 | Resolved: 2024-08-30

## 2024-08-30 PROBLEM — E66.9 CLASS 2 OBESITY WITH BODY MASS INDEX (BMI) OF 37.0 TO 37.9 IN ADULT: Status: RESOLVED | Noted: 2023-10-02 | Resolved: 2024-08-30

## 2024-08-30 PROBLEM — E66.812 CLASS 2 OBESITY WITH BODY MASS INDEX (BMI) OF 35.0 TO 35.9 IN ADULT: Status: RESOLVED | Noted: 2023-10-02 | Resolved: 2024-08-30

## 2024-08-30 PROBLEM — E66.9 CLASS 2 OBESITY WITH BODY MASS INDEX (BMI) OF 35.0 TO 35.9 IN ADULT: Status: RESOLVED | Noted: 2023-10-02 | Resolved: 2024-08-30

## 2024-08-30 PROBLEM — F32.A DEPRESSION, UNSPECIFIED: Status: RESOLVED | Noted: 2023-06-05 | Resolved: 2024-08-30

## 2024-09-11 ENCOUNTER — APPOINTMENT (OUTPATIENT)
Dept: BEHAVIORAL HEALTH | Facility: CLINIC | Age: 44
End: 2024-09-11
Payer: COMMERCIAL

## 2024-11-08 ENCOUNTER — APPOINTMENT (OUTPATIENT)
Dept: NEUROLOGY | Facility: CLINIC | Age: 44
End: 2024-11-08
Payer: COMMERCIAL

## 2024-11-11 ENCOUNTER — APPOINTMENT (OUTPATIENT)
Dept: PRIMARY CARE | Facility: CLINIC | Age: 44
End: 2024-11-11
Payer: COMMERCIAL

## 2024-11-11 VITALS
HEART RATE: 72 BPM | BODY MASS INDEX: 28.12 KG/M2 | TEMPERATURE: 97.6 F | OXYGEN SATURATION: 97 % | WEIGHT: 169 LBS | DIASTOLIC BLOOD PRESSURE: 75 MMHG | SYSTOLIC BLOOD PRESSURE: 115 MMHG

## 2024-11-11 DIAGNOSIS — R51.9 CHRONIC NONINTRACTABLE HEADACHE, UNSPECIFIED HEADACHE TYPE: ICD-10-CM

## 2024-11-11 DIAGNOSIS — E55.9 VITAMIN D DEFICIENCY: Primary | ICD-10-CM

## 2024-11-11 DIAGNOSIS — G62.9 NEUROPATHY: ICD-10-CM

## 2024-11-11 DIAGNOSIS — G89.29 CHRONIC NONINTRACTABLE HEADACHE, UNSPECIFIED HEADACHE TYPE: ICD-10-CM

## 2024-11-11 PROBLEM — H60.509 ACUTE OTITIS EXTERNA: Status: RESOLVED | Noted: 2024-07-16 | Resolved: 2024-11-11

## 2024-11-11 PROBLEM — N76.0 ACUTE VAGINITIS: Status: RESOLVED | Noted: 2024-07-16 | Resolved: 2024-11-11

## 2024-11-11 PROBLEM — U07.1 DISEASE DUE TO SEVERE ACUTE RESPIRATORY SYNDROME CORONAVIRUS 2 (SARS-COV-2): Status: RESOLVED | Noted: 2024-07-16 | Resolved: 2024-11-11

## 2024-11-11 PROBLEM — R68.89 GENERAL SYMPTOM: Status: RESOLVED | Noted: 2024-07-16 | Resolved: 2024-11-11

## 2024-11-11 PROBLEM — Z00.00 ENCNTR FOR GENERAL ADULT MEDICAL EXAM W/O ABNORMAL FINDINGS: Status: RESOLVED | Noted: 2018-01-19 | Resolved: 2024-11-11

## 2024-11-11 PROBLEM — K76.0 FATTY (CHANGE OF) LIVER, NOT ELSEWHERE CLASSIFIED: Status: RESOLVED | Noted: 2023-06-05 | Resolved: 2024-11-11

## 2024-11-11 PROBLEM — T17.1XXA FOREIGN BODY IN NOSE: Status: RESOLVED | Noted: 2024-07-16 | Resolved: 2024-11-11

## 2024-11-11 PROBLEM — R11.2 NAUSEA AND VOMITING: Status: RESOLVED | Noted: 2024-07-16 | Resolved: 2024-11-11

## 2024-11-11 PROBLEM — Z98.890 PONV (POSTOPERATIVE NAUSEA AND VOMITING): Status: RESOLVED | Noted: 2024-07-15 | Resolved: 2024-11-11

## 2024-11-11 PROBLEM — E66.9 OBESITY: Status: RESOLVED | Noted: 2023-06-05 | Resolved: 2024-11-11

## 2024-11-11 PROBLEM — Z13.6 ENCOUNTER FOR SCREENING FOR CARDIOVASCULAR DISORDERS: Status: RESOLVED | Noted: 2018-01-19 | Resolved: 2024-11-11

## 2024-11-11 PROBLEM — S16.1XXA STRAIN OF NECK MUSCLE: Status: RESOLVED | Noted: 2024-07-16 | Resolved: 2024-11-11

## 2024-11-11 PROBLEM — R11.2 NAUSEA, VOMITING, AND DIARRHEA: Status: RESOLVED | Noted: 2024-07-16 | Resolved: 2024-11-11

## 2024-11-11 PROBLEM — R11.0 NAUSEA: Status: RESOLVED | Noted: 2024-07-15 | Resolved: 2024-11-11

## 2024-11-11 PROBLEM — R19.7 NAUSEA, VOMITING, AND DIARRHEA: Status: RESOLVED | Noted: 2024-07-16 | Resolved: 2024-11-11

## 2024-11-11 PROBLEM — J20.9 ACUTE BRONCHITIS: Status: RESOLVED | Noted: 2024-07-16 | Resolved: 2024-11-11

## 2024-11-11 PROBLEM — K58.9 IRRITABLE BOWEL SYNDROME WITHOUT DIARRHEA: Status: RESOLVED | Noted: 2023-06-05 | Resolved: 2024-11-11

## 2024-11-11 PROBLEM — M41.9 SCOLIOSIS, UNSPECIFIED: Status: RESOLVED | Noted: 2023-06-05 | Resolved: 2024-11-11

## 2024-11-11 PROBLEM — R19.7 DIARRHEA: Status: RESOLVED | Noted: 2024-07-16 | Resolved: 2024-11-11

## 2024-11-11 PROBLEM — R11.2 PONV (POSTOPERATIVE NAUSEA AND VOMITING): Status: RESOLVED | Noted: 2024-07-15 | Resolved: 2024-11-11

## 2024-11-11 PROBLEM — B37.2 INTERTRIGINOUS CANDIDIASIS: Status: RESOLVED | Noted: 2024-07-16 | Resolved: 2024-11-11

## 2024-11-11 PROBLEM — R11.10 VOMITING: Status: RESOLVED | Noted: 2024-07-16 | Resolved: 2024-11-11

## 2024-11-11 PROBLEM — E66.9 OBESITY WITH BODY MASS INDEX 30 OR GREATER: Status: RESOLVED | Noted: 2024-07-16 | Resolved: 2024-11-11

## 2024-11-11 PROBLEM — L28.2 PRURITIC RASH: Status: RESOLVED | Noted: 2024-07-16 | Resolved: 2024-11-11

## 2024-11-11 PROBLEM — F17.200 NICOTINE DEPENDENCE, UNSPECIFIED, UNCOMPLICATED: Status: RESOLVED | Noted: 2023-06-12 | Resolved: 2024-11-11

## 2024-11-11 PROBLEM — S01.339A COMPLICATION OF EAR PIERCING: Status: RESOLVED | Noted: 2024-07-16 | Resolved: 2024-11-11

## 2024-11-11 PROCEDURE — 99214 OFFICE O/P EST MOD 30 MIN: CPT | Performed by: FAMILY MEDICINE

## 2024-11-11 RX ORDER — GABAPENTIN 400 MG/1
1200 CAPSULE ORAL NIGHTLY
Qty: 90 CAPSULE | Refills: 5 | Status: SHIPPED | OUTPATIENT
Start: 2024-11-11

## 2024-11-11 ASSESSMENT — ENCOUNTER SYMPTOMS
COUGH: 0
HEADACHES: 1
FEVER: 0
NUMBNESS: 1

## 2024-11-11 NOTE — ASSESSMENT & PLAN NOTE
Orders:    CBC; Future    Comprehensive Metabolic Panel; Future    Vitamin D 25-Hydroxy,Total (for eval of Vitamin D levels); Future    Lipid Panel; Future    Hemoglobin A1C; Future

## 2024-11-11 NOTE — PROGRESS NOTES
"Subjective   Patient ID: Jennifer Blackwood \"Janeth" is a 44 y.o. female who presents for Follow-up.    HPI     She is here today for 6-month follow-up  She has a history of chronic bilateral lower extremity neuropathy which has been present since she was a teenager.  She previously had an EMG of lower extremities done 2018 which showed mild bilateral L5 radiculopathy  She is currently taking gabapentin 1200 mg at bedtime.  She feels that this dose and regimen has been working well for her.  She has had difficulty tolerating daytime doses of gabapentin in the past  She is still getting neuropathy in both legs which goes up to just above the level of the knees.  This usually gets worse at night.  It can be up to an 8 out of 10 intensity pain at night.  Pain is typically less severe during the day.  She describes as a pins-and-needles sensation  She has a history of chronic headaches and is currently taking amitriptyline 25 mg daily.  Previous brain MRI done 10/2023 was unremarkable other than a single focus of nonspecific hyperintensity located within the left centrum semiovale, possibly reflecting gliosis and of unclear significance.  She feels that the amitriptyline has been helping.  At her last visit she was having daily sharp headaches and had been taking amitriptyline 20 mg nightly at that time.  We increased the dose to 25 mg nightly.  Now she gets a mild headache which only occurs every few days.  This is located bilateral forehead and occiput.  She is still working on getting into see neurology (the first time she had been scheduled to see them there was a 3.5-hour wait and they transferred her to another specialist, and her most recent visit was canceled)  She is currently taking weekly vitamin D 50,000 IU for history of vitamin D deficiency.  Her last vitamin D level checked 2/21/2023 was normal range at 31      Review of Systems   Constitutional:  Negative for fever.   Respiratory:  Negative for cough.  "   Neurological:  Positive for numbness and headaches.       Objective   /75   Pulse 72   Temp 36.4 °C (97.6 °F) (Temporal)   Wt 76.7 kg (169 lb)   SpO2 97%   BMI 28.12 kg/m²     Physical Exam  Vitals reviewed.   Constitutional:       General: She is not in acute distress.     Appearance: Normal appearance. She is well-developed.   HENT:      Head: Normocephalic.   Eyes:      Conjunctiva/sclera: Conjunctivae normal.   Cardiovascular:      Rate and Rhythm: Normal rate and regular rhythm.      Heart sounds: Normal heart sounds.   Pulmonary:      Effort: Pulmonary effort is normal.      Breath sounds: Normal breath sounds.   Musculoskeletal:      Right lower leg: No edema.      Left lower leg: No edema.   Skin:     Findings: No rash.   Neurological:      Mental Status: She is alert.      Sensory: No sensory deficit (Lower extremity sensation intact).   Psychiatric:         Mood and Affect: Mood normal.         Behavior: Behavior normal.         Assessment/Plan   Assessment & Plan  Neuropathy    Orders:    gabapentin (Neurontin) 400 mg capsule; Take 3 capsules (1,200 mg) by mouth once daily at bedtime.    CBC; Future    Comprehensive Metabolic Panel; Future    Vitamin D 25-Hydroxy,Total (for eval of Vitamin D levels); Future    Lipid Panel; Future    Hemoglobin A1C; Future    Vitamin D deficiency    Orders:    CBC; Future    Comprehensive Metabolic Panel; Future    Vitamin D 25-Hydroxy,Total (for eval of Vitamin D levels); Future    Lipid Panel; Future    Hemoglobin A1C; Future    Chronic nonintractable headache, unspecified headache type         Her chronic bilateral lower extremity neuropathy has remained stable and adequately controlled.  We will continue current dose of gabapentin 1200 mg nightly and follow-up in 6 months for recheck  Her chronic headaches have improved, and now are only occurring every few days.  We will continue amitriptyline at 25 mg nightly and she will continue to work on getting  established with neurology  Continue weekly vitamin D for vitamin D deficiency.  Her last vitamin D level was in normal range.  Check full labs including vitamin D level  Follow-up in 6 months for recheck and CPE

## 2024-11-11 NOTE — ASSESSMENT & PLAN NOTE
Orders:    gabapentin (Neurontin) 400 mg capsule; Take 3 capsules (1,200 mg) by mouth once daily at bedtime.    CBC; Future    Comprehensive Metabolic Panel; Future    Vitamin D 25-Hydroxy,Total (for eval of Vitamin D levels); Future    Lipid Panel; Future    Hemoglobin A1C; Future

## 2025-01-03 ENCOUNTER — APPOINTMENT (OUTPATIENT)
Dept: PRIMARY CARE | Facility: CLINIC | Age: 45
End: 2025-01-03
Payer: COMMERCIAL

## 2025-01-07 ENCOUNTER — APPOINTMENT (OUTPATIENT)
Dept: PRIMARY CARE | Facility: CLINIC | Age: 45
End: 2025-01-07
Payer: COMMERCIAL

## 2025-01-10 ENCOUNTER — OFFICE VISIT (OUTPATIENT)
Dept: PRIMARY CARE | Facility: CLINIC | Age: 45
End: 2025-01-10
Payer: COMMERCIAL

## 2025-01-10 VITALS — OXYGEN SATURATION: 97 % | SYSTOLIC BLOOD PRESSURE: 131 MMHG | DIASTOLIC BLOOD PRESSURE: 84 MMHG | HEART RATE: 87 BPM

## 2025-01-10 DIAGNOSIS — K58.0 IRRITABLE BOWEL SYNDROME WITH DIARRHEA: Primary | ICD-10-CM

## 2025-01-10 DIAGNOSIS — R11.2 NAUSEA VOMITING AND DIARRHEA: ICD-10-CM

## 2025-01-10 DIAGNOSIS — Z13.31 POSITIVE DEPRESSION SCREENING: ICD-10-CM

## 2025-01-10 DIAGNOSIS — R19.7 NAUSEA VOMITING AND DIARRHEA: ICD-10-CM

## 2025-01-10 PROCEDURE — 99213 OFFICE O/P EST LOW 20 MIN: CPT | Performed by: FAMILY MEDICINE

## 2025-01-10 RX ORDER — ONDANSETRON 8 MG/1
8 TABLET, ORALLY DISINTEGRATING ORAL EVERY 8 HOURS PRN
Qty: 30 TABLET | Refills: 1 | Status: SHIPPED | OUTPATIENT
Start: 2025-01-10

## 2025-01-10 ASSESSMENT — PATIENT HEALTH QUESTIONNAIRE - PHQ9
9. THOUGHTS THAT YOU WOULD BE BETTER OFF DEAD, OR OF HURTING YOURSELF: NOT AT ALL
1. LITTLE INTEREST OR PLEASURE IN DOING THINGS: NEARLY EVERY DAY
7. TROUBLE CONCENTRATING ON THINGS, SUCH AS READING THE NEWSPAPER OR WATCHING TELEVISION: NEARLY EVERY DAY
8. MOVING OR SPEAKING SO SLOWLY THAT OTHER PEOPLE COULD HAVE NOTICED. OR THE OPPOSITE, BEING SO FIGETY OR RESTLESS THAT YOU HAVE BEEN MOVING AROUND A LOT MORE THAN USUAL: NEARLY EVERY DAY
SUM OF ALL RESPONSES TO PHQ9 QUESTIONS 1 AND 2: 6
2. FEELING DOWN, DEPRESSED OR HOPELESS: NEARLY EVERY DAY
SUM OF ALL RESPONSES TO PHQ QUESTIONS 1-9: 24
6. FEELING BAD ABOUT YOURSELF - OR THAT YOU ARE A FAILURE OR HAVE LET YOURSELF OR YOUR FAMILY DOWN: NEARLY EVERY DAY
4. FEELING TIRED OR HAVING LITTLE ENERGY: NEARLY EVERY DAY
5. POOR APPETITE OR OVEREATING: NEARLY EVERY DAY
3. TROUBLE FALLING OR STAYING ASLEEP OR SLEEPING TOO MUCH: NEARLY EVERY DAY
10. IF YOU CHECKED OFF ANY PROBLEMS, HOW DIFFICULT HAVE THESE PROBLEMS MADE IT FOR YOU TO DO YOUR WORK, TAKE CARE OF THINGS AT HOME, OR GET ALONG WITH OTHER PEOPLE: EXTREMELY DIFFICULT

## 2025-01-10 ASSESSMENT — ENCOUNTER SYMPTOMS
FEVER: 0
DIARRHEA: 1
ABDOMINAL PAIN: 1

## 2025-01-10 NOTE — PROGRESS NOTES
"Subjective   Patient ID: Jennifer Blackwood \"Martina\" is a 44 y.o. female who presents for No chief complaint on file..    HPI     Review of Systems    Objective   /84   Pulse 87   SpO2 97%     Physical Exam    Assessment/Plan   Assessment & Plan  Nausea vomiting and diarrhea    Orders:    ondansetron ODT (Zofran-ODT) 8 mg disintegrating tablet; Dissolve 1 tablet (8 mg) in the mouth every 8 hours if needed for nausea or vomiting.    Irritable bowel syndrome with diarrhea    Orders:    rifAXIMin (Xifaxan) 550 mg tablet; Take 1 tablet (550 mg) by mouth 3 times a day for 14 days.    Referral to Gastroenterology; Future       "

## 2025-01-10 NOTE — PROGRESS NOTES
"Subjective   Patient ID: Jennifer Blackwood \"Janeth" is a 44 y.o. female who presents for No chief complaint on file..    Diarrhea   The current episode started more than 1 month ago. Associated symptoms include abdominal pain. Pertinent negatives include no fever.        She is here today to discuss a possible IBS flareup  She has a history of diarrhea predominant IBS and has been following with gastroenterology  Approximately 4 to 5 weeks ago she developed symptoms including abdominal cramping, diarrhea, nausea  She recently had a death in the family in late November and she has had a lots of stress since then which has been contributing to her symptoms  She did have a prescription for Xifaxan and took a 2-week course of this medication.  It had helped with the diarrhea, but her symptoms persisted, and she did restart the course approximately 1.5 weeks ago  She has also been taking Zofran as needed for nausea and dicyclomine as needed for cramping and diarrhea.  Her symptoms have gotten better, but she is still having diffuse abdominal cramping and watery diarrhea.  The cramping ranges anywhere from a 7 to a 9 out of 10 intensity pain.  She states that her current symptoms feel similar to IBS flareups that she has had in the past  She currently follows with psychiatry and is scheduled to see them in 10 days.  She has been feeling both depressed and anxious and had a panic attack last week          Patient Health Questionnaire-2 Score: 6 (1/10/2025  2:18 PM)      Review of Systems   Constitutional:  Negative for fever.   Gastrointestinal:  Positive for abdominal pain and diarrhea.       Objective   /84   Pulse 87   SpO2 97%     Physical Exam  Vitals reviewed.   Constitutional:       General: She is not in acute distress.     Appearance: Normal appearance. She is well-developed.   HENT:      Head: Normocephalic.   Eyes:      Conjunctiva/sclera: Conjunctivae normal.   Cardiovascular:      Rate and Rhythm: " Normal rate and regular rhythm.      Heart sounds: Normal heart sounds.   Pulmonary:      Effort: Pulmonary effort is normal.      Breath sounds: Normal breath sounds.   Abdominal:      Palpations: Abdomen is soft.      Tenderness: There is abdominal tenderness.      Comments: Diffuse abdominal tenderness   Skin:     Findings: No rash.   Neurological:      Mental Status: She is alert.   Psychiatric:         Mood and Affect: Mood normal.         Behavior: Behavior normal.         Assessment/Plan   Assessment & Plan  Irritable bowel syndrome with diarrhea    Orders:    rifAXIMin (Xifaxan) 550 mg tablet; Take 1 tablet (550 mg) by mouth 3 times a day for 14 days.    Referral to Gastroenterology; Future    Nausea vomiting and diarrhea    Orders:    ondansetron ODT (Zofran-ODT) 8 mg disintegrating tablet; Dissolve 1 tablet (8 mg) in the mouth every 8 hours if needed for nausea or vomiting.    Positive depression screening         She presents today with a 5 to 6-week history of symptoms including diffuse abdominal cramping, watery diarrhea, and nausea.  She has had similar symptoms with IBS-D flareups in the past, and feels that her current symptoms are due to an IBS flareup.  I did give her a refill on Xifaxan so she can complete the full 2-week course.  Continue ondansetron as needed for nausea and dicyclomine as needed for cramping and diarrhea.  She is requesting a new referral to gastroenterology.  This was given to her today  I did offer to work this up with labs and imaging-she feels that her symptoms are due to an IBS flareup and does not feel that she needs any labs or imaging at this time, but we will consider this if symptoms are not better by follow-up  I did discuss that if she develops any severe worsening abdominal pain or blood in stool she should go to the ER  Her PHQ score today is 6, and she reports that she has had a lot of stress and anxiety which have been contributing to her recent IBS symptoms.   Continue to follow with psychiatry for this  Follow-up in 2 to 3 weeks for recheck

## 2025-01-10 NOTE — ASSESSMENT & PLAN NOTE
Orders:    rifAXIMin (Xifaxan) 550 mg tablet; Take 1 tablet (550 mg) by mouth 3 times a day for 14 days.    Referral to Gastroenterology; Future

## 2025-01-13 ENCOUNTER — APPOINTMENT (OUTPATIENT)
Dept: PRIMARY CARE | Facility: CLINIC | Age: 45
End: 2025-01-13
Payer: COMMERCIAL

## 2025-01-20 ENCOUNTER — TELEMEDICINE (OUTPATIENT)
Dept: BEHAVIORAL HEALTH | Facility: CLINIC | Age: 45
End: 2025-01-20
Payer: COMMERCIAL

## 2025-01-20 DIAGNOSIS — F33.1 MODERATE EPISODE OF RECURRENT MAJOR DEPRESSIVE DISORDER: ICD-10-CM

## 2025-01-20 DIAGNOSIS — F43.10 PTSD (POST-TRAUMATIC STRESS DISORDER): ICD-10-CM

## 2025-01-20 DIAGNOSIS — G47.09 OTHER INSOMNIA: ICD-10-CM

## 2025-01-20 DIAGNOSIS — F43.21 GRIEF: ICD-10-CM

## 2025-01-20 DIAGNOSIS — F41.1 GENERALIZED ANXIETY DISORDER: ICD-10-CM

## 2025-01-20 PROCEDURE — 99214 OFFICE O/P EST MOD 30 MIN: CPT | Performed by: CLINICAL NURSE SPECIALIST

## 2025-01-20 RX ORDER — BUSPIRONE HYDROCHLORIDE 5 MG/1
5 TABLET ORAL 2 TIMES DAILY
Qty: 60 TABLET | Refills: 2 | Status: SHIPPED | OUTPATIENT
Start: 2025-01-20

## 2025-01-20 RX ORDER — DESVENLAFAXINE 50 MG/1
50 TABLET, EXTENDED RELEASE ORAL DAILY
Qty: 30 TABLET | Refills: 2 | Status: SHIPPED | OUTPATIENT
Start: 2025-01-20

## 2025-01-20 RX ORDER — LAMOTRIGINE 25 MG/1
TABLET ORAL
Qty: 42 TABLET | Refills: 0 | Status: SHIPPED | OUTPATIENT
Start: 2025-01-20 | End: 2025-02-17

## 2025-01-20 RX ORDER — LAMOTRIGINE 100 MG/1
100 TABLET ORAL DAILY
Qty: 30 TABLET | Refills: 2 | Status: SHIPPED | OUTPATIENT
Start: 2025-01-20 | End: 2025-04-20

## 2025-01-20 RX ORDER — ALPRAZOLAM 0.5 MG/1
0.5 TABLET ORAL DAILY PRN
Qty: 10 TABLET | Refills: 1 | Status: SHIPPED | OUTPATIENT
Start: 2025-01-20

## 2025-01-20 RX ORDER — TRAZODONE HYDROCHLORIDE 100 MG/1
150-200 TABLET ORAL NIGHTLY
Qty: 60 TABLET | Refills: 2 | Status: SHIPPED | OUTPATIENT
Start: 2025-01-20 | End: 2025-04-20
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
## 2025-01-22 DIAGNOSIS — K58.0 IRRITABLE BOWEL SYNDROME WITH DIARRHEA: ICD-10-CM

## 2025-01-24 ENCOUNTER — APPOINTMENT (OUTPATIENT)
Dept: PRIMARY CARE | Facility: CLINIC | Age: 45
End: 2025-01-24
Payer: COMMERCIAL

## 2025-01-24 VITALS
DIASTOLIC BLOOD PRESSURE: 76 MMHG | BODY MASS INDEX: 28.12 KG/M2 | OXYGEN SATURATION: 100 % | HEART RATE: 66 BPM | WEIGHT: 169 LBS | SYSTOLIC BLOOD PRESSURE: 134 MMHG

## 2025-01-24 DIAGNOSIS — E55.9 VITAMIN D DEFICIENCY: ICD-10-CM

## 2025-01-24 DIAGNOSIS — G62.9 NEUROPATHY: ICD-10-CM

## 2025-01-24 DIAGNOSIS — K58.0 IRRITABLE BOWEL SYNDROME WITH DIARRHEA: Primary | ICD-10-CM

## 2025-01-24 PROCEDURE — 99213 OFFICE O/P EST LOW 20 MIN: CPT | Performed by: FAMILY MEDICINE

## 2025-01-24 RX ORDER — GABAPENTIN 400 MG/1
1200 CAPSULE ORAL NIGHTLY
Qty: 90 CAPSULE | Refills: 5 | Status: SHIPPED | OUTPATIENT
Start: 2025-01-24

## 2025-01-24 ASSESSMENT — ENCOUNTER SYMPTOMS
ABDOMINAL PAIN: 1
NERVOUS/ANXIOUS: 1
DIARRHEA: 1
FEVER: 0
NAUSEA: 1

## 2025-01-24 NOTE — PROGRESS NOTES
"Subjective   Patient ID: Jennifer Blackwood \"Janeth" is a 44 y.o. female who presents for Follow-up.    HPI     She is here today for 2-week follow-up on IBS  She has a history of IBS-D and previously was following with gastroenterology.  Approximately 2 months ago she developed worsening symptoms including diffuse abdominal cramping, nausea, and diarrhea  She admits to a lot of stress and there was a death in the family in late November, and her IBS symptoms started to flareup after that  Since symptoms started, she has taken 2 courses of Xifaxan with only slight improvement.  She is still having diffuse abdominal cramping, nausea and watery diarrhea.  The cramping is most noticeable in her lower abdomen and is a 7-8 out of 10 intensity pain.  Is present most of the time.  It will improved to a 5 out of 10 when she takes Bentyl.  She is still having watery diarrhea and nausea.  No blood or mucus in stool.  She is scheduled to establish with a new gastroenterologist in March.  Her symptoms are similar to symptoms she has had with IBS in the past, however they typically do not last this long  Last colonoscopy was done in 2021.  She is due for her next colonoscopy next year  Pain has not gotten any worse since it had started, but does not seem to be improving  She is currently following with psychiatry.  They increased her trazodone at most recent visit and restarted her Lamictal.  She slept better, but is still having a lot of anxiety and panic attacks      Review of Systems   Constitutional:  Negative for fever.   Gastrointestinal:  Positive for abdominal pain, diarrhea and nausea.   Psychiatric/Behavioral:  The patient is nervous/anxious.        Objective   /76   Pulse 66   Wt 76.7 kg (169 lb)   SpO2 100%   BMI 28.12 kg/m²     Physical Exam  Vitals reviewed.   Constitutional:       General: She is not in acute distress.     Appearance: Normal appearance. She is well-developed.   HENT:      Head: " Normocephalic.   Eyes:      Conjunctiva/sclera: Conjunctivae normal.   Cardiovascular:      Rate and Rhythm: Normal rate and regular rhythm.      Heart sounds: Normal heart sounds.   Pulmonary:      Effort: Pulmonary effort is normal.      Breath sounds: Normal breath sounds.   Abdominal:      Palpations: Abdomen is soft.      Tenderness: There is abdominal tenderness.      Comments: Diffuse abdominal tenderness present, greatest upper and lower abdomen at midline.  Abdomen is soft   Skin:     Findings: No rash.   Neurological:      Mental Status: She is alert.   Psychiatric:         Mood and Affect: Mood normal.         Behavior: Behavior normal.         Assessment/Plan   Assessment & Plan  Neuropathy    Orders:    gabapentin (Neurontin) 400 mg capsule; Take 3 capsules (1,200 mg) by mouth once daily at bedtime.    Irritable bowel syndrome with diarrhea    Orders:    CBC and Auto Differential; Future    Comprehensive Metabolic Panel; Future    Lipid Panel; Future    Hemoglobin A1C; Future    Vitamin D 25-Hydroxy,Total (for eval of Vitamin D levels); Future    C-Reactive Protein; Future    US abdomen complete; Future    Vitamin D deficiency         She presents today with IBS symptoms including abdominal cramping, nausea and diarrhea which have now been present for 2 months.  She has taken 2 courses of Xifaxan, as well as Bentyl and Zofran with only minimal improvement.  We will check labs including CBC and CRP, and also obtain an abdominal ultrasound.  If the pain persists, or if CRP or CBC is elevated, we will consider proceeding with a CT scan.  Continue Zofran and Bentyl as needed and she is going to be seeing gastroenterology in March to discuss further.  We will plan on following up by phone once I have her lab and imaging results  I also did reorder her routine labs including A1c, lipid panel and vitamin D.  Also given refill on gabapentin today (we did not discuss this in detail today, but we have been  following her regularly for chronic lower extremity neuropathy)

## 2025-01-24 NOTE — ASSESSMENT & PLAN NOTE
Orders:    CBC and Auto Differential; Future    Comprehensive Metabolic Panel; Future    Lipid Panel; Future    Hemoglobin A1C; Future    Vitamin D 25-Hydroxy,Total (for eval of Vitamin D levels); Future    C-Reactive Protein; Future    US abdomen complete; Future

## 2025-01-24 NOTE — ASSESSMENT & PLAN NOTE
Orders:    gabapentin (Neurontin) 400 mg capsule; Take 3 capsules (1,200 mg) by mouth once daily at bedtime.

## 2025-01-25 ENCOUNTER — LAB (OUTPATIENT)
Dept: LAB | Facility: LAB | Age: 45
End: 2025-01-25
Payer: COMMERCIAL

## 2025-01-25 DIAGNOSIS — G62.9 NEUROPATHY: ICD-10-CM

## 2025-01-25 DIAGNOSIS — K58.0 IRRITABLE BOWEL SYNDROME WITH DIARRHEA: ICD-10-CM

## 2025-01-25 DIAGNOSIS — E55.9 VITAMIN D DEFICIENCY: ICD-10-CM

## 2025-01-25 LAB
25(OH)D3 SERPL-MCNC: 8 NG/ML (ref 30–100)
ALBUMIN SERPL BCP-MCNC: 3.9 G/DL (ref 3.4–5)
ALP SERPL-CCNC: 88 U/L (ref 33–110)
ALT SERPL W P-5'-P-CCNC: 45 U/L (ref 7–45)
ANION GAP SERPL CALC-SCNC: 9 MMOL/L (ref 10–20)
AST SERPL W P-5'-P-CCNC: 39 U/L (ref 9–39)
BASOPHILS # BLD AUTO: 0.06 X10*3/UL (ref 0–0.1)
BASOPHILS NFR BLD AUTO: 0.7 %
BILIRUB SERPL-MCNC: 0.5 MG/DL (ref 0–1.2)
BUN SERPL-MCNC: 7 MG/DL (ref 6–23)
CALCIUM SERPL-MCNC: 9 MG/DL (ref 8.6–10.3)
CHLORIDE SERPL-SCNC: 105 MMOL/L (ref 98–107)
CHOLEST SERPL-MCNC: 177 MG/DL (ref 0–199)
CHOLESTEROL/HDL RATIO: 3.6
CO2 SERPL-SCNC: 29 MMOL/L (ref 21–32)
CREAT SERPL-MCNC: 0.78 MG/DL (ref 0.5–1.05)
CRP SERPL-MCNC: 0.37 MG/DL
EGFRCR SERPLBLD CKD-EPI 2021: >90 ML/MIN/1.73M*2
EOSINOPHIL # BLD AUTO: 0.38 X10*3/UL (ref 0–0.7)
EOSINOPHIL NFR BLD AUTO: 4.7 %
ERYTHROCYTE [DISTWIDTH] IN BLOOD BY AUTOMATED COUNT: 13.2 % (ref 11.5–14.5)
EST. AVERAGE GLUCOSE BLD GHB EST-MCNC: 108 MG/DL
GLUCOSE SERPL-MCNC: 96 MG/DL (ref 74–99)
HBA1C MFR BLD: 5.4 %
HCT VFR BLD AUTO: 45.4 % (ref 36–46)
HDLC SERPL-MCNC: 48.6 MG/DL
HGB BLD-MCNC: 15.2 G/DL (ref 12–16)
IMM GRANULOCYTES # BLD AUTO: 0.03 X10*3/UL (ref 0–0.7)
IMM GRANULOCYTES NFR BLD AUTO: 0.4 % (ref 0–0.9)
LDLC SERPL CALC-MCNC: 113 MG/DL
LYMPHOCYTES # BLD AUTO: 2.62 X10*3/UL (ref 1.2–4.8)
LYMPHOCYTES NFR BLD AUTO: 32.3 %
MCH RBC QN AUTO: 30.5 PG (ref 26–34)
MCHC RBC AUTO-ENTMCNC: 33.5 G/DL (ref 32–36)
MCV RBC AUTO: 91 FL (ref 80–100)
MONOCYTES # BLD AUTO: 0.5 X10*3/UL (ref 0.1–1)
MONOCYTES NFR BLD AUTO: 6.2 %
NEUTROPHILS # BLD AUTO: 4.51 X10*3/UL (ref 1.2–7.7)
NEUTROPHILS NFR BLD AUTO: 55.7 %
NON HDL CHOLESTEROL: 128 MG/DL (ref 0–149)
NRBC BLD-RTO: 0 /100 WBCS (ref 0–0)
PLATELET # BLD AUTO: 188 X10*3/UL (ref 150–450)
POTASSIUM SERPL-SCNC: 4.3 MMOL/L (ref 3.5–5.3)
PROT SERPL-MCNC: 6.4 G/DL (ref 6.4–8.2)
RBC # BLD AUTO: 4.98 X10*6/UL (ref 4–5.2)
SODIUM SERPL-SCNC: 139 MMOL/L (ref 136–145)
TRIGL SERPL-MCNC: 75 MG/DL (ref 0–149)
VLDL: 15 MG/DL (ref 0–40)
WBC # BLD AUTO: 8.1 X10*3/UL (ref 4.4–11.3)

## 2025-01-25 PROCEDURE — 86140 C-REACTIVE PROTEIN: CPT

## 2025-01-25 PROCEDURE — 80061 LIPID PANEL: CPT

## 2025-01-25 PROCEDURE — 82306 VITAMIN D 25 HYDROXY: CPT

## 2025-01-25 PROCEDURE — 80053 COMPREHEN METABOLIC PANEL: CPT

## 2025-01-25 PROCEDURE — 85025 COMPLETE CBC W/AUTO DIFF WBC: CPT

## 2025-01-25 PROCEDURE — 83036 HEMOGLOBIN GLYCOSYLATED A1C: CPT

## 2025-01-27 ENCOUNTER — TELEPHONE (OUTPATIENT)
Dept: PRIMARY CARE | Facility: CLINIC | Age: 45
End: 2025-01-27
Payer: COMMERCIAL

## 2025-01-27 DIAGNOSIS — E55.9 VITAMIN D DEFICIENCY: Primary | ICD-10-CM

## 2025-01-27 NOTE — TELEPHONE ENCOUNTER
I spoke with her.  Her vitamin D level is low at 8.  She has been taking weekly vitamin D 50,000 IU over the past several months.  Since her level is still low on weekly vitamin D, I recommended that she start taking daily vitamin D3 over-the-counter 1000 to 2000 IU daily, in addition to her weekly vitamin D.  We will check this again in 2 months  All of her other labs including CRP and CBC were unremarkable

## 2025-01-29 DIAGNOSIS — R19.7 NAUSEA VOMITING AND DIARRHEA: ICD-10-CM

## 2025-01-29 DIAGNOSIS — R11.2 NAUSEA VOMITING AND DIARRHEA: ICD-10-CM

## 2025-01-29 RX ORDER — ONDANSETRON 8 MG/1
8 TABLET, ORALLY DISINTEGRATING ORAL EVERY 8 HOURS PRN
Qty: 30 TABLET | Refills: 1 | Status: SHIPPED | OUTPATIENT
Start: 2025-01-29

## 2025-01-30 ENCOUNTER — TELEPHONE (OUTPATIENT)
Dept: PRIMARY CARE | Facility: CLINIC | Age: 45
End: 2025-01-30
Payer: COMMERCIAL

## 2025-01-30 DIAGNOSIS — N76.0 ACUTE VAGINITIS: Primary | ICD-10-CM

## 2025-01-30 RX ORDER — FLUCONAZOLE 150 MG/1
150 TABLET ORAL ONCE
Qty: 1 TABLET | Refills: 0 | Status: SHIPPED | OUTPATIENT
Start: 2025-01-30 | End: 2025-01-30

## 2025-01-30 NOTE — ASSESSMENT & PLAN NOTE
Waning benefit from trazodone, to some extent likely r/t recent stressors and grief. Will gently titrate dose. Discussed indication(s), reviewed risks/benefits/alternatives.      Increase trazodone to 150-200 mg PO at bedtime.

## 2025-01-30 NOTE — TELEPHONE ENCOUNTER
I spoke with her and she is requesting a refill on Diflucan (for yeast infection due to recent Surfaxin use).  Sent to pharmacy

## 2025-01-30 NOTE — ASSESSMENT & PLAN NOTE
For now, will continue buspirone, desvenlafaxine and alprazolam, while re-titrating lamotrigine for mood. May consider further titration of buspirone should anxious sx persist or worsen.     Continue buspirone 5 mg PO BID (taking as single dose of 10 mg PO at bedtime)- refill sent to pharmacy today.   Continue desvenlafaxine ER 50 mg PO daily. -refill sent to pharmacy today.   Continue alprazolam PRN 0.5 mg PO PRN for severe anxiety/panic (short supply- 10 tabs per fill).   Annual UDS to be completed at follow-up.  Continue engagement in individual therapy.     Reviewed [place]AR[place], no discrepancies or concerns. Discussed risk of motor/cognitive impairment, sedation, dependence, tolerance, abuse, withdrawal sequelae, accidental overdose, life-threatening respiratory depression (bhavesh. in combination with alcohol and/or opioids), excess sedation in combination with other sedating medicines.  Noted that she was recently rx'd diazepam intravaginally for chronic pelvic pain.    From: Payal Lisa  To: Cristina Preciado  Sent: 11/18/2023 3:01 PM CST  Subject: Zolpidem 10mg    Hello. I am trying to refill my Zolpidem and didn't realize I had no refills. The pharmacy ([facility]) should be contacting you for authorization. I want to send a message as well. I will be out of Zolpidem on Tuesday. I apologize that I forgot to message for more refills.   If you can send the authorization over Monday I would really appreciate it.  Thank you,  Payal Lisa

## 2025-01-30 NOTE — ASSESSMENT & PLAN NOTE
Will re-titrate lamotrigine, given significant worsening of depressive sx over the past several months, complicated by grief over mother's recent death.     Start lamotrigine 25 mg PO daily x 2 weeks, then increase to 50 mg PO daily x 2 weeks, then increase to 100 mg PO daily x 2 weeks. Will likely further titrate at least to previously beneficial dose of 125 mg, but plan to assess at follow up.   Continue desvenlafaxine as noted above.   Continue individual therapy.     Advised of potentially fatal rash (i.e. Mcdowell-Luis Syndrome) in 1:10,000 individuals. Agrees to stop medication and seek medical attention immediately if rash or blistering of the mucosal surfaces develops; also discussed risk for benign drug-induced rash, and advised to cease medication and seek immediate medical attention to r/o SJS given its potential lethality. Further advised that the risk for this reaction increases if proper dose titration is not followed, and that after 2 days of non-adherence, re-titration is necessary.

## 2025-02-05 DIAGNOSIS — K58.0 IRRITABLE BOWEL SYNDROME WITH DIARRHEA: ICD-10-CM

## 2025-02-07 ENCOUNTER — APPOINTMENT (OUTPATIENT)
Dept: RADIOLOGY | Facility: CLINIC | Age: 45
End: 2025-02-07
Payer: COMMERCIAL

## 2025-02-10 ENCOUNTER — APPOINTMENT (OUTPATIENT)
Dept: RADIOLOGY | Facility: CLINIC | Age: 45
End: 2025-02-10
Payer: COMMERCIAL

## 2025-02-10 DIAGNOSIS — E55.9 VITAMIN D DEFICIENCY: ICD-10-CM

## 2025-02-10 RX ORDER — ERGOCALCIFEROL 1.25 MG/1
1 CAPSULE ORAL
Qty: 8 CAPSULE | Refills: 3 | Status: SHIPPED | OUTPATIENT
Start: 2025-02-10

## 2025-02-17 DIAGNOSIS — G89.29 CHRONIC NONINTRACTABLE HEADACHE, UNSPECIFIED HEADACHE TYPE: ICD-10-CM

## 2025-02-17 DIAGNOSIS — R51.9 CHRONIC NONINTRACTABLE HEADACHE, UNSPECIFIED HEADACHE TYPE: ICD-10-CM

## 2025-02-17 RX ORDER — AMITRIPTYLINE HYDROCHLORIDE 25 MG/1
25 TABLET, FILM COATED ORAL NIGHTLY
Qty: 90 TABLET | Refills: 2 | Status: SHIPPED | OUTPATIENT
Start: 2025-02-17

## 2025-02-18 DIAGNOSIS — K58.0 IRRITABLE BOWEL SYNDROME WITH DIARRHEA: ICD-10-CM

## 2025-02-24 ENCOUNTER — TELEMEDICINE (OUTPATIENT)
Dept: BEHAVIORAL HEALTH | Facility: CLINIC | Age: 45
End: 2025-02-24
Payer: COMMERCIAL

## 2025-02-24 DIAGNOSIS — F43.21 GRIEF: ICD-10-CM

## 2025-02-24 DIAGNOSIS — F41.1 GENERALIZED ANXIETY DISORDER: ICD-10-CM

## 2025-02-24 DIAGNOSIS — Z79.899 MEDICATION MANAGEMENT: ICD-10-CM

## 2025-02-24 DIAGNOSIS — F43.10 PTSD (POST-TRAUMATIC STRESS DISORDER): ICD-10-CM

## 2025-02-24 DIAGNOSIS — G47.09 OTHER INSOMNIA: ICD-10-CM

## 2025-02-24 DIAGNOSIS — Z00.00 HEALTHCARE MAINTENANCE: ICD-10-CM

## 2025-02-24 DIAGNOSIS — F33.1 MODERATE EPISODE OF RECURRENT MAJOR DEPRESSIVE DISORDER: ICD-10-CM

## 2025-02-24 PROCEDURE — 99214 OFFICE O/P EST MOD 30 MIN: CPT | Performed by: CLINICAL NURSE SPECIALIST

## 2025-02-24 RX ORDER — ALPRAZOLAM 0.5 MG/1
0.5 TABLET ORAL DAILY PRN
Qty: 10 TABLET | Refills: 1 | Status: SHIPPED | OUTPATIENT
Start: 2025-02-24
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
## 2025-02-24 NOTE — ASSESSMENT & PLAN NOTE
"Tolerating re-titration of lamotrigine, but reporting side effect of \"feeling weird\" and \"kind of like an out of body experience.\" Has not experienced similar side effects in the past, so mutually agreed to continue lamotrigine at current dose of 100 mg daily (started ~1 week ago) and monitor for improvement over time. Suspect this may be a transient side effect of restarting the medication.     Continue lamotrigine 100 mg PO daily- refill on file at pharmacy.   Continue desvenlafaxine as noted above.   Continue individual therapy.     Advised of potentially fatal rash (i.e. Mcdowell-Luis Syndrome) in 1:10,000 individuals. Agrees to stop medication and seek medical attention immediately if rash or blistering of the mucosal surfaces develops; also discussed risk for benign drug-induced rash, and advised to cease medication and seek immediate medical attention to r/o SJS given its potential lethality. Further advised that the risk for this reaction increases if proper dose titration is not followed, and that after 2 days of non-adherence, re-titration is necessary.   "

## 2025-02-24 NOTE — ASSESSMENT & PLAN NOTE
Tolerated titration of trazodone with good benefit. Will continue at current dose.     Continue trazodone 150-200 mg PO at bedtime- no refill needed today.

## 2025-02-24 NOTE — ASSESSMENT & PLAN NOTE
Overall, modest improvement in anxious sx since last visit. For now, will continue buspirone, desvenlafaxine and alprazolam, while re-titrating lamotrigine for mood. May consider further titration of buspirone should anxious sx persist or worsen.     Continue buspirone 5 mg PO BID (taking as single dose of 10 mg PO at bedtime)- no refill needed today.    Continue desvenlafaxine ER 50 mg PO daily. -no refill needed today.    Continue alprazolam PRN 0.5 mg PO PRN for severe anxiety/panic (short supply- 10 tabs per fill). -refill sent to pharmacy today.   Annual UDS to be completed at follow-up orders placed today.   Continue engagement in individual therapy.     Reviewed OARRS, no discrepancies or concerns. Discussed risk of motor/cognitive impairment, sedation, dependence, tolerance, abuse, withdrawal sequelae, accidental overdose, life-threatening respiratory depression (bhavesh. in combination with alcohol and/or opioids), excess sedation in combination with other sedating medicines.  Noted that she was recently rx'd diazepam intravaginally for chronic pelvic pain.

## 2025-02-26 ENCOUNTER — OFFICE VISIT (OUTPATIENT)
Dept: PRIMARY CARE | Facility: CLINIC | Age: 45
End: 2025-02-26
Payer: COMMERCIAL

## 2025-02-26 VITALS
TEMPERATURE: 97.4 F | SYSTOLIC BLOOD PRESSURE: 128 MMHG | DIASTOLIC BLOOD PRESSURE: 79 MMHG | HEART RATE: 76 BPM | OXYGEN SATURATION: 97 %

## 2025-02-26 DIAGNOSIS — S63.681S OTHER SPRAIN OF RIGHT THUMB, SEQUELA: Primary | ICD-10-CM

## 2025-02-26 PROCEDURE — 99213 OFFICE O/P EST LOW 20 MIN: CPT | Performed by: FAMILY MEDICINE

## 2025-02-26 ASSESSMENT — ENCOUNTER SYMPTOMS
ARTHRALGIAS: 1
PAIN: 1

## 2025-02-26 NOTE — PROGRESS NOTES
"Subjective   Patient ID: Jennifer Blackwood \"Janeth" is a 44 y.o. female who presents for No chief complaint on file..    Hand Injury     Pain  This is a new problem. The current episode started 1 to 4 weeks ago.      She is here today to follow-up on right thumb injury  This occurred approximately 1 month ago.  She put her hand out to stop her dog from coming in and jammed it.  She went to an urgent care afterwards.  She had an x-ray done on 1/30/2025 of her right hand which was negative.  She is still having pain since then.  It felt better after 1 week but now has been getting worse again.  Pain is most noticeable at the base of her thumb.  This is an 8 out of 10 intensity pain.  She does have paresthesias involving her 1st through 3rd fingers.  Has history of surgery for right carpal tunnel syndrome 1.5 to 2 years ago  She has been using a splint which helps with her pain      Review of Systems   Musculoskeletal:  Positive for arthralgias.       Objective   There were no vitals taken for this visit.    Physical Exam  Constitutional:       General: She is not in acute distress.     Appearance: Normal appearance. She is well-developed.   Pulmonary:      Effort: Pulmonary effort is normal.   Musculoskeletal:         General: Tenderness present.      Comments: Right hand: There is tenderness involving the ulnar side of the first MTP joint.  Pain with abduction of thumb.  Distal finger sensation intact.  Negative Tinel's at wrist   Skin:     Findings: No rash.   Neurological:      Mental Status: She is alert.   Psychiatric:         Mood and Affect: Mood normal.         Behavior: Behavior normal.         Assessment/Plan   Assessment & Plan  Other sprain of right thumb, sequela    Orders:    Referral to Orthopaedic Surgery; Future    Since this has not been improving after almost 1 month of conservative treatment including splinting, I would like to refer her to see orthopedics.  Given referral.  Recommended continuing " current management including RICE therapy and using thumb splint.  Follow-up if any persistent pain after seeing orthopedics

## 2025-02-26 NOTE — PROGRESS NOTES
"Subjective   Patient ID: Jennifer Blackwood \"Martina\" is a 44 y.o. female who presents for Hand Injury (Thumb ) and Other (PHQ2- ).    HPI     Review of Systems    Objective   /79   Pulse 76   Temp 36.3 °C (97.4 °F) (Temporal)   SpO2 97%     Physical Exam    Assessment/Plan   Assessment & Plan  Other sprain of right thumb, sequela    Orders:    Referral to Orthopaedic Surgery; Future       "

## 2025-02-27 ENCOUNTER — OFFICE VISIT (OUTPATIENT)
Dept: ORTHOPEDIC SURGERY | Facility: CLINIC | Age: 45
End: 2025-02-27
Payer: COMMERCIAL

## 2025-02-27 DIAGNOSIS — S63.681S OTHER SPRAIN OF RIGHT THUMB, SEQUELA: ICD-10-CM

## 2025-02-27 DIAGNOSIS — M18.11 ARTHRITIS OF CARPOMETACARPAL (CMC) JOINT OF RIGHT THUMB: Primary | ICD-10-CM

## 2025-02-27 RX ORDER — LIDOCAINE HYDROCHLORIDE 10 MG/ML
0.5 INJECTION, SOLUTION INFILTRATION; PERINEURAL
Status: COMPLETED | OUTPATIENT
Start: 2025-02-27 | End: 2025-02-27

## 2025-02-27 RX ORDER — MELOXICAM 15 MG/1
15 TABLET ORAL DAILY
Qty: 30 TABLET | Refills: 0 | Status: SHIPPED | OUTPATIENT
Start: 2025-02-27 | End: 2025-03-29

## 2025-02-27 RX ADMIN — LIDOCAINE HYDROCHLORIDE 0.5 ML: 10 INJECTION, SOLUTION INFILTRATION; PERINEURAL at 11:41

## 2025-02-27 NOTE — PROGRESS NOTES
History of Present Illness:  Presents for evaluation of right thumb.  Patient denies inciting trauma.  The pain is localized about the base of the thumb. It is described as moderate. The pain occurs intermittantly and is worse with pinching and gripping. The patient presents due to persistent symptoms even with activity modification.      Review of Systems   GENERAL: Negative for malaise, significant weight loss, fever  MUSCULOSKELETAL: see HPI  NEURO:  Negative    The patient's past medical history, family history, social history, and review of systems were reviewed. History is otherwise negative except as stated in the HPI.    Physical Examination:  General: Alert and oriented to person, place, and time.  No acute distress and breathing comfortably: Pleasant and cooperative with examination.  HEENT: Head is normocephalic and atraumatic.  Neck: Supple, no visible swelling.  Cardiovascular: No palpable tachycardia  Lungs: No audible wheezing or labored breathing  Abdomen: Nondistended.  On musculoskeletal examination, the patient has full elbow range of motion. In regards to the wrist, there is no obvious deformity. Range of motion is full in flexion, extension, pronation, and supination. Strength is 5/5 in flexion and extension. There is tenderness to palpation of the thumb CMC joint. Provocative testing with CMC grind is positive with crepitus. The thumb MP joint demonstrates no hyperextension instability. There is no tenderness to palpation about the 1st dorsal compartment, the SL interval, or the TFCC. Sensation and motor function are intact in the radial, ulnar, and median nerve distribution. There is no obvious thenar or intrinsic atrophy. All fingers are without triggering and are without pain over the A1 pulley. The patient can make a full composite fist. The hand itself is warm and well perfused. The skin is intact throughout. The contralateral hand and wrist are normal to inspection, range of motion,  stability, and strength.    S Inj/Asp: R thumb CMC on 2/27/2025 11:41 AM  Indications: pain  Details: 24 G needle, plantar approach  Medications: 5 mg triamcinolone acetonide 10 mg/mL; 0.5 mL lidocaine 10 mg/mL (1 %)  Outcome: tolerated well, no immediate complications  Procedure, treatment alternatives, risks and benefits explained, specific risks discussed. Consent was given by the patient. Immediately prior to procedure a time out was called to verify the correct patient, procedure, equipment, support staff and site/side marked as required. Patient was prepped and draped in the usual sterile fashion.             Assessment:  Patient with right thumb cmc arthritis. CSI and comfort cool provided today.     Plan:   Injection. I had a long discussion with the patient regarding the diagnosis of thumb CMC arthritis and the risks/benefits/expected outcomes of various treatment options. At this point, the patient elected non-operative treatment consisting of activity modification, intermittant NSAIDs (if not medically contraindicated), and/or thumb spica splinting. I also discussed the option of a corticosteroid injection. Specifically, I reviewed the risks of injection which include, but are not limited to, infection, bleeding, pain, steroid flare, glycemic alteration, subcutaneous fat atrophy, skin hypopigmentation, soft tissue damage, and incomplete symptom relief. The patient consented to the injection, and then using sterile technique, I injected a 1mL of Kenalog 40 into the thumb CMC joint. The injection site was dressed, and the patient tolerated the injection well. Finally, I have emphasized patience, as any benefit may take some time to manifest. Depending on the success of this non-operative course, I will see them back on an as needed basis.      Follow up:  3 months with XR NAYANA Torres MD  Orthopaedic Surgeon

## 2025-02-28 ENCOUNTER — DOCUMENTATION (OUTPATIENT)
Dept: ORTHOPEDIC SURGERY | Facility: CLINIC | Age: 45
End: 2025-02-28
Payer: COMMERCIAL

## 2025-02-28 ENCOUNTER — APPOINTMENT (OUTPATIENT)
Dept: ORTHOPEDIC SURGERY | Facility: CLINIC | Age: 45
End: 2025-02-28
Payer: COMMERCIAL

## 2025-02-28 ENCOUNTER — TELEPHONE (OUTPATIENT)
Dept: ORTHOPEDIC SURGERY | Facility: CLINIC | Age: 45
End: 2025-02-28
Payer: COMMERCIAL

## 2025-02-28 NOTE — PROGRESS NOTES
02/28/25  Pt came to [facility] to exchange sizes of cool comfort brace.  I applied the large, and although there was an excess amount of room compared to the medium she felt was too tight, she was more comfortable and opted to stay w/ the large.    present

## 2025-02-28 NOTE — TELEPHONE ENCOUNTER
02/28/25  Recd message from pt through [facility] that the brace she was fit with yesterday is too small and she would like a larger size.  Scheduled for later this morning in [facility] to exchange.

## 2025-03-04 DIAGNOSIS — K58.0 IRRITABLE BOWEL SYNDROME WITH DIARRHEA: ICD-10-CM

## 2025-03-18 NOTE — PROGRESS NOTES
Jennifer Blackwood is a 44 y.o. female with past medical history of HTN, adjustment disorder, PTSD, chronic pain disorder, hx cholecystectomy, and GERD who is referred by PCP Dr. Jean Roman for IBS-D. Previously followed with Clarisa Espino NP. Chronic diarrhea with abdominal pain since childhood. When she was 15 she was told she had ulcerative colitis. Then later told she did not have UC. Was on high dose NSAIDS at that time for years.    10/2021 EGD/colonoscopy. EGD with 2 cm hiatal hernia. No evidence of celiac disease. Colonoscopy with small tubular adenomas, sigmoid diverticulosis, external hemorrhoids. Path consistent with lymphocytic colitis. Treated with budesonide. Required multiple rounds. Improved some but continued to have diarrhea episodes, nausea (about 6 BM per day). Has taken xifaxin course in the past which helped.    Today she states she has had diarrhea flare since November after she had increase in stress. Currently having 12-20 loose stools per day with nocturnal awakening. Lots of nausea. On omeprazole for years. Also takes amitriptyline and baclofen for chronic pelvic pain/hypersensitivity. No NSAIDS. Has been taking Imodium as needed but sometimes this worsens her cramping.    Family history:  Brother with Crohn's disease. Father with celiac disease. Mother, sister with IBS. No family history of colon cancer.     Past Medical History:   Diagnosis Date    Adjustment disorder 2000    Allergic 1995    Anxiety 1997    Chronic pain disorder 1/11/11    Depression 1997    Eczema 1991    GERD (gastroesophageal reflux disease) 1997    Headache     Liver disease 2022    Other abnormal auditory perceptions, unspecified ear 01/19/2018    Abnormal auditory perception    Other bursitis of knee, unspecified knee     Pes anserine bursitis    Panic attack     Panic disorder 1997    PTSD (post-traumatic stress disorder) 1/11/11    Sleep difficulties     Urinary tract infection 1997    Varicella 1986     Past  Surgical History:   Procedure Laterality Date    CHOLECYSTECTOMY  2022    ENDOMETRIAL ABLATION  2009    HYSTERECTOMY  1/11/11    OTHER SURGICAL HISTORY  12/22/2022    Cystoscopy    OTHER SURGICAL HISTORY  12/22/2022    Vaginal lesion ablation    OTHER SURGICAL HISTORY  09/15/2022    Colonoscopy    OTHER SURGICAL HISTORY  09/15/2022    Esophagogastroduodenoscopy    OTHER SURGICAL HISTORY  02/16/2022    Cholecystectomy laparoscopic    OTHER SURGICAL HISTORY  06/14/2019    Tubal ligation bilateral    OTHER SURGICAL HISTORY  06/14/2019    Hysterectomy    TUBAL LIGATION      WISDOM TOOTH EXTRACTION  2000     Current Outpatient Medications   Medication Sig Dispense Refill    ALPRAZolam (Xanax) 0.5 mg tablet Take 1 tablet (0.5 mg) by mouth once daily as needed for anxiety. 10 tablet 1    amitriptyline (Elavil) 25 mg tablet Take 1 tablet (25 mg) by mouth once daily at bedtime. 90 tablet 2    aspirin-acetaminophen-caffeine (Excedrin Migraine) 250-250-65 mg tablet Take 1 tablet by mouth every 6 hours if needed for headaches.      baclofen (Lioresal) 10 mg tablet 1 tablet (10 mg) every 8 hours if needed.      busPIRone (Buspar) 5 mg tablet Take 1 tablet (5 mg) by mouth once daily AND 2 tablets (10 mg) once daily at bedtime. 90 tablet 2    clobetasoL-calcipotriene 0.05-0.005 % solution APPLY AND GENTLY MASSAGE INTO AFFECTED AREA(S) ONCE DAILY      desvenlafaxine 50 mg 24 hr tablet Take 1 tablet (50 mg) by mouth once daily. 30 tablet 2    ergocalciferol (Vitamin D-2) 1.25 MG (24339 UT) capsule Take 1 capsule (1,250 mcg) by mouth 1 (one) time per week. 8 capsule 3    estradiol (Vivelle-DOT) 0.075 mg/24 hr patch APPLY 1 PATCH TOPICALLY TO THE SKIN EVERY 3 AND 1/2 DAYS      gabapentin (Neurontin) 400 mg capsule Take 3 capsules (1,200 mg) by mouth once daily at bedtime. 90 capsule 5    GAS RELIEF, SIMETHICONE, ORAL       lamoTRIgine (LaMICtal) 100 mg tablet Take 1 tablet (100 mg) by mouth once daily. 30 tablet 2    lidocaine  "(Lidoderm) 5 % patch Place 1 patch on the skin once daily.      omeprazole (PriLOSEC) 40 mg DR capsule Take 1 capsule (40 mg) by mouth once daily in the morning. Take before meals. 1/2 hr prior to breakfast      ondansetron ODT (Zofran-ODT) 8 mg disintegrating tablet Dissolve 1 tablet (8 mg) in the mouth every 8 hours if needed for nausea or vomiting. 30 tablet 1    oxyCODONE (Roxicodone) 5 mg immediate release tablet TAKE 1 TABLET BY MOUTH EVERY 6 HOURS FOR UP TO 3 DAYS AS NEEDED FOR PAIN      solifenacin (VESIcare) 10 mg tablet Take 1 tablet (10 mg) by mouth once daily.      traZODone (Desyrel) 100 mg tablet Take 1.5-2 tablets (150-200 mg) by mouth once daily at bedtime. 60 tablet 2    meloxicam (Mobic) 15 mg tablet Take 1 tablet (15 mg) by mouth once daily. (Patient not taking: Reported on 3/28/2025) 30 tablet 0     No current facility-administered medications for this visit.         Review of Systems  Review of Systems negative except as noted in HPI.    Objective     /71   Pulse 74   Temp 36.8 °C (98.2 °F)   Ht 1.651 m (5' 5\")   Wt 77.6 kg (171 lb)   BMI 28.46 kg/m²      Physical Exam  Constitutional:  No acute distress. Normal appearance. Not ill-appearing.  HENT:  Head normocephalic and atraumatic. Conjunctivae normal.  Cardiovascular:  Normal rate. Regular rhythm.  Pulmonary:  Pulmonary effort normal. No respiratory distress. Breath sounds clear.  Abdominal:  Abdomen is soft. There is no distension. Lower abdominal tenderness with palpation. No rebound or guarding.  Skin: Dry.  Neurological:  Alert and oriented.  Psychiatric:  Mood and affect normal.    Assessment/Plan     44 y.o. female with history of HTN, adjustment disorder, PTSD, chronic pain disorder, cholecystectomy, GERD, IBS-D, and hx of lymphocytic colitis who presents today for clinic visit for worsening of diarrhea. She has had flare of diarrhea with nocturnal awakening and nausea since November. We discussed obtaining stool studies " and repeat colonoscopy to evaluate. In the meantime she will complete another course of xifaxin to see if this helps her symptoms.    Recommendations  Trial another course of xifaxin.  Continue Imodium as needed.  Obtain stool tests.  Schedule colonoscopy with Miralax prep.  Follow up 2 weeks after procedure.    Electronically signed by: Estee Flores CNP on 3/28/2025 at 9:08 AM

## 2025-03-21 DIAGNOSIS — K58.0 IRRITABLE BOWEL SYNDROME WITH DIARRHEA: ICD-10-CM

## 2025-03-24 ENCOUNTER — TELEMEDICINE (OUTPATIENT)
Dept: BEHAVIORAL HEALTH | Facility: CLINIC | Age: 45
End: 2025-03-24
Payer: COMMERCIAL

## 2025-03-24 DIAGNOSIS — F43.10 PTSD (POST-TRAUMATIC STRESS DISORDER): ICD-10-CM

## 2025-03-24 DIAGNOSIS — F43.21 GRIEF: ICD-10-CM

## 2025-03-24 DIAGNOSIS — F41.1 GENERALIZED ANXIETY DISORDER: ICD-10-CM

## 2025-03-24 DIAGNOSIS — F33.1 MODERATE EPISODE OF RECURRENT MAJOR DEPRESSIVE DISORDER: ICD-10-CM

## 2025-03-24 DIAGNOSIS — G47.09 OTHER INSOMNIA: ICD-10-CM

## 2025-03-24 PROCEDURE — 99214 OFFICE O/P EST MOD 30 MIN: CPT | Performed by: CLINICAL NURSE SPECIALIST

## 2025-03-24 RX ORDER — TRAZODONE HYDROCHLORIDE 100 MG/1
150-200 TABLET ORAL NIGHTLY
Qty: 60 TABLET | Refills: 2 | Status: SHIPPED | OUTPATIENT
Start: 2025-03-24 | End: 2025-06-22

## 2025-03-24 RX ORDER — LAMOTRIGINE 100 MG/1
100 TABLET ORAL DAILY
Qty: 30 TABLET | Refills: 2 | Status: SHIPPED | OUTPATIENT
Start: 2025-03-24 | End: 2025-06-22

## 2025-03-24 RX ORDER — BUSPIRONE HYDROCHLORIDE 5 MG/1
TABLET ORAL
Qty: 90 TABLET | Refills: 2 | Status: SHIPPED | OUTPATIENT
Start: 2025-03-24 | End: 2025-06-22

## 2025-03-24 RX ORDER — DESVENLAFAXINE 50 MG/1
50 TABLET, EXTENDED RELEASE ORAL DAILY
Qty: 30 TABLET | Refills: 2 | Status: SHIPPED | OUTPATIENT
Start: 2025-03-24
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
## 2025-03-27 DIAGNOSIS — M18.11 ARTHRITIS OF CARPOMETACARPAL (CMC) JOINT OF RIGHT THUMB: ICD-10-CM

## 2025-03-27 DIAGNOSIS — E55.9 VITAMIN D DEFICIENCY: ICD-10-CM

## 2025-03-28 ENCOUNTER — OFFICE VISIT (OUTPATIENT)
Dept: GASTROENTEROLOGY | Facility: CLINIC | Age: 45
End: 2025-03-28
Payer: COMMERCIAL

## 2025-03-28 VITALS
BODY MASS INDEX: 28.49 KG/M2 | HEIGHT: 65 IN | DIASTOLIC BLOOD PRESSURE: 71 MMHG | SYSTOLIC BLOOD PRESSURE: 122 MMHG | TEMPERATURE: 98.2 F | HEART RATE: 74 BPM | WEIGHT: 171 LBS

## 2025-03-28 DIAGNOSIS — K58.0 IRRITABLE BOWEL SYNDROME WITH DIARRHEA: ICD-10-CM

## 2025-03-28 DIAGNOSIS — Z86.0100 HISTORY OF COLON POLYPS: ICD-10-CM

## 2025-03-28 DIAGNOSIS — R19.7 DIARRHEA, UNSPECIFIED TYPE: ICD-10-CM

## 2025-03-28 DIAGNOSIS — R10.30 LOWER ABDOMINAL PAIN: ICD-10-CM

## 2025-03-28 DIAGNOSIS — Z87.19 HISTORY OF COLITIS: Primary | ICD-10-CM

## 2025-03-28 PROCEDURE — 3008F BODY MASS INDEX DOCD: CPT | Performed by: NURSE PRACTITIONER

## 2025-03-28 PROCEDURE — 99204 OFFICE O/P NEW MOD 45 MIN: CPT | Performed by: NURSE PRACTITIONER

## 2025-03-28 PROCEDURE — 99214 OFFICE O/P EST MOD 30 MIN: CPT | Performed by: NURSE PRACTITIONER

## 2025-03-28 RX ORDER — MELOXICAM 15 MG/1
15 TABLET ORAL DAILY
Qty: 30 TABLET | Refills: 0 | Status: SHIPPED | OUTPATIENT
Start: 2025-03-28

## 2025-03-28 ASSESSMENT — PAIN SCALES - GENERAL: PAINLEVEL_OUTOF10: 0-NO PAIN

## 2025-03-28 NOTE — PATIENT INSTRUCTIONS
Recommendations  Trial another course of xifaxin.  Obtain stool tests.  Schedule colonoscopy. You can call [phone] to schedule. Make sure to ask for Miralax prep instructions.  Follow up 2 weeks after procedure.

## 2025-04-01 DIAGNOSIS — R11.2 NAUSEA VOMITING AND DIARRHEA: ICD-10-CM

## 2025-04-01 DIAGNOSIS — R19.7 NAUSEA VOMITING AND DIARRHEA: ICD-10-CM

## 2025-04-01 RX ORDER — ONDANSETRON 8 MG/1
8 TABLET, ORALLY DISINTEGRATING ORAL EVERY 8 HOURS PRN
Qty: 30 TABLET | Refills: 1 | Status: SHIPPED | OUTPATIENT
Start: 2025-04-01

## 2025-04-01 NOTE — ASSESSMENT & PLAN NOTE
Tolerating and benefiting from current regimen. No indication for medication changes today.      Continue trazodone 150-200 mg PO at bedtime- refill sent to pharmacy today.

## 2025-04-01 NOTE — ASSESSMENT & PLAN NOTE
"Tolerated re-titration of lamotrigine, and no longer experiencing previously reported side effects(\"feeling weird\" and \"kind of like an out of body experience\"). No further medication changes today.     Continue lamotrigine 100 mg PO daily- refill sent to pharmacy today.   Continue desvenlafaxine as noted above.   Continue individual therapy.     Advised of potentially fatal rash (i.e. Mcdowell-Luis Syndrome) in 1:10,000 individuals. Agrees to stop medication and seek medical attention immediately if rash or blistering of the mucosal surfaces develops; also discussed risk for benign drug-induced rash, and advised to cease medication and seek immediate medical attention to r/o SJS given its potential lethality. Further advised that the risk for this reaction increases if proper dose titration is not followed, and that after 2 days of non-adherence, re-titration is necessary.   "

## 2025-04-01 NOTE — ASSESSMENT & PLAN NOTE
Anxious sx have improved over the past several months, but still endorsing excessive anxiety most days. Mutually agreed to titration of buspirone dose. Discussed indication(s), reviewed risks/benefits/alternatives.   Will otherwise continue desvenlafaxine and alprazolam PRN.     Increase buspirone to  5 mg PO daily and 10 mg PO at bedtime.  Continue desvenlafaxine ER 50 mg PO daily. -refill sent to pharmacy today.     Continue alprazolam PRN 0.5 mg PO PRN for severe anxiety/panic (short supply- 10 tabs per fill). -no refill needed today.    Orders placed for annual UDS- has not yet completed.   Continue engagement in individual therapy.     Reviewed OARRS, no discrepancies or concerns. Discussed risk of motor/cognitive impairment, sedation, dependence, tolerance, abuse, withdrawal sequelae, accidental overdose, life-threatening respiratory depression (bhavesh. in combination with alcohol and/or opioids), excess sedation in combination with other sedating medicines.  Noted that she was recently rx'd diazepam intravaginally for chronic pelvic pain.

## 2025-04-01 NOTE — ASSESSMENT & PLAN NOTE
Aware that I can assist with resources should she wish to pursue grief therapy.  Continue individual therapy.

## 2025-04-07 DIAGNOSIS — Z12.11 COLON CANCER SCREENING: ICD-10-CM

## 2025-04-08 DIAGNOSIS — R11.2 NAUSEA VOMITING AND DIARRHEA: ICD-10-CM

## 2025-04-08 DIAGNOSIS — R19.7 NAUSEA VOMITING AND DIARRHEA: ICD-10-CM

## 2025-04-08 RX ORDER — ONDANSETRON 8 MG/1
8 TABLET, ORALLY DISINTEGRATING ORAL EVERY 8 HOURS PRN
Qty: 30 TABLET | Refills: 1 | Status: SHIPPED | OUTPATIENT
Start: 2025-04-08

## 2025-04-08 RX ORDER — SODIUM, POTASSIUM,MAG SULFATES 17.5-3.13G
SOLUTION, RECONSTITUTED, ORAL ORAL
Qty: 2 EACH | Refills: 0 | Status: SHIPPED | OUTPATIENT
Start: 2025-04-08

## 2025-04-21 ENCOUNTER — TELEMEDICINE (OUTPATIENT)
Dept: BEHAVIORAL HEALTH | Facility: CLINIC | Age: 45
End: 2025-04-21
Payer: COMMERCIAL

## 2025-04-21 DIAGNOSIS — F33.1 MODERATE EPISODE OF RECURRENT MAJOR DEPRESSIVE DISORDER: ICD-10-CM

## 2025-04-21 DIAGNOSIS — F43.10 PTSD (POST-TRAUMATIC STRESS DISORDER): ICD-10-CM

## 2025-04-21 DIAGNOSIS — F41.1 GENERALIZED ANXIETY DISORDER: ICD-10-CM

## 2025-04-21 DIAGNOSIS — G47.09 OTHER INSOMNIA: ICD-10-CM

## 2025-04-21 PROCEDURE — 99213 OFFICE O/P EST LOW 20 MIN: CPT | Performed by: CLINICAL NURSE SPECIALIST

## 2025-04-21 NOTE — ASSESSMENT & PLAN NOTE
Tolerating and benefiting from current regimen. No indication for medication changes today.      Continue trazodone 150-200 mg PO at bedtime- no refill needed today.

## 2025-04-21 NOTE — ASSESSMENT & PLAN NOTE
Tolerating and benefiting from current regimen. No indication for medication changes today.      Continue lamotrigine 100 mg PO daily- no refill needed today.    Continue desvenlafaxine as noted above.   Continue individual therapy.     Advised of potentially fatal rash (i.e. Mcdowell-Luis Syndrome) in 1:10,000 individuals. Agrees to stop medication and seek medical attention immediately if rash or blistering of the mucosal surfaces develops; also discussed risk for benign drug-induced rash, and advised to cease medication and seek immediate medical attention to r/o SJS given its potential lethality. Further advised that the risk for this reaction increases if proper dose titration is not followed, and that after 2 days of non-adherence, re-titration is necessary.

## 2025-04-21 NOTE — ASSESSMENT & PLAN NOTE
Tolerated titration of buspirone dose with some emerging benefit for anxious/panic sx. Will continue current regimen without changes today.     Continue buspirone 5 mg PO daily and 10 mg PO at bedtime. -no refill needed today.    Continue desvenlafaxine ER 50 mg PO daily. -no refill needed today.      Continue alprazolam PRN 0.5 mg PO PRN for severe anxiety/panic (short supply- 10 tabs per fill). -no refill needed today.    Orders placed for annual UDS- has not yet completed.   Continue engagement in individual therapy.     Reviewed OARRS, no discrepancies or concerns. Discussed risk of motor/cognitive impairment, sedation, dependence, tolerance, abuse, withdrawal sequelae, accidental overdose, life-threatening respiratory depression (bhavesh. in combination with alcohol and/or opioids), excess sedation in combination with other sedating medicines.  Noted that she was recently rx'd diazepam intravaginally for chronic pelvic pain.

## 2025-04-21 NOTE — PROGRESS NOTES
"Outpatient Psychiatry      Subjective   Jennifer KAILA Jaison \"Martina\", is a 44 y.o. female,  seen in follow-up.        Assessment/Plan   Problem List Items Addressed This Visit           ICD-10-CM    Generalized anxiety disorder F41.1    Tolerated titration of buspirone dose with some emerging benefit for anxious/panic sx. Will continue current regimen without changes today.     Continue buspirone 5 mg PO daily and 10 mg PO at bedtime. -no refill needed today.    Continue desvenlafaxine ER 50 mg PO daily. -no refill needed today.      Continue alprazolam PRN 0.5 mg PO PRN for severe anxiety/panic (short supply- 10 tabs per fill). -no refill needed today.    Orders placed for annual UDS- has not yet completed.   Continue engagement in individual therapy.     Reviewed OARRS, no discrepancies or concerns. Discussed risk of motor/cognitive impairment, sedation, dependence, tolerance, abuse, withdrawal sequelae, accidental overdose, life-threatening respiratory depression (bhavesh. in combination with alcohol and/or opioids), excess sedation in combination with other sedating medicines.  Noted that she was recently rx'd diazepam intravaginally for chronic pelvic pain.          Relevant Orders    Follow Up In Psychiatry    PTSD (post-traumatic stress disorder) F43.10    Plan as noted above.          Relevant Orders    Follow Up In Psychiatry    Insomnia G47.00    Tolerating and benefiting from current regimen. No indication for medication changes today.      Continue trazodone 150-200 mg PO at bedtime- no refill needed today.           Relevant Orders    Follow Up In Psychiatry    Moderate episode of recurrent major depressive disorder F33.1    Tolerating and benefiting from current regimen. No indication for medication changes today.      Continue lamotrigine 100 mg PO daily- no refill needed today.    Continue desvenlafaxine as noted above.   Continue individual therapy.     Advised of potentially fatal rash (i.e. " Mcdowell-Luis Syndrome) in 1:10,000 individuals. Agrees to stop medication and seek medical attention immediately if rash or blistering of the mucosal surfaces develops; also discussed risk for benign drug-induced rash, and advised to cease medication and seek immediate medical attention to r/o SJS given its potential lethality. Further advised that the risk for this reaction increases if proper dose titration is not followed, and that after 2 days of non-adherence, re-titration is necessary.          Relevant Orders    Follow Up In Psychiatry       Follow-up in 4-6 weeks     Risk Assessment:  Risk Assessment: Jennifer Blackwood is currently a low acute risk of suicide and self-harm due to no past suicide attempt(s) and not currently endorsing thoughts of suicide. Jennifer Blackwood is currently a low acute risk of violence and harm to others due to no past history of violence and not currently threatening others.  Suicidal Risk Factors: , history of trauma/abuse, chronic medical illness, current psychiatric illness, and panic attacks  Violence Risk Factors: none  Protective Factors: strong coping skills, sense of responsibility towards family, social support/connectedness, child related concerns/living with child <18 years, positive family relationships, hopefulness/future orientation, and marriage/partnership     Provided with crisis/emergency resources, including the [facility] Crisis Hotline 1-803.441.3905, Crisis Text Line (text 2OVAW iw 495084) and the National Suicide Prevention Lifeline hotline 1-447.295.9598. Agrees to call 988 or go to the nearest emergency department if she feels unsafe, or has suicidal thinking with a plan or intent.       Reason for Visit:  Follow-up for medication management.     HPI:  Since her last visit,     Today has been a rough day.  Son was driving back to college last night, and ran into traffic, then was very anxious about deer on the road so asked her to  "stay on the phone with him until he got back to his place.   Didn't get to bed until after 2 AM.     Yesterday was also busy.   Took a small road trip with  to pick daughter up from staying with family for the weekend.   Was pretty tired by the time they got home, so being up late on top of that has left her feeling exhausted today.     \"...but overall I've been feeling better.\"  Working with individual therapist on anxiety, grief, and also on improving sleep routines.  Energy level has been a little bit better.  No significant changes in weight/appetite.   Denies significant depressive sx otherwise.     Tolerated increase in buspirone.   Some initial drowsiness that has since improved.   Mood has been less irritable.  Hasn't been experiencing panic attacks in the past month  With time, finds she's also having less worry over possible future panic attacks, and that has a positive impact on her mood.   Excessive anxiety has still been apparent most days, and can be disruptive, but not to the extreme that panic sx were.     Denies suicidal ideation or a passive death wish.     No new medications or health problems.       Current Psychiatric Medications:  Desvenlafaxine 50 mg PO daily  Buspirone 5 mg PO daily and 10 mg PO at bedtime- taking (2) 5 mg tabs at bedtime  Lamotrigine 100 mg PO daily  Trazodone 150-200 mg PO at bedtime PRN for sleep  Alprazolam 0.5 mg PO PRN for severe anxiety      Record Review: brief     Medical Review Of Systems:  Pertinent items are noted in HPI.    Psychiatric Review Of Systems:  As noted in HPI.        Objective   Mental Status Exam  General Appearance: Well groomed, appropriate eye contact  Attitude/Behavior: Cooperative  Motor: No psychomotor agitation or retardation, no tremor or other abnormal movements  Speech: Normal rate, volume, prosody  Mood: \"Better.\"  Affect: Congruent with mood and topic of conversation  Thought Process: Linear, goal directed  Thought Associations: No " loosening of associations  Thought Content: Other: (comment) (overall fewer anxious themes.)  Perception: No perceptual abnormalities noted  Sensorium: Alert and oriented to person, place, time and situation  Insight: Intact  Judgement: Intact  Cognition: Cognitively intact to conversational testing with respect to attention, orientation, fund of knowledge, recent and remote memory, and language    Vitals:  There were no vitals filed for this visit.    Labs:  not applicable        Allyn Acosta, APRN-CNP, APRN-CNS

## 2025-04-23 ENCOUNTER — CLINICAL SUPPORT (OUTPATIENT)
Dept: PREADMISSION TESTING | Facility: HOSPITAL | Age: 45
End: 2025-04-23
Payer: COMMERCIAL

## 2025-04-23 VITALS — BODY MASS INDEX: 28.49 KG/M2 | WEIGHT: 171 LBS | HEIGHT: 65 IN

## 2025-04-23 RX ORDER — DICYCLOMINE HYDROCHLORIDE 10 MG/1
10 CAPSULE ORAL 4 TIMES DAILY PRN
COMMUNITY

## 2025-04-23 RX ORDER — ACETAMINOPHEN 500 MG
1000 TABLET ORAL EVERY 6 HOURS PRN
COMMUNITY

## 2025-04-23 NOTE — PREPROCEDURE INSTRUCTIONS

## 2025-05-01 ENCOUNTER — APPOINTMENT (OUTPATIENT)
Dept: ORTHOPEDIC SURGERY | Facility: CLINIC | Age: 45
End: 2025-05-01
Payer: COMMERCIAL

## 2025-05-05 DIAGNOSIS — F41.1 GENERALIZED ANXIETY DISORDER: ICD-10-CM

## 2025-05-05 DIAGNOSIS — F43.10 PTSD (POST-TRAUMATIC STRESS DISORDER): ICD-10-CM

## 2025-05-05 RX ORDER — ALPRAZOLAM 0.5 MG/1
0.5 TABLET ORAL DAILY PRN
Qty: 10 TABLET | Refills: 1 | Status: SHIPPED | OUTPATIENT
Start: 2025-05-05

## 2025-05-08 ENCOUNTER — ANESTHESIA (OUTPATIENT)
Dept: GASTROENTEROLOGY | Facility: HOSPITAL | Age: 45
End: 2025-05-08
Payer: COMMERCIAL

## 2025-05-08 ENCOUNTER — ANESTHESIA EVENT (OUTPATIENT)
Dept: GASTROENTEROLOGY | Facility: HOSPITAL | Age: 45
End: 2025-05-08
Payer: COMMERCIAL

## 2025-05-08 ENCOUNTER — HOSPITAL ENCOUNTER (OUTPATIENT)
Dept: GASTROENTEROLOGY | Facility: HOSPITAL | Age: 45
Discharge: HOME | End: 2025-05-08
Payer: COMMERCIAL

## 2025-05-08 VITALS
TEMPERATURE: 96.8 F | DIASTOLIC BLOOD PRESSURE: 76 MMHG | HEIGHT: 65 IN | BODY MASS INDEX: 27.25 KG/M2 | RESPIRATION RATE: 18 BRPM | WEIGHT: 163.58 LBS | OXYGEN SATURATION: 99 % | SYSTOLIC BLOOD PRESSURE: 118 MMHG | HEART RATE: 82 BPM

## 2025-05-08 DIAGNOSIS — Z86.0100 HISTORY OF COLON POLYPS: ICD-10-CM

## 2025-05-08 DIAGNOSIS — Z87.19 HISTORY OF COLITIS: ICD-10-CM

## 2025-05-08 DIAGNOSIS — R19.7 DIARRHEA, UNSPECIFIED TYPE: ICD-10-CM

## 2025-05-08 DIAGNOSIS — R10.30 LOWER ABDOMINAL PAIN: ICD-10-CM

## 2025-05-08 PROCEDURE — 3700000002 HC GENERAL ANESTHESIA TIME - EACH INCREMENTAL 1 MINUTE

## 2025-05-08 PROCEDURE — 3700000001 HC GENERAL ANESTHESIA TIME - INITIAL BASE CHARGE

## 2025-05-08 PROCEDURE — 2500000004 HC RX 250 GENERAL PHARMACY W/ HCPCS (ALT 636 FOR OP/ED): Mod: JZ | Performed by: NURSE ANESTHETIST, CERTIFIED REGISTERED

## 2025-05-08 PROCEDURE — 7100000009 HC PHASE TWO TIME - INITIAL BASE CHARGE

## 2025-05-08 PROCEDURE — 7100000010 HC PHASE TWO TIME - EACH INCREMENTAL 1 MINUTE

## 2025-05-08 RX ORDER — PROPOFOL 10 MG/ML
INJECTION, EMULSION INTRAVENOUS AS NEEDED
Status: DISCONTINUED | OUTPATIENT
Start: 2025-05-08 | End: 2025-05-08

## 2025-05-08 RX ORDER — SODIUM CHLORIDE, SODIUM LACTATE, POTASSIUM CHLORIDE, CALCIUM CHLORIDE 600; 310; 30; 20 MG/100ML; MG/100ML; MG/100ML; MG/100ML
INJECTION, SOLUTION INTRAVENOUS CONTINUOUS PRN
Status: DISCONTINUED | OUTPATIENT
Start: 2025-05-08 | End: 2025-05-08

## 2025-05-08 RX ADMIN — SODIUM CHLORIDE, POTASSIUM CHLORIDE, SODIUM LACTATE AND CALCIUM CHLORIDE: 600; 310; 30; 20 INJECTION, SOLUTION INTRAVENOUS at 13:44

## 2025-05-08 RX ADMIN — PROPOFOL 50 MG: 10 INJECTION, EMULSION INTRAVENOUS at 13:55

## 2025-05-08 RX ADMIN — PROPOFOL 50 MG: 10 INJECTION, EMULSION INTRAVENOUS at 13:59

## 2025-05-08 RX ADMIN — PROPOFOL 50 MG: 10 INJECTION, EMULSION INTRAVENOUS at 13:52

## 2025-05-08 RX ADMIN — PROPOFOL 100 MG: 10 INJECTION, EMULSION INTRAVENOUS at 13:47

## 2025-05-08 SDOH — HEALTH STABILITY: MENTAL HEALTH: CURRENT SMOKER: 0

## 2025-05-08 ASSESSMENT — COLUMBIA-SUICIDE SEVERITY RATING SCALE - C-SSRS
6. HAVE YOU EVER DONE ANYTHING, STARTED TO DO ANYTHING, OR PREPARED TO DO ANYTHING TO END YOUR LIFE?: NO
2. HAVE YOU ACTUALLY HAD ANY THOUGHTS OF KILLING YOURSELF?: NO
1. IN THE PAST MONTH, HAVE YOU WISHED YOU WERE DEAD OR WISHED YOU COULD GO TO SLEEP AND NOT WAKE UP?: NO

## 2025-05-08 ASSESSMENT — PAIN SCALES - GENERAL
PAINLEVEL_OUTOF10: 0 - NO PAIN

## 2025-05-08 ASSESSMENT — PAIN - FUNCTIONAL ASSESSMENT: PAIN_FUNCTIONAL_ASSESSMENT: 0-10

## 2025-05-08 NOTE — ANESTHESIA POSTPROCEDURE EVALUATION
"Patient: Jennifer Blackwood \"Martina\"    Procedure Summary       Date: 05/08/25 Room / Location: [facility] [place]    Anesthesia Start: 1344 Anesthesia Stop:     Procedure: COLONOSCOPY Diagnosis:       Diarrhea, unspecified type      History of colitis      Lower abdominal pain      History of colon polyps    Scheduled Providers: Nicole Hill MD; Jean Angel DO Responsible Provider: Jean Angel DO    Anesthesia Type: MAC ASA Status: 3            Anesthesia Type: MAC    Vitals Value Taken Time   /67 05/08/25 14:10   Temp 36 05/08/25 14:10   Pulse 69 05/08/25 14:10   Resp 20 05/08/25 14:10   SpO2 100 05/08/25 14:10       Anesthesia Post Evaluation    Patient location during evaluation: bedside  Patient participation: complete - patient cannot participate  Level of consciousness: awake  Pain management: adequate  Airway patency: patent  Cardiovascular status: acceptable  Respiratory status: acceptable  Hydration status: acceptable  Postoperative Nausea and Vomiting: none        No notable events documented.    "

## 2025-05-08 NOTE — ANESTHESIA PREPROCEDURE EVALUATION
Jennifer Blackwood is a 44 y.o. female here for:    Colonoscopy  With Nicole Hill MD  Diarrhea, unspecified type  History of colitis  Lower abdominal pain  History of colon polyps    Relevant Problems   Cardiac   (+) Mastodynia      Neuro   (+) Anxiety   (+) Anxiety and depression   (+) Bipolar II disorder (Multi)   (+) Carpal tunnel syndrome   (+) Carpal tunnel syndrome, left   (+) Depression with anxiety   (+) Generalized anxiety disorder   (+) Mixed anxiety depressive disorder   (+) Moderate episode of recurrent major depressive disorder   (+) Other specified anxiety disorders   (+) PTSD (post-traumatic stress disorder)   (+) Polyneuropathy, unspecified   (+) Radiculopathy, lumbar region      GI   (+) Chronic diarrhea   (+) GERD (gastroesophageal reflux disease)   (+) Gastroesophageal reflux disease   (+) Irritable bowel syndrome with diarrhea      Liver   (+) Fatty liver   (+) Steatosis of liver      Musculoskeletal   (+) Carpal tunnel syndrome   (+) Carpal tunnel syndrome, left   (+) Interstitial cystitis (chronic) without hematuria   (+) Scoliosis      HEENT   (+) Unspecified hearing loss, unspecified ear      ID   (+) Antibiotic-induced yeast infection   (+) Infection of skin   (+) Upper respiratory tract infection      Skin   (+) Atopic dermatitis      GYN   (+) Malignant neoplasm of female breast       Lab Results   Component Value Date    HGB 15.2 01/25/2025    HCT 45.4 01/25/2025    WBC 8.1 01/25/2025     01/25/2025     01/25/2025    K 4.3 01/25/2025     01/25/2025    CREATININE 0.78 01/25/2025    BUN 7 01/25/2025       Tobacco Use History[1]    RX Allergies[2]    Current Outpatient Medications   Medication Instructions   • acetaminophen (TYLENOL) 1,000 mg, Every 6 hours PRN   • ALPRAZolam (XANAX) 0.5 mg, oral, Daily PRN   • amitriptyline (ELAVIL) 25 mg, oral, Nightly   • aspirin-acetaminophen-caffeine (Excedrin Migraine) 250-250-65 mg tablet 1 tablet, Every 6 hours PRN   •  baclofen (LIORESAL) 10 mg, Every 8 hours PRN   • busPIRone (Buspar) 5 mg tablet Take 1 tablet (5 mg) by mouth once daily AND 2 tablets (10 mg) once daily at bedtime.   • clobetasoL-calcipotriene 0.05-0.005 % solution APPLY AND GENTLY MASSAGE INTO AFFECTED AREA(S) ONCE DAILY   • desvenlafaxine (PRISTIQ) 50 mg, oral, Daily   • dicyclomine (BENTYL) 10 mg, 4 times daily PRN   • ergocalciferol (VITAMIN D-2) 1,250 mcg, oral, Once Weekly   • estradiol (Vivelle-DOT) 0.075 mg/24 hr patch APPLY 1 PATCH TOPICALLY TO THE SKIN EVERY 3 AND 1/2 DAYS   • gabapentin (NEURONTIN) 1,200 mg, oral, Nightly   • GAS RELIEF, SIMETHICONE, ORAL 1 tablet, oral, Daily PRN   • lamoTRIgine (LAMICTAL) 100 mg, oral, Daily   • lidocaine (Lidoderm) 5 % patch 1 patch, Daily RT   • omeprazole (PRILOSEC) 40 mg, Daily before breakfast   • ondansetron ODT (ZOFRAN-ODT) 8 mg, oral, Every 8 hours PRN   • oxyCODONE (Roxicodone) 5 mg immediate release tablet TAKE 1 TABLET BY MOUTH EVERY 6 HOURS FOR UP TO 3 DAYS AS NEEDED FOR PAIN   • sodium,potassium,mag sulfates (Suprep) 17.5-3.13-1.6 gram solution Take one bottle twice as directed by the prep instructions   • solifenacin (VESICARE) 10 mg, Daily   • traZODone (DESYREL) 150-200 mg, oral, Nightly       Surgical History[3]    Family History[4]    NPO Details:  NPO/Void Status  Date of Last Liquid: 05/08/25  Time of Last Liquid: 0900  Date of Last Solid: 05/05/25  Time of Last Solid: 1730        Physical Exam  Airway  Mallampati: I  TM distance: >3 FB  Neck ROM: full  Mouth opening: >= 4 cm    Cardiovascular - normal exam  Dental - normal exam  Pulmonary - normal exam  Neurological   Abdominal       Anesthesia Plan    History of general anesthesia?: yes  History of complications of general anesthesia?: no    ASA 3     MAC     The patient is not a current smoker.    intravenous induction   Anesthetic plan and risks discussed with patient.    Plan discussed with CRNA.             [1]  Social History  Tobacco Use    Smoking Status Every Day   • Current packs/day: 1.00   • Average packs/day: 1 pack/day for 15.0 years (15.0 ttl pk-yrs)   • Types: Cigarettes   Smokeless Tobacco Never   Tobacco Comments    More than 15 years   [2]  Allergies  Allergen Reactions   • Azithromycin Nausea/vomiting   • Penicillins Hives and Diarrhea   • Trospium Hives   • Adhesive Tape-Silicones Rash   • Aloe Vera Hives and Rash   • Erythromycin Diarrhea, Hives, Other and Rash   • Latex Hives, Rash and Swelling     Per pt latex tape   [3]  Past Surgical History:  Procedure Laterality Date   • CARPAL TUNNEL RELEASE Bilateral    • CHOLECYSTECTOMY  2022   • COLONOSCOPY     • CYSTOSCOPY      x 2   • ENDOMETRIAL ABLATION  2009   • HYSTERECTOMY  1/11/11   • TUBAL LIGATION     • UPPER GASTROINTESTINAL ENDOSCOPY     • VAGINA SURGERY      vaginal lesion ablation   • WISDOM TOOTH EXTRACTION  2000   [4]  Family History  Problem Relation Name Age of Onset   • Alcohol abuse Mother Pretty    • Arthritis Mother Pretty    • Asthma Mother Pretty    • COPD Mother Pretty    • Depression Mother Pretty    • Hypertension Mother Pretty    • Mental illness Mother Pretty    • Miscarriages / Stillbirths Mother Pretty    • Anxiety disorder Mother Pretty    • Atrial fibrillation Mother Pretty    • Alcohol abuse Father Chidi    • COPD Father Chidi    • Mental illness Father Chidi    • Hearing loss Father Chidi    • Celiac disease Father Chidi    • Alcohol abuse Brother Vick    • Depression Brother Vick    • Diabetes Brother Vick    • Drug abuse Brother Vick    • Mental illness Brother Vick    • Bipolar disorder Brother Vick    • Other (cannabis use disorder) Brother Vick    • Anxiety disorder Brother Vick    • Paranoid behavior Brother Vick    • Schizophrenia Brother Vick    • Self-Injury Brother Vick    • Suicide Attempts Brother Vick    • Depression Daughter Kinadetoya    • Genetic Testing Daughter Kathleen    • Mental illness Daughter Kinadee    • Blood Disorder Daughter Kinadee     • Clotting disorder Daughter Kinadee    • Depression Son Noel    • Mental illness Son Noel    • Depression Son Efren    • Anesthesia related problems Son Efren    • Breast cancer Maternal Grandmother Chantale Perez    • Cancer Maternal Grandmother Chantale Perez    • Diabetes Maternal Grandmother Chantale Perez    • Heart disease Maternal Grandmother Chantale Chris    • Breast cancer Other     • Lung cancer Other     •  labor Sister Oksana Blackwood

## 2025-05-08 NOTE — H&P
Procedure H&P    Patient Profile-Procedures  Name Jennifer Blackwood  Date of Birth 1980  MRN [id number]  Address   [address] [place] [id number] [place] [address]    Primary Phone Number [phone]  Secondary Phone Number    Jean Perez    Procedure(s):  Procedures: Colonoscopy  Primary contact name and number   Extended Emergency Contact Information  Primary Emergency Contact: Thonymaria elenaNorberto  Address: [address] [place] [address] [facility]  Home Phone: [phone]  Relation: Spouse  Secondary Emergency Contact: Oksana Blackwood  Address: [address] [place] [address] [facility]  Home Phone: [phone]  Relation: Sister    General Health  Weight   Vitals:    05/08/25 1244   Weight: 74.2 kg (163 lb 9.3 oz)     BMI Body mass index is 27.22 kg/m².    Allergies  RX Allergies[1]    Past Medical History   Medical History[2]    Provider assessment  Diagnosis: diarrhea   Medication Reviewed - yes  Prior to Admission medications    Medication Sig Start Date End Date Taking? Authorizing Provider   acetaminophen (Tylenol) 500 mg tablet Take 2 tablets (1,000 mg) by mouth every 6 hours if needed for mild pain (1 - 3).   Yes Historical Provider, MD   ALPRAZolam (Xanax) 0.5 mg tablet Take 1 tablet (0.5 mg) by mouth once daily as needed for anxiety. 5/5/25  Yes Allyn Acosta, APRN-CNP, APRN-CNS   amitriptyline (Elavil) 25 mg tablet Take 1 tablet (25 mg) by mouth once daily at bedtime. 2/17/25  Yes Jean Roman,    aspirin-acetaminophen-caffeine (Excedrin Migraine) 250-250-65 mg tablet Take 1 tablet by mouth every 6 hours if needed for headaches.   Yes Historical Provider, MD   baclofen (Lioresal) 10 mg tablet 1 tablet (10 mg) every 8 hours if needed. 5/17/24  Yes Historical Provider, MD   busPIRone (Buspar) 5 mg tablet Take 1 tablet (5 mg) by mouth once daily AND 2 tablets (10 mg) once daily at bedtime. 3/24/25 6/22/25 Yes Allyn AYERS  Acosta, APRN-CNP, PABLO-CNS   desvenlafaxine 50 mg 24 hr tablet Take 1 tablet (50 mg) by mouth once daily. 3/24/25  Yes CHRISTINA Joseph, PABLO-CNS   dicyclomine (Bentyl) 10 mg capsule Take 1 capsule (10 mg) by mouth 4 times a day as needed (abdominal cramping).   Yes Historical Provider, MD   ergocalciferol (Vitamin D-2) 1.25 MG (81715 UT) capsule Take 1 capsule (1,250 mcg) by mouth 1 (one) time per week. 2/10/25  Yes Jean Roman DO   estradiol (Vivelle-DOT) 0.075 mg/24 hr patch APPLY 1 PATCH TOPICALLY TO THE SKIN EVERY 3 AND 1/2 DAYS 8/22/24  Yes Historical Provider, MD   gabapentin (Neurontin) 400 mg capsule Take 3 capsules (1,200 mg) by mouth once daily at bedtime. 1/24/25  Yes Jean Roman DO   lamoTRIgine (LaMICtal) 100 mg tablet Take 1 tablet (100 mg) by mouth once daily. 3/24/25 6/22/25 Yes CHRISTINA Joseph APRN-CNS   lidocaine (Lidoderm) 5 % patch Place 1 patch on the skin once daily. 6/13/24  Yes Historical Provider, MD   omeprazole (PriLOSEC) 40 mg DR capsule Take 1 capsule (40 mg) by mouth once daily in the morning. Take before meals. 1/2 hr prior to breakfast 8/10/23  Yes Historical Provider, MD   ondansetron ODT (Zofran-ODT) 8 mg disintegrating tablet Dissolve 1 tablet (8 mg) in the mouth every 8 hours if needed for nausea or vomiting. 4/8/25  Yes Jean Roman DO   sodium,potassium,mag sulfates (Suprep) 17.5-3.13-1.6 gram solution Take one bottle twice as directed by the prep instructions 4/8/25  Yes Nicole Hill MD   traZODone (Desyrel) 100 mg tablet Take 1.5-2 tablets (150-200 mg) by mouth once daily at bedtime. 3/24/25 6/22/25 Yes Allyn M Acosta, APRN-CNP, APRN-CNS   clobetasoL-calcipotriene 0.05-0.005 % solution APPLY AND GENTLY MASSAGE INTO AFFECTED AREA(S) ONCE DAILY    Historical Provider, MD   GAS RELIEF, SIMETHICONE, ORAL Take 1 tablet by mouth once daily as needed (gas).    Historical Provider, MD   oxyCODONE (Roxicodone) 5 mg immediate release tablet TAKE 1 TABLET BY MOUTH  "EVERY 6 HOURS FOR UP TO 3 DAYS AS NEEDED FOR PAIN  Patient not taking: Reported on 5/8/2025 7/5/24   Historical Provider, MD   solifenacin (VESIcare) 10 mg tablet Take 1 tablet (10 mg) by mouth once daily.    Historical Provider, MD   ALPRAZolam (Xanax) 0.5 mg tablet Take 1 tablet (0.5 mg) by mouth once daily as needed for anxiety. 2/24/25 5/5/25  Allyn Acosta, APRN-CNP, APRN-CNS       Physical Exam  Vitals:    05/08/25 1244   BP: 122/85   Pulse: 82   Resp: 16   Temp: 36.1 °C (97 °F)   SpO2: 99%        General: A&Ox3, NAD.  HEENT: AT/NC.   CV: RRR. No murmur.  Resp: CTA bilaterally. No wheezing, rhonchi or rales.   GI: Soft, NT/ND. BSx4.  Extrem: No edema. Pulses intact.  Neuro: No focal deficits.   Psych: Normal mood and affect.      Procedure Plan - pre-procedural (re)assesment completed by physician:  discharge/transfer patient when discharge criteria met    Nicole Hill MD  5/8/2025 12:56 PM           [1]   Allergies  Allergen Reactions    Azithromycin Nausea/vomiting    Penicillins Hives and Diarrhea    Trospium Hives    Adhesive Tape-Silicones Rash    Aloe Vera Hives and Rash    Erythromycin Diarrhea, Hives, Other and Rash    Latex Hives, Rash and Swelling     Per pt latex tape   [2]   Past Medical History:  Diagnosis Date    Adjustment disorder 2000    Adverse effect of anesthesia     \"I just wake up very emotional\"    Allergic 1995    Anxiety 1997    Chronic pain disorder 1/11/11    chronic pelvic pain    Depression 1997    Diverticulosis     Dysfunctional uterine bleeding     Eczema 1991    GERD (gastroesophageal reflux disease) 1997    Headache     migraines    Hiatal hernia     History of blood transfusion     Hypertension     Irritable bowel syndrome     Liver disease 2022    fatty liver    Other abnormal auditory perceptions, unspecified ear 01/19/2018    Abnormal auditory perception    Other bursitis of knee, unspecified knee     Pes anserine bursitis    Panic attack     Panic disorder 1997    " PTSD (post-traumatic stress disorder) 1/11/11    Sleep difficulties     Ulcerative colitis     Urinary tract infection 1997    Varicella 1986    Wears dentures     upper and lower

## 2025-05-08 NOTE — Clinical Note
Huddle and Timeout completed together with team. Patient wristband and SAADIA information verified.  Anesthesia safety check completed. Patient was Anesthesia safety check completed

## 2025-05-08 NOTE — DISCHARGE INSTRUCTIONS
Patient Instructions after an endoscopy or colonoscopy    Physician Phone List Provided      The anesthetics, sedatives or narcotics which were given to you today will be acting in your body for the next 24 hours, so you might feel a little sleepy or groggy.  This feeling should slowly wear off. Carefully read and follow the instructions.     You received sedation today:  - Do not drive or operate any machinery or power tools of any kind.   - No alcoholic beverages today, not even beer or wine.  - No over the counter medications that contain alcohol or that may cause drowsiness.  - Do not make any important decisions or sign any legal documents.    While it is common to experience mild to moderate abdominal distention, gas, or belching after your procedure, if any of these symptoms occur following discharge from the GI Lab or within one week of having your procedure, call the [facility] to be advised whether a visit to your nearest Urgent Care or Emergency Department is indicated.  Take this paper with you if you go.     - If you develop an allergic reaction to the medications that were given during your procedure such as difficulty breathing, rash, hives, severe nausea, vomiting or lightheadedness.- If you experience chest pain, shortness of breath, severe abdominal pain, fevers and chills.    -If you develop signs and symptoms of bleeding such as blood in your spit, if your stools turn black, tarry, or bloody        High Fiber Diet    About this topic  Dietary fiber helps many illnesses. It can help you if you cannot have a bowel movement or if you have loose stools. Fiber can also lower your risk of diabetes and heart disease. Fiber can help with weight loss by helping you feel mcelroy after meals.  You can find fiber in fruits, vegetables, nuts and seeds, whole grains, and legumes. The fiber is the part of the plant food that your body cannot break down and absorb. It passes through your stomach,  small bowel, colon, and out your body.  There are two kinds of fiber: Insoluble and soluble fiber. Insoluble fiber helps you pass foods through your digestive system. Insoluble fiber can help you with hard stools. Soluble fiber draws water in and turns it into a gel-like form making digestion slow down. Both are important.    What will the results be?  A high fiber diet can help you with bowel problems like stools that are too hard or too loose. It can also help prevent hemorrhoids and other colon problems. A high fiber diet can also help control your weight and lower blood sugar and cholesterol levels.  What changes to diet are needed?  The amount of fiber you need is based on your age, gender, and health.  Try to get 20 to 35 grams of fiber in your diet each day. Most people in the [place] only eat 15 grams of fiber daily.  Drink at least 8 cups (1920 mL) of fluid each day.  When is this diet used?  Your doctor may talk with you about this diet if you have belly problems.  Who should use this diet?  Older children, young people, and adults can have this diet.  Who should not use this diet?  Some people should not use this diet. Check with your doctor if you have:  Diverticulitis  Active Crohn's disease  Ulcerative colitis  Bowel inflammation  Certain types of GI surgery  Talk to your child's doctor before starting your child on a high fiber diet.  What foods are good to eat?  To get the most from fiber in your diet, eat a wide variety of high fiber foods. Some examples are:  Vegetables like:  Spinach  Peas  Artichoke  Sweet potatoes with skin  Broccoli  Fruits like:  Raspberries  Blueberries  Blackberries  Apples with skin  Dried fruits  Grains like:  Oat bran  Barley  Whole wheat products  Wheat bran  Dried beans and nuts like:  Sunflower seeds  Almonds  Black beans  Chickpeas  What problems could happen?  Sudden increase of fiber intake can lead to gas, pain, fullness in your belly, and loose stools. Increase fiber  gradually while drinking plenty of fluids.  Do not eat too much fiber. Your body will not take in vitamins and minerals as well if you eat too much fiber.  When do I need to call the doctor?  Health problem is not better or you are feeling worse.  Helpful tips  Start slow as you add more fiber to your diet. This may help prevent gas or cramps.  Try to eat the same amount of fiber each day. Aim to get your fiber from nutritious foods. Supplements do not offer the same benefits as food.  Read food labels with care to learn how much fiber is in the food you are eating.  If possible, do not peel fruits or vegetables before you eat them. Eating the peel gives you more fiber.  Where can I learn more?  Eat Right  https://www.eatright.org/food/vitamins-and-supplements/types-of-vitamins-and-nutrients/easy-ways-to-boost-fiber-in-your-daily-diet  Last Reviewed Date  2021-09-23

## 2025-05-08 NOTE — Clinical Note
Patient tolerated procedure well. Appears comfortable with no complaints of pain. VS stable. Arousable prior to transport. Patient transported to [facility] via cart.  Report called              . Handoff completed

## 2025-05-14 ENCOUNTER — APPOINTMENT (OUTPATIENT)
Dept: PRIMARY CARE | Facility: CLINIC | Age: 45
End: 2025-05-14
Payer: COMMERCIAL

## 2025-05-14 VITALS
OXYGEN SATURATION: 97 % | HEART RATE: 78 BPM | SYSTOLIC BLOOD PRESSURE: 125 MMHG | DIASTOLIC BLOOD PRESSURE: 75 MMHG | WEIGHT: 176 LBS | BODY MASS INDEX: 29.29 KG/M2

## 2025-05-14 DIAGNOSIS — L30.9 DERMATITIS: ICD-10-CM

## 2025-05-14 DIAGNOSIS — Z12.31 BREAST CANCER SCREENING BY MAMMOGRAM: ICD-10-CM

## 2025-05-14 DIAGNOSIS — Z13.21 ENCOUNTER FOR VITAMIN DEFICIENCY SCREENING: ICD-10-CM

## 2025-05-14 DIAGNOSIS — Z00.00 ENCOUNTER FOR WELL ADULT EXAM WITHOUT ABNORMAL FINDINGS: Primary | ICD-10-CM

## 2025-05-14 PROCEDURE — 99396 PREV VISIT EST AGE 40-64: CPT | Performed by: FAMILY MEDICINE

## 2025-05-14 RX ORDER — TRIAMCINOLONE ACETONIDE 1 MG/G
1 CREAM TOPICAL 2 TIMES DAILY PRN
Qty: 30 G | Refills: 1 | Status: SHIPPED | OUTPATIENT
Start: 2025-05-14 | End: 2025-05-28

## 2025-05-14 ASSESSMENT — PATIENT HEALTH QUESTIONNAIRE - PHQ9
SUM OF ALL RESPONSES TO PHQ9 QUESTIONS 1 & 2: 3
2. FEELING DOWN, DEPRESSED OR HOPELESS: NEARLY EVERY DAY
9. THOUGHTS THAT YOU WOULD BE BETTER OFF DEAD, OR OF HURTING YOURSELF: NOT AT ALL
1. LITTLE INTEREST OR PLEASURE IN DOING THINGS: NOT AT ALL
5. POOR APPETITE OR OVEREATING: NEARLY EVERY DAY
4. FEELING TIRED OR HAVING LITTLE ENERGY: NEARLY EVERY DAY
6. FEELING BAD ABOUT YOURSELF - OR THAT YOU ARE A FAILURE OR HAVE LET YOURSELF OR YOUR FAMILY DOWN: NEARLY EVERY DAY
7. TROUBLE CONCENTRATING ON THINGS, SUCH AS READING THE NEWSPAPER OR WATCHING TELEVISION: NEARLY EVERY DAY
8. MOVING OR SPEAKING SO SLOWLY THAT OTHER PEOPLE COULD HAVE NOTICED. OR THE OPPOSITE, BEING SO FIGETY OR RESTLESS THAT YOU HAVE BEEN MOVING AROUND A LOT MORE THAN USUAL: NEARLY EVERY DAY
10. IF YOU CHECKED OFF ANY PROBLEMS, HOW DIFFICULT HAVE THESE PROBLEMS MADE IT FOR YOU TO DO YOUR WORK, TAKE CARE OF THINGS AT HOME, OR GET ALONG WITH OTHER PEOPLE: VERY DIFFICULT

## 2025-05-14 ASSESSMENT — PROMIS GLOBAL HEALTH SCALE
RATE_SOCIAL_SATISFACTION: POOR
RATE_AVERAGE_FATIGUE: SEVERE
RATE_AVERAGE_PAIN: 7
RATE_GENERAL_HEALTH: FAIR
RATE_MENTAL_HEALTH: POOR
RATE_QUALITY_OF_LIFE: FAIR
EMOTIONAL_PROBLEMS: ALWAYS
CARRYOUT_PHYSICAL_ACTIVITIES: MODERATELY
CARRYOUT_SOCIAL_ACTIVITIES: POOR
RATE_PHYSICAL_HEALTH: FAIR

## 2025-05-14 ASSESSMENT — ENCOUNTER SYMPTOMS
FEVER: 0
NUMBNESS: 1
COUGH: 0
WHEEZING: 0
DIARRHEA: 1

## 2025-05-14 NOTE — PROGRESS NOTES
"Subjective   Patient ID: Jennifer Blackwood \"Martina\" is a 44 y.o. female who presents for Annual Exam and Other (Has some concerns about bump all over body. Itches and pt scratches until it creates a sore. ).    HPI     She is here today for annual physical and follow-up  She mentions that she has had pain involving her right collarbone near her SC joint over the past few weeks.  This is 6 out of 10 to 8 out of 10 intensity.  She states that it typically is most noticeable when she wakes up after sleeping.  Pain is present most of the time.  No cough, wheezing or chest pain  She did recently buy a new bed which is adjustable, 2 months ago  She also mentions that over the past 2 to 3 years she will get intermittent small red bumps on her arms.  They will be itchy and she will scratch at them, and they will eventually resolve.  They can occur anywhere from 2 times in a week to once per month.  She tried an over-the-counter steroid cream without improvement  She has a history of vitamin D deficiency currently taking weekly vitamin D2 50,000 IU, and also 1000 vitamin D3 over-the-counter daily.  Her last vitamin D level checked in January of this year was low at 8.  At that time we started her on daily vitamin D 1000 IU  She is following with gastroenterology for IBS-D.  She recently had a colonoscopy done  Last mammogram was 5/2024.  This was category 1  Vaccinations: Received Tdap vaccine in 2017  She is a current smoker.  Smokes 1 pack/day.  She has been smoking for greater than 20 years  She is currently taking gabapentin 1200 mg at bedtime for chronic bilateral lower extremity neuropathy.  Her neuropathy symptoms typically get worse later on in the day at approximately 6 PM.  She feels that taking this dose in the evening tends to work best for her.  She has paresthesias which involve both lower extremities up to just above the level of the knees.  Previous EMG of the lower extremities done in 2018 showed mild " bilateral L5 radiculopathy  She is taking amitriptyline for chronic headaches which occur a few times per week    Review of Systems   Constitutional:  Negative for fever.   Respiratory:  Negative for cough and wheezing.    Cardiovascular:  Negative for chest pain.   Gastrointestinal:  Positive for diarrhea.   Skin:  Positive for rash.   Neurological:  Positive for numbness.       Objective   /75   Pulse 78   Wt 79.8 kg (176 lb)   SpO2 97%   BMI 29.29 kg/m²     Physical Exam  Vitals reviewed.   Constitutional:       General: She is not in acute distress.     Appearance: Normal appearance. She is well-developed.   HENT:      Head: Normocephalic.      Right Ear: Tympanic membrane, ear canal and external ear normal.      Left Ear: Tympanic membrane, ear canal and external ear normal.      Nose: Nose normal.      Mouth/Throat:      Mouth: Mucous membranes are moist.   Eyes:      Conjunctiva/sclera: Conjunctivae normal.   Neck:      Thyroid: No thyromegaly.      Vascular: No JVD.   Cardiovascular:      Rate and Rhythm: Normal rate and regular rhythm.      Heart sounds: Normal heart sounds.   Pulmonary:      Effort: Pulmonary effort is normal.      Breath sounds: Normal breath sounds.   Abdominal:      Palpations: Abdomen is soft.      Tenderness: There is no abdominal tenderness.   Musculoskeletal:         General: Tenderness present. Normal range of motion.      Comments: There is reproducible tenderness directly over the right SC joint.  No swelling, warmth or erythema.  No tenderness anywhere else including along the clavicle or along the right upper ribs.  There is no tenderness or swelling or masses in the neck/cervical area   Lymphadenopathy:      Cervical: No cervical adenopathy.   Skin:     Findings: Rash present.      Comments: There are a total of 2, very small dull erythematous papules located on left distal forearm near wrist   Neurological:      Mental Status: She is alert and oriented to person,  place, and time.   Psychiatric:         Mood and Affect: Mood normal.         Behavior: Behavior normal.         Assessment/Plan   Assessment & Plan  Encounter for well adult exam without abnormal findings         Dermatitis    Orders:    triamcinolone (Kenalog) 0.1 % cream; Apply 1 Application topically 2 times a day as needed for rash for up to 14 days.    Encounter for vitamin deficiency screening    Orders:    Vitamin D 25-Hydroxy,Total (for eval of Vitamin D levels); Future    Breast cancer screening by mammogram    Orders:    BI mammo bilateral screening tomosynthesis; Future    Healthy diet and regular exercises recommended  She is due for a screening mammogram.  This was ordered today  Up-to-date on tetanus vaccine  Continue to follow with gastroenterology for IBS-D  We will recheck her vitamin D level due to history of vitamin D deficiency  I sent in a prescription for topical triamcinolone cream for her to apply as needed for rash.  If this is not effective, recommended that she contact me and we will consider referral to dermatology  We also discussed her clavicle pain.  She has reproducible tenderness directly over the SC joint.  She recently got a new bed a few months ago, and feels that this is most noticeable when she wakes up after sleeping, so I suspect that this is most likely due to positioning during sleep.  We discussed trying to change her position when sleeping, and using ice and heat, and over-the-counter analgesics as needed.  If this does not resolve in the next few weeks, I recommended that she contact me and we will plan on ordering an x-ray  Continue gabapentin for chronic lower extremity neuropathy.  This has been stable and she does not need a refill today  Follow-up in 6 months for recheck

## 2025-05-15 ENCOUNTER — TELEPHONE (OUTPATIENT)
Dept: PRIMARY CARE | Facility: CLINIC | Age: 45
End: 2025-05-15
Payer: COMMERCIAL

## 2025-05-15 DIAGNOSIS — Z13.21 ENCOUNTER FOR VITAMIN DEFICIENCY SCREENING: Primary | ICD-10-CM

## 2025-05-15 LAB — 25(OH)D3+25(OH)D2 SERPL-MCNC: 15 NG/ML (ref 30–100)

## 2025-05-15 NOTE — TELEPHONE ENCOUNTER
Vitamin D level is decreased at 15  She has a history of vitamin D deficiency currently taking vitamin D 50,000 IU weekly and also over-the-counter vitamin D3 1000 IU daily    I spoke with her over the phone.  We will continue vitamin D 50,000 IU weekly and increase her daily dose of vitamin D3 from 1000 to 2000 IU daily.  Recheck vitamin D level in 6 to 8 weeks

## 2025-05-16 LAB
LABORATORY COMMENT REPORT: NORMAL
PATH REPORT.FINAL DX SPEC: NORMAL
PATH REPORT.GROSS SPEC: NORMAL
PATH REPORT.RELEVANT HX SPEC: NORMAL
PATH REPORT.TOTAL CANCER: NORMAL

## 2025-05-27 ENCOUNTER — APPOINTMENT (OUTPATIENT)
Dept: RADIOLOGY | Facility: HOSPITAL | Age: 45
End: 2025-05-27
Payer: COMMERCIAL

## 2025-05-27 VITALS — WEIGHT: 176 LBS | BODY MASS INDEX: 29.32 KG/M2 | HEIGHT: 65 IN

## 2025-05-27 DIAGNOSIS — Z12.31 BREAST CANCER SCREENING BY MAMMOGRAM: ICD-10-CM

## 2025-05-27 PROCEDURE — 77067 SCR MAMMO BI INCL CAD: CPT | Performed by: RADIOLOGY

## 2025-05-27 PROCEDURE — 77063 BREAST TOMOSYNTHESIS BI: CPT | Performed by: RADIOLOGY

## 2025-05-27 PROCEDURE — 77067 SCR MAMMO BI INCL CAD: CPT

## 2025-05-28 ENCOUNTER — APPOINTMENT (OUTPATIENT)
Dept: BEHAVIORAL HEALTH | Facility: CLINIC | Age: 45
End: 2025-05-28
Payer: COMMERCIAL

## 2025-05-28 DIAGNOSIS — G47.09 OTHER INSOMNIA: ICD-10-CM

## 2025-05-28 DIAGNOSIS — F33.1 MODERATE EPISODE OF RECURRENT MAJOR DEPRESSIVE DISORDER: ICD-10-CM

## 2025-05-28 DIAGNOSIS — F41.1 GENERALIZED ANXIETY DISORDER: ICD-10-CM

## 2025-05-28 DIAGNOSIS — F43.10 PTSD (POST-TRAUMATIC STRESS DISORDER): ICD-10-CM

## 2025-05-29 ENCOUNTER — APPOINTMENT (OUTPATIENT)
Dept: ORTHOPEDIC SURGERY | Facility: CLINIC | Age: 45
End: 2025-05-29
Payer: COMMERCIAL

## 2025-05-29 ENCOUNTER — HOSPITAL ENCOUNTER (OUTPATIENT)
Dept: RADIOLOGY | Facility: HOSPITAL | Age: 45
Discharge: HOME | End: 2025-05-29
Payer: COMMERCIAL

## 2025-05-29 DIAGNOSIS — M18.11 ARTHRITIS OF CARPOMETACARPAL (CMC) JOINT OF RIGHT THUMB: ICD-10-CM

## 2025-05-29 DIAGNOSIS — M18.11 ARTHRITIS OF CARPOMETACARPAL (CMC) JOINT OF RIGHT THUMB: Primary | ICD-10-CM

## 2025-05-29 PROCEDURE — 99214 OFFICE O/P EST MOD 30 MIN: CPT | Performed by: STUDENT IN AN ORGANIZED HEALTH CARE EDUCATION/TRAINING PROGRAM

## 2025-05-29 PROCEDURE — 20600 DRAIN/INJ JOINT/BURSA W/O US: CPT | Performed by: STUDENT IN AN ORGANIZED HEALTH CARE EDUCATION/TRAINING PROGRAM

## 2025-05-29 PROCEDURE — 73130 X-RAY EXAM OF HAND: CPT | Mod: RT

## 2025-05-29 PROCEDURE — 73130 X-RAY EXAM OF HAND: CPT | Mod: RIGHT SIDE

## 2025-05-29 RX ORDER — LIDOCAINE HYDROCHLORIDE 10 MG/ML
0.5 INJECTION, SOLUTION INFILTRATION; PERINEURAL
Status: COMPLETED | OUTPATIENT
Start: 2025-05-29 | End: 2025-05-29

## 2025-05-29 RX ADMIN — LIDOCAINE HYDROCHLORIDE 0.5 ML: 10 INJECTION, SOLUTION INFILTRATION; PERINEURAL at 11:09

## 2025-05-29 NOTE — PROGRESS NOTES
History of Present Illness  Patient returns today for evaluation of right CMC arthritis.  Underwent cortisone injection 2/27/2025.  Has been wearing brace.  States she had about 2 months of symptom relief, pain has returned over the last 2 weeks..       Physical Examination:  General: Alert and oriented to person, place, and time.  No acute distress and breathing comfortably: Pleasant and cooperative with examination.  HEENT: Head is normocephalic and atraumatic.  Neck: Supple, no visible swelling.  Cardiovascular: No palpable tachycardia  Lungs: No audible wheezing or labored breathing  Abdomen: Nondistended.  On musculoskeletal examination, there is tenderness to palpation about the right thumb CMC joint.  Positive provocative testing with grind and crepitus.  Negative Finkelstein.  Negative Tinel, Phalen, Durkan at carpal tunnel.  Wrist range of motion is full in flexion and extension.  Patient is able to make a full composite fist.  Patient is able to oppose and retropulse the thumb.    Sensation and motor function are intact in the radial, and median nerve distribution.  The hand itself is warm and well perfused. The skin is intact throughout. The contralateral hand/wrist are normal to inspection, range of motion, stability, and strength.    S Inj/Asp: R thumb CMC on 5/29/2025 11:09 AM  Indications: pain  Details: 24 G needle, plantar approach  Medications: 5 mg triamcinolone acetonide 10 mg/mL; 0.5 mL lidocaine 10 mg/mL (1 %)  Outcome: tolerated well, no immediate complications  Procedure, treatment alternatives, risks and benefits explained, specific risks discussed. Consent was given by the patient. Immediately prior to procedure a time out was called to verify the correct patient, procedure, equipment, support staff and site/side marked as required. Patient was prepped and draped in the usual sterile fashion.             Assessment:  Patient with right thumb CMC arthritis.  Discussed at length with  patient today, she would like to proceed with repeat cortisone injection.    Plan:   Right thumb CMC injection. I had a long discussion with the patient regarding the diagnosis of thumb CMC arthritis and the risks/benefits/expected outcomes of various treatment options. At this point, the patient elected non-operative treatment consisting of activity modification, intermittant NSAIDs (if not medically contraindicated), and/or thumb spica splinting. I also discussed the option of a corticosteroid injection. Specifically, I reviewed the risks of injection which include, but are not limited to, infection, bleeding, pain, steroid flare, glycemic alteration, subcutaneous fat atrophy, skin hypopigmentation, soft tissue damage, and incomplete symptom relief. The patient consented to the injection, and then using sterile technique, I injected a 1mL of Kenalog 40 into the thumb CMC joint. The injection site was dressed, and the patient tolerated the injection well. Finally, I have emphasized patience, as any benefit may take some time to manifest. Depending on the success of this non-operative course, I will see them back on an as needed basis.      Flori Torres MD

## 2025-06-16 NOTE — PROGRESS NOTES
"  Jennifer Blackwood is a 44 y.o. female with past medical history of HTN, adjustment disorder, PTSD, chronic pain disorder, hx cholecystectomy, and GERD who presents today for follow up of IBS-D. Chronic diarrhea with abdominal pain since childhood. When she was 15 she was told she had ulcerative colitis. Then later told she did not have UC. Was on high dose NSAIDS at that time.     10/2021 EGD with 2 cm hiatal hernia. No celiac disease. Colonoscopy with small tubular adenomas, sigmoid diverticulosis, external hemorrhoids. Path consistent with lymphocytic colitis. Treated with budesonide. Required multiple rounds. Improved some but continued to have diarrhea episodes (6 daily), nausea. Given xifaxin which helped.     3/2025 Diarrhea flare since November after increased stress with 12-20 loose stools daily, nocturnal awakening, nausea. No NSAIDS. Using Imodium prn but this worsens cramping.    5/2025 Colonoscopy normal. Normal colon biopsies. Hyperplastic polyp.    Presents today for follow up. She completed another course of xifaxin with some improvement. Diarrhea resolved. Having BM every other day. Taking amitriptyline 25 mg nightly and Bentyl as needed. Still with chronic nausea despite PPI. Has tried ginger, needing Zofran daily. Has attempted gastric emptying study but this was not tolerated.     Family history:  Brother with Crohn's disease. Father with celiac disease. Mother, sister with IBS. No family history of colon cancer.     Current Medications[1]      Review of Systems  Review of Systems negative except as noted in HPI.    Objective     /71   Pulse (!) 114   Temp 37.1 °C (98.7 °F)   Ht 1.66 m (5' 5.35\")   Wt 83.5 kg (184 lb)   BMI 30.29 kg/m²      Physical Exam  Constitutional:  No acute distress. Normal appearance. Not ill-appearing.  HENT:  Head normocephalic and atraumatic. Conjunctivae normal.  Cardiovascular:  Normal rate. Regular rhythm.  Pulmonary:  Pulmonary effort normal. No " respiratory distress.   Abdominal:  Abdomen is soft. There is no distension. Lower abdominal tenderness with palpation. No rebound or guarding.  Skin: Dry.  Neurological:  Alert and oriented.  Psychiatric:  Mood and affect normal.    Assessment/Plan     44 y.o. female with history of HTN, adjustment disorder, PTSD, chronic pain disorder, cholecystectomy, GERD, IBS-D, and hx of lymphocytic colitis who presents today for clinic follow up of diarrhea. Recent colonoscopy without lymphocytic colitis recurrence. She completed a course of xifaxin with improvement.    Her main issue today is her chronic nausea. Prior barium swallow showed dysmotility, reflux, hiatal hernia. She does well on omeprazole overall but has been unable to come off this. Her nausea persists and she is requiring Zofran daily. Was unable to tolerate gastric emptying study. We will trial Reglan for possible gastroparesis.     Recommendations  Stop Zofran for now. Trial Reglan for possible gastroparesis (delayed gastric emptying). If no improvement in a week or 2 let me know and we can increase dose.  Continue omeprazole 40 mg daily for reflux.  Continue amitriptyline 25 mg nightly with Bentyl (dicyclomine) as needed.  Follow up 3 months.    Electronically signed by: Estee Flores CNP on 6/27/2025 at 11:03 AM                   [1]   Current Outpatient Medications   Medication Sig Dispense Refill    acetaminophen (Tylenol) 500 mg tablet Take 2 tablets (1,000 mg) by mouth every 6 hours if needed for mild pain (1 - 3).      ALPRAZolam (Xanax) 0.5 mg tablet Take 1 tablet (0.5 mg) by mouth once daily as needed for anxiety. 10 tablet 1    amitriptyline (Elavil) 25 mg tablet Take 1 tablet (25 mg) by mouth once daily at bedtime. 90 tablet 2    aspirin-acetaminophen-caffeine (Excedrin Migraine) 250-250-65 mg tablet Take 1 tablet by mouth every 6 hours if needed for headaches.      baclofen (Lioresal) 10 mg tablet 1 tablet (10 mg) every 8 hours if needed.       busPIRone (Buspar) 10 mg tablet Take 1 tablet (10 mg) by mouth once daily at bedtime. Titrating total daily dose from 15--> 20 mg 30 tablet 2    busPIRone (Buspar) 5 mg tablet Take 1 tab PO daily in the morning, and 1 tab PO at bedtime (with 10 mg tab for a total of 15 mg at bedtime). 60 tablet 2    calcium carbonate 500 mg calcium (1,250 mg) chewable tablet Chew and swallow 1 tablet (500 mg of elemental calcium) once daily.      desvenlafaxine 50 mg 24 hr tablet Take 1 tablet (50 mg) by mouth once daily. 90 tablet 1    dicyclomine (Bentyl) 10 mg capsule Take 1 capsule (10 mg) by mouth 4 times a day as needed (abdominal cramping).      ergocalciferol (Vitamin D-2) 1.25 MG (82599 UT) capsule Take 1 capsule (1,250 mcg) by mouth 1 (one) time per week. 8 capsule 3    ergocalciferol, vitamin D2, (VITAMIN D2 ORAL)       estradiol (Vivelle-DOT) 0.075 mg/24 hr patch APPLY 1 PATCH TOPICALLY TO THE SKIN EVERY 3 AND 1/2 DAYS      gabapentin (Neurontin) 400 mg capsule Take 3 capsules (1,200 mg) by mouth once daily at bedtime. 90 capsule 5    GAS RELIEF, SIMETHICONE, ORAL Take 1 tablet by mouth once daily as needed (gas).      lamoTRIgine (LaMICtal) 100 mg tablet Take 1 tablet (100 mg) by mouth once daily. 90 tablet 1    lidocaine (Lidoderm) 5 % patch Place 1 patch on the skin once daily.      omeprazole (PriLOSEC) 40 mg DR capsule Take 1 capsule (40 mg) by mouth once daily in the morning. Take before meals. 1/2 hr prior to breakfast      ondansetron ODT (Zofran-ODT) 8 mg disintegrating tablet Dissolve 1 tablet (8 mg) in the mouth every 8 hours if needed for nausea or vomiting. 30 tablet 1    solifenacin (VESIcare) 10 mg tablet Take 1 tablet (10 mg) by mouth once daily.      traZODone (Desyrel) 100 mg tablet Take 1.5-2 tablets (150-200 mg) by mouth once daily at bedtime. 60 tablet 2    clobetasoL-calcipotriene 0.05-0.005 % solution APPLY AND GENTLY MASSAGE INTO AFFECTED AREA(S) ONCE DAILY (Patient not taking: Reported on  6/27/2025)      metoclopramide (Reglan) 5 mg tablet Take 1 tablet (5 mg) by mouth 4 times a day. 360 tablet 0     No current facility-administered medications for this visit.

## 2025-06-20 ENCOUNTER — OFFICE VISIT (OUTPATIENT)
Dept: PRIMARY CARE | Facility: CLINIC | Age: 45
End: 2025-06-20
Payer: COMMERCIAL

## 2025-06-20 ENCOUNTER — HOSPITAL ENCOUNTER (OUTPATIENT)
Dept: RADIOLOGY | Facility: CLINIC | Age: 45
Discharge: HOME | End: 2025-06-20
Payer: COMMERCIAL

## 2025-06-20 VITALS — OXYGEN SATURATION: 96 % | HEART RATE: 106 BPM | SYSTOLIC BLOOD PRESSURE: 128 MMHG | DIASTOLIC BLOOD PRESSURE: 76 MMHG

## 2025-06-20 DIAGNOSIS — Z13.820 OSTEOPOROSIS SCREENING: Primary | ICD-10-CM

## 2025-06-20 DIAGNOSIS — E55.9 VITAMIN D DEFICIENCY: ICD-10-CM

## 2025-06-20 DIAGNOSIS — Z13.820 OSTEOPOROSIS SCREENING: ICD-10-CM

## 2025-06-20 PROCEDURE — 77080 DXA BONE DENSITY AXIAL: CPT

## 2025-06-20 PROCEDURE — 99213 OFFICE O/P EST LOW 20 MIN: CPT | Performed by: FAMILY MEDICINE

## 2025-06-20 NOTE — PROGRESS NOTES
"Subjective   Patient ID: Jennifer Blackwood \"Martina\" is a 44 y.o. female who presents for Other (Bone density sensitivity . ).    HPI     She is here today to discuss getting a bone density scan  She saw her dentist 3 weeks ago and had an x-ray and was told that there were findings which could indicate low bone density  No history of hip or back fracture in the past, but she has fractured her foot several times in also has fractured her right hand in the past  She has a history of vitamin D deficiency currently taking vitamin D 50,000 IU weekly and also daily vitamin D 2000 IU.  Her last vitamin D level checked 1 month ago was low at 15.  We increased her daily dose from 1000 to 2000 IU daily at that time  She was previously on p.o. budesonide for approximately 2 years        Review of Systems    Objective   /76   Pulse 106   SpO2 96%     Physical Exam  Constitutional:       General: She is not in acute distress.     Appearance: Normal appearance. She is well-developed.   Pulmonary:      Effort: Pulmonary effort is normal.   Skin:     Findings: No rash.   Neurological:      Mental Status: She is alert.   Psychiatric:         Mood and Affect: Mood normal.         Behavior: Behavior normal.         Assessment/Plan   Assessment & Plan  Osteoporosis screening    Orders:    XR DEXA bone density; Future    Vitamin D deficiency         She has several osteoporosis risk factors including vitamin D deficiency and previous long-term corticosteroid use, as well as findings on recent dental x-ray concerning for possible decreased bone density, so we will order a DEXA scan to further evaluate.  Plan on follow-up by phone once I have her results  Continue to take vitamin D for vitamin D deficiency and she will be having a vitamin D level rechecked next month  "

## 2025-06-24 ENCOUNTER — TELEPHONE (OUTPATIENT)
Dept: PRIMARY CARE | Facility: CLINIC | Age: 45
End: 2025-06-24
Payer: COMMERCIAL

## 2025-06-24 DIAGNOSIS — M85.80 OSTEOPENIA, UNSPECIFIED LOCATION: Primary | ICD-10-CM

## 2025-06-24 NOTE — TELEPHONE ENCOUNTER
Her DEXA scan shows osteopenia with T-score -1.1 left femur.  I called her and left message to call back to discuss results    Addendum: I discussed results with her.  She is going to continue vitamin D and we discussed starting calcium 1200 mg daily.  Plan on rechecking DEXA scan in 2 years

## 2025-06-25 ENCOUNTER — APPOINTMENT (OUTPATIENT)
Dept: BEHAVIORAL HEALTH | Facility: CLINIC | Age: 45
End: 2025-06-25
Payer: COMMERCIAL

## 2025-06-25 DIAGNOSIS — F43.10 PTSD (POST-TRAUMATIC STRESS DISORDER): ICD-10-CM

## 2025-06-25 DIAGNOSIS — F41.1 GENERALIZED ANXIETY DISORDER: ICD-10-CM

## 2025-06-25 DIAGNOSIS — F33.1 MODERATE EPISODE OF RECURRENT MAJOR DEPRESSIVE DISORDER: ICD-10-CM

## 2025-06-25 DIAGNOSIS — G47.09 OTHER INSOMNIA: ICD-10-CM

## 2025-06-25 DIAGNOSIS — F43.21 GRIEF: ICD-10-CM

## 2025-06-25 PROBLEM — C50.911 BILATERAL MALIGNANT NEOPLASM OF BREAST IN FEMALE, UNSPECIFIED ESTROGEN RECEPTOR STATUS, UNSPECIFIED SITE OF BREAST: Status: ACTIVE | Noted: 2025-06-25

## 2025-06-25 PROBLEM — C50.912 BILATERAL MALIGNANT NEOPLASM OF BREAST IN FEMALE, UNSPECIFIED ESTROGEN RECEPTOR STATUS, UNSPECIFIED SITE OF BREAST: Status: ACTIVE | Noted: 2025-06-25

## 2025-06-25 PROCEDURE — 99215 OFFICE O/P EST HI 40 MIN: CPT | Performed by: CLINICAL NURSE SPECIALIST

## 2025-06-25 RX ORDER — LAMOTRIGINE 100 MG/1
100 TABLET ORAL DAILY
Qty: 90 TABLET | Refills: 1 | Status: SHIPPED | OUTPATIENT
Start: 2025-06-25 | End: 2025-12-22

## 2025-06-25 RX ORDER — BUSPIRONE HYDROCHLORIDE 5 MG/1
TABLET ORAL
Qty: 60 TABLET | Refills: 2 | Status: SHIPPED | OUTPATIENT
Start: 2025-06-25

## 2025-06-25 RX ORDER — BUSPIRONE HYDROCHLORIDE 10 MG/1
10 TABLET ORAL NIGHTLY
Qty: 30 TABLET | Refills: 2 | Status: SHIPPED | OUTPATIENT
Start: 2025-06-25 | End: 2025-09-23

## 2025-06-25 RX ORDER — ALPRAZOLAM 0.5 MG/1
0.5 TABLET ORAL DAILY PRN
Qty: 10 TABLET | Refills: 1 | Status: SHIPPED | OUTPATIENT
Start: 2025-06-25

## 2025-06-25 RX ORDER — DESVENLAFAXINE 50 MG/1
50 TABLET, EXTENDED RELEASE ORAL DAILY
Qty: 90 TABLET | Refills: 1 | Status: SHIPPED | OUTPATIENT
Start: 2025-06-25 | End: 2025-12-22

## 2025-06-25 NOTE — ASSESSMENT & PLAN NOTE
Some worsening anxious sx, in the context of psychosocial stressors, but contributing to significant disruption. Mutually agreed to gentle titration of buspirone. Discussed indication(s), reviewed risks/benefits/alternatives.  Will otherwise continue current medications without dose changes today.     Increase buspirone to 5 mg PO daily and 15 mg PO at bedtime.   Continue desvenlafaxine ER 50 mg PO daily. -refill sent to pharmacy today.   Continue alprazolam PRN 0.5 mg PO PRN for severe anxiety/panic (short supply- 10 tabs per fill). -refill placed on file at pharmacy today.   Orders placed for annual UDS- has not yet completed.   Continue engagement in individual therapy.     Reviewed OARRS, no discrepancies or concerns. Discussed risk of motor/cognitive impairment, sedation, dependence, tolerance, abuse, withdrawal sequelae, accidental overdose, life-threatening respiratory depression (bhavesh. in combination with alcohol and/or opioids), excess sedation in combination with other sedating medicines.  Noted that she was recently rx'd diazepam intravaginally for chronic pelvic pain.

## 2025-06-25 NOTE — ASSESSMENT & PLAN NOTE
Tolerating and benefiting from current regimen. No indication for medication changes today.      Continue lamotrigine 100 mg PO daily- refill sent to pharmacy today.   Continue desvenlafaxine as noted above.   Continue individual therapy.     Advised of potentially fatal rash (i.e. Mcdowell-Luis Syndrome) in 1:10,000 individuals. Agrees to stop medication and seek medical attention immediately if rash or blistering of the mucosal surfaces develops; also discussed risk for benign drug-induced rash, and advised to cease medication and seek immediate medical attention to r/o SJS given its potential lethality. Further advised that the risk for this reaction increases if proper dose titration is not followed, and that after 2 days of non-adherence, re-titration is necessary.
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
## 2025-06-27 ENCOUNTER — APPOINTMENT (OUTPATIENT)
Dept: GASTROENTEROLOGY | Facility: CLINIC | Age: 45
End: 2025-06-27
Payer: COMMERCIAL

## 2025-06-27 VITALS
SYSTOLIC BLOOD PRESSURE: 108 MMHG | WEIGHT: 184 LBS | BODY MASS INDEX: 30.66 KG/M2 | HEIGHT: 65 IN | TEMPERATURE: 98.7 F | HEART RATE: 114 BPM | DIASTOLIC BLOOD PRESSURE: 71 MMHG

## 2025-06-27 DIAGNOSIS — K21.9 GASTROESOPHAGEAL REFLUX DISEASE WITHOUT ESOPHAGITIS: ICD-10-CM

## 2025-06-27 DIAGNOSIS — R11.0 NAUSEA: Primary | ICD-10-CM

## 2025-06-27 DIAGNOSIS — K58.0 IRRITABLE BOWEL SYNDROME WITH DIARRHEA: ICD-10-CM

## 2025-06-27 PROCEDURE — 3008F BODY MASS INDEX DOCD: CPT | Performed by: NURSE PRACTITIONER

## 2025-06-27 PROCEDURE — 99214 OFFICE O/P EST MOD 30 MIN: CPT | Performed by: NURSE PRACTITIONER

## 2025-06-27 PROCEDURE — 99212 OFFICE O/P EST SF 10 MIN: CPT | Performed by: NURSE PRACTITIONER

## 2025-06-27 RX ORDER — METOCLOPRAMIDE 5 MG/1
5 TABLET ORAL 4 TIMES DAILY
Qty: 360 TABLET | Refills: 0 | Status: SHIPPED | OUTPATIENT
Start: 2025-06-27 | End: 2025-09-25

## 2025-06-27 RX ORDER — CALCIUM CARBONATE 500(1250)
1 TABLET,CHEWABLE ORAL DAILY
COMMUNITY

## 2025-06-27 ASSESSMENT — PAIN SCALES - GENERAL: PAINLEVEL_OUTOF10: 8

## 2025-06-27 NOTE — PATIENT INSTRUCTIONS
Recommendations  Stop Zofran for now. Trial Reglan for possible gastroparesis (delayed gastric emptying). If no improvement in a week or 2 let me know and we can increase dose.  Continue omeprazole 40 mg daily for reflux.  Continue amitriptyline 25 mg nightly with Bentyl (dicyclomine) as needed.  Follow up 3 months.

## 2025-07-03 ENCOUNTER — ANCILLARY PROCEDURE (OUTPATIENT)
Dept: URGENT CARE | Age: 45
End: 2025-07-03
Payer: COMMERCIAL

## 2025-07-03 ENCOUNTER — OFFICE VISIT (OUTPATIENT)
Dept: URGENT CARE | Age: 45
End: 2025-07-03
Payer: COMMERCIAL

## 2025-07-03 VITALS
RESPIRATION RATE: 18 BRPM | BODY MASS INDEX: 32.6 KG/M2 | TEMPERATURE: 98.1 F | OXYGEN SATURATION: 98 % | HEART RATE: 91 BPM | DIASTOLIC BLOOD PRESSURE: 82 MMHG | WEIGHT: 184 LBS | HEIGHT: 63 IN | SYSTOLIC BLOOD PRESSURE: 134 MMHG

## 2025-07-03 DIAGNOSIS — R11.0 CHRONIC NAUSEA: ICD-10-CM

## 2025-07-03 DIAGNOSIS — S99.921A INJURY OF RIGHT FOOT, INITIAL ENCOUNTER: Primary | ICD-10-CM

## 2025-07-03 DIAGNOSIS — M79.671 PAIN IN RIGHT FOOT: ICD-10-CM

## 2025-07-03 PROCEDURE — 73630 X-RAY EXAM OF FOOT: CPT | Mod: RIGHT SIDE

## 2025-07-03 ASSESSMENT — PAIN SCALES - GENERAL: PAINLEVEL_OUTOF10: 7
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
## 2025-07-07 ENCOUNTER — TELEPHONE (OUTPATIENT)
Dept: GASTROENTEROLOGY | Facility: HOSPITAL | Age: 45
End: 2025-07-07
Payer: COMMERCIAL

## 2025-07-07 DIAGNOSIS — R11.0 NAUSEA: Primary | ICD-10-CM

## 2025-07-07 RX ORDER — PROMETHAZINE HYDROCHLORIDE 25 MG/1
12.5 TABLET ORAL EVERY 6 HOURS PRN
Qty: 90 TABLET | Refills: 0 | Status: SHIPPED | OUTPATIENT
Start: 2025-07-07

## 2025-07-07 NOTE — TELEPHONE ENCOUNTER
Spoke to patient. Did not tolerate Reglan due to worsening anxiety. Sent phenergan to take as needed.

## 2025-07-16 ENCOUNTER — APPOINTMENT (OUTPATIENT)
Dept: PRIMARY CARE | Facility: CLINIC | Age: 45
End: 2025-07-16
Payer: COMMERCIAL

## 2025-07-22 ENCOUNTER — TELEPHONE (OUTPATIENT)
Dept: BEHAVIORAL HEALTH | Facility: CLINIC | Age: 45
End: 2025-07-22
Payer: COMMERCIAL

## 2025-07-22 DIAGNOSIS — F43.10 PTSD (POST-TRAUMATIC STRESS DISORDER): ICD-10-CM

## 2025-07-22 DIAGNOSIS — F41.1 GENERALIZED ANXIETY DISORDER: ICD-10-CM

## 2025-07-22 DIAGNOSIS — G47.09 OTHER INSOMNIA: ICD-10-CM

## 2025-07-22 RX ORDER — TRAZODONE HYDROCHLORIDE 100 MG/1
150-200 TABLET ORAL NIGHTLY
Qty: 60 TABLET | Refills: 2 | Status: SHIPPED | OUTPATIENT
Start: 2025-07-22 | End: 2025-10-20

## 2025-07-22 NOTE — PROGRESS NOTES
Pharmacy faxed refill request    Medication: traZODone (Desyrel) 100 mg tablet Take 1.5-2 tablets (150-200 mg) by mouth once daily at bedtime.     Pharmacy: [id number] - [id number] & [facility]    Phone: [phone]     Next Appt: 8/6/2025   Acute Inpatient Rehab

## 2025-07-24 ENCOUNTER — OFFICE VISIT (OUTPATIENT)
Dept: PRIMARY CARE | Facility: CLINIC | Age: 45
End: 2025-07-24
Payer: COMMERCIAL

## 2025-07-24 VITALS
OXYGEN SATURATION: 98 % | BODY MASS INDEX: 32.95 KG/M2 | DIASTOLIC BLOOD PRESSURE: 78 MMHG | SYSTOLIC BLOOD PRESSURE: 132 MMHG | WEIGHT: 186 LBS | TEMPERATURE: 98.7 F | HEART RATE: 89 BPM | RESPIRATION RATE: 18 BRPM

## 2025-07-24 DIAGNOSIS — J06.9 UPPER RESPIRATORY INFECTION, ACUTE: Primary | ICD-10-CM

## 2025-07-24 DIAGNOSIS — J02.9 SORE THROAT: ICD-10-CM

## 2025-07-24 LAB
POC RAPID STREP: NEGATIVE
POC SARS-COV-2 AG BINAX: NORMAL

## 2025-07-24 PROCEDURE — 99213 OFFICE O/P EST LOW 20 MIN: CPT | Performed by: NURSE PRACTITIONER

## 2025-07-24 PROCEDURE — 87880 STREP A ASSAY W/OPTIC: CPT | Performed by: NURSE PRACTITIONER

## 2025-07-24 PROCEDURE — 87811 SARS-COV-2 COVID19 W/OPTIC: CPT | Performed by: NURSE PRACTITIONER

## 2025-07-24 RX ORDER — ALBUTEROL SULFATE 90 UG/1
2 INHALANT RESPIRATORY (INHALATION) EVERY 4 HOURS PRN
Qty: 8.5 G | Refills: 0 | Status: SHIPPED | OUTPATIENT
Start: 2025-07-24 | End: 2025-08-23

## 2025-07-24 RX ORDER — DOXYCYCLINE 100 MG/1
100 CAPSULE ORAL 2 TIMES DAILY
Qty: 20 CAPSULE | Refills: 0 | Status: SHIPPED | OUTPATIENT
Start: 2025-07-24 | End: 2025-08-03

## 2025-07-24 ASSESSMENT — ENCOUNTER SYMPTOMS
HEADACHES: 0
SORE THROAT: 1
FEVER: 0
MYALGIAS: 0
VOMITING: 0
GASTROINTESTINAL NEGATIVE: 1
NAUSEA: 0
APPETITE CHANGE: 0
RHINORRHEA: 0
CARDIOVASCULAR NEGATIVE: 1
CHILLS: 0
DIARRHEA: 0
COUGH: 1

## 2025-07-24 NOTE — PROGRESS NOTES
Subjective   Patient ID: Martina Blackwood is a 44 y.o. female who presents for Sore Throat.    PT swabbed for rapid strep, covid, and pcrs.    Patient reports that runny nose, congestion and ear pain started over the weekend. Yesterday, she developed a cough and sore throat. Tried OTC dayquil and it did not help. Also tried chloraseptic spray and lozenges. No chest pain or difficulty breathing.          Review of Systems   Constitutional:  Negative for appetite change, chills and fever.   HENT:  Positive for congestion, ear pain and sore throat. Negative for rhinorrhea.    Respiratory:  Positive for cough.    Cardiovascular: Negative.    Gastrointestinal: Negative.  Negative for diarrhea, nausea and vomiting.   Musculoskeletal:  Negative for myalgias.   Neurological:  Negative for headaches.     Objective   /78   Pulse 89   Temp 37.1 °C (98.7 °F)   Resp 18   Wt 84.4 kg (186 lb)   SpO2 98%   BMI 32.95 kg/m²     Physical Exam  Vitals reviewed.   Constitutional:       General: She is not in acute distress.     Appearance: Normal appearance. She is not toxic-appearing.   HENT:      Head: Atraumatic.      Right Ear: Ear canal and external ear normal. Tympanic membrane is erythematous.      Left Ear: Tympanic membrane, ear canal and external ear normal. Tympanic membrane is not erythematous or bulging.      Nose: Congestion and rhinorrhea present.      Mouth/Throat:      Mouth: Mucous membranes are moist.      Pharynx: Oropharynx is clear. Posterior oropharyngeal erythema present. No oropharyngeal exudate.      Comments: Uvula midline    Eyes:      Conjunctiva/sclera: Conjunctivae normal.       Cardiovascular:      Rate and Rhythm: Normal rate and regular rhythm.      Heart sounds: Normal heart sounds. No murmur heard.  Pulmonary:      Effort: Pulmonary effort is normal. No respiratory distress.      Breath sounds: Normal breath sounds. No wheezing or rhonchi.     Skin:     General: Skin is warm and dry.      Neurological:      General: No focal deficit present.      Mental Status: She is alert.     Psychiatric:         Mood and Affect: Mood normal.     Assessment/Plan   Problem List Items Addressed This Visit    None  Visit Diagnoses         Codes      Upper respiratory infection, acute    -  Primary J06.9    Relevant Medications    doxycycline (Vibramycin) 100 mg capsule    albuterol (ProAir HFA) 90 mcg/actuation inhaler      Sore throat     J02.9    Relevant Orders    POCT Rapid Strep A manually resulted (Completed)    POCT BinaxNOW Covid-19 Ag Card manually resulted (Completed)    Sars-CoV-2 PCR    Group A Streptococcus, PCR        Rapid strep and covid are negative. Will get PCR strep and COVID testing. Patient started on doxycycline and proair inhaler. Advised patient on use of humidifier and hot steam treatments. Discussed that patient is to drink plenty of fluids and stay well hydrated. Can take tylenol as needed for any fevers or discomfort.  Discussed that patient is to go to the ER for any chest pain, difficulty breathing, shortness of breath or new/concerning symptoms; she agreed. Will call pt when results become available. Pt reminded to self quarantine; she agreed. Pt to follow up in 2-3 days if no better despite the use of the medications.

## 2025-07-25 ENCOUNTER — RESULTS FOLLOW-UP (OUTPATIENT)
Dept: PRIMARY CARE | Facility: CLINIC | Age: 45
End: 2025-07-25
Payer: COMMERCIAL

## 2025-07-25 LAB
S PYO DNA THROAT QL NAA+PROBE: NOT DETECTED
SARS-COV-2 RNA RESP QL NAA+PROBE: NOT DETECTED

## 2025-07-25 NOTE — TELEPHONE ENCOUNTER
Tried calling pt and there was no answer. Covid testing is negative. Will send message to support staff

## 2025-07-25 NOTE — TELEPHONE ENCOUNTER
Spoke to pt and relayed results of negative strep testing. Will call pt when COVID results become available

## 2025-08-04 ENCOUNTER — APPOINTMENT (OUTPATIENT)
Dept: BEHAVIORAL HEALTH | Facility: CLINIC | Age: 45
End: 2025-08-04
Payer: COMMERCIAL

## 2025-08-06 ENCOUNTER — APPOINTMENT (OUTPATIENT)
Dept: BEHAVIORAL HEALTH | Facility: CLINIC | Age: 45
End: 2025-08-06
Payer: COMMERCIAL

## 2025-08-06 DIAGNOSIS — F43.10 PTSD (POST-TRAUMATIC STRESS DISORDER): ICD-10-CM

## 2025-08-06 DIAGNOSIS — G47.09 OTHER INSOMNIA: ICD-10-CM

## 2025-08-06 DIAGNOSIS — F33.1 MODERATE EPISODE OF RECURRENT MAJOR DEPRESSIVE DISORDER: ICD-10-CM

## 2025-08-06 DIAGNOSIS — F41.1 GENERALIZED ANXIETY DISORDER: ICD-10-CM

## 2025-08-06 DIAGNOSIS — F43.21 GRIEF: ICD-10-CM

## 2025-08-06 PROCEDURE — 99214 OFFICE O/P EST MOD 30 MIN: CPT | Performed by: CLINICAL NURSE SPECIALIST

## 2025-08-06 RX ORDER — LAMOTRIGINE 25 MG/1
25 TABLET ORAL DAILY
Qty: 90 TABLET | Refills: 0 | Status: SHIPPED | OUTPATIENT
Start: 2025-08-06 | End: 2025-11-04

## 2025-08-06 RX ORDER — ALPRAZOLAM 0.5 MG/1
0.5 TABLET ORAL DAILY PRN
Qty: 10 TABLET | Refills: 1 | Status: SHIPPED | OUTPATIENT
Start: 2025-08-21

## 2025-08-06 NOTE — ASSESSMENT & PLAN NOTE
Endorsing irritable mood, anhedonia, disinterest, guilt, and anergia. Mutually agreed to trial titration of lamotrigine. Has benefited from higher dose in the past. She is also now receiving HRT with estrogen, which can lower lamotrigine levels. Discussed indication(s), reviewed risks/benefits/alternatives.      Increase lamotrigine to 125 mg PO daily.  Continue desvenlafaxine as noted above.   Continue individual therapy.     Advised of potentially fatal rash (i.e. Mcdowell-Luis Syndrome) in 1:10,000 individuals. Agrees to stop medication and seek medical attention immediately if rash or blistering of the mucosal surfaces develops; also discussed risk for benign drug-induced rash, and advised to cease medication and seek immediate medical attention to r/o SJS given its potential lethality. Further advised that the risk for this reaction increases if proper dose titration is not followed, and that after 2 days of non-adherence, re-titration is necessary.

## 2025-08-06 NOTE — PROGRESS NOTES
"Outpatient Psychiatry      Subjective   Jennifer KAILA Jaison \"Martina\", is a 44 y.o. female,  seen in follow-up.      Assessment/Plan   Problem List Items Addressed This Visit           ICD-10-CM    Generalized anxiety disorder F41.1    Tolerated titration of buspirone with modest benefit, particularly for anxious sx in the evenings before bed. Doubt that recent exacerbation in anxiety and irritability is r/t titration of buspirone ~6 weeks ago. Possible that recent tx with albuterol PRN could have contributed to brief uptick in sx, but she has completed therapy. Given significant component of agitation/irritability, mutually agreed to titration of lamotrigine as noted below.      Continue buspirone 5 mg PO daily and 15 mg PO at bedtime. -no refill needed today.    Continue desvenlafaxine ER 50 mg PO daily. -no refill needed today.    Continue alprazolam PRN 0.5 mg PO PRN for severe anxiety/panic (short supply- 10 tabs per fill). -refill placed on file at pharmacy today.   Orders placed for annual UDS- has not yet completed.   Continue engagement in individual therapy.     Reviewed OARRS, no discrepancies or concerns. Discussed risk of motor/cognitive impairment, sedation, dependence, tolerance, abuse, withdrawal sequelae, accidental overdose, life-threatening respiratory depression (bhavesh. in combination with alcohol and/or opioids), excess sedation in combination with other sedating medicines.          Relevant Medications    ALPRAZolam (Xanax) 0.5 mg tablet (Start on 8/21/2025)    Other Relevant Orders    Follow Up In Psychiatry    PTSD (post-traumatic stress disorder) F43.10    Plan as noted above.          Relevant Medications    ALPRAZolam (Xanax) 0.5 mg tablet (Start on 8/21/2025)    lamoTRIgine (LaMICtal) 25 mg tablet    Other Relevant Orders    Follow Up In Psychiatry    Insomnia G47.00    Tolerating and benefiting from current regimen. No indication for medication changes today.      Continue trazodone 150-200 " mg PO at bedtime- no refill needed today.           Relevant Orders    Follow Up In Psychiatry    Moderate episode of recurrent major depressive disorder F33.1    Endorsing irritable mood, anhedonia, disinterest, guilt, and anergia. Mutually agreed to trial titration of lamotrigine. Has benefited from higher dose in the past. She is also now receiving HRT with estrogen, which can lower lamotrigine levels. Discussed indication(s), reviewed risks/benefits/alternatives.      Increase lamotrigine to 125 mg PO daily.  Continue desvenlafaxine as noted above.   Continue individual therapy.     Advised of potentially fatal rash (i.e. Mcdowell-Luis Syndrome) in 1:10,000 individuals. Agrees to stop medication and seek medical attention immediately if rash or blistering of the mucosal surfaces develops; also discussed risk for benign drug-induced rash, and advised to cease medication and seek immediate medical attention to r/o SJS given its potential lethality. Further advised that the risk for this reaction increases if proper dose titration is not followed, and that after 2 days of non-adherence, re-titration is necessary.          Relevant Medications    ALPRAZolam (Xanax) 0.5 mg tablet (Start on 8/21/2025)    lamoTRIgine (LaMICtal) 25 mg tablet    Other Relevant Orders    Follow Up In Psychiatry    Grief F43.21    Aware that I can assist with resources should she wish to pursue grief therapy.  Continue individual therapy.          Relevant Orders    Follow Up In Psychiatry       Follow-up in 4-6 weeks.     Risk Assessment:  Risk Assessment: Jennifer Blackwood is currently a low acute risk of suicide and self-harm due to no past suicide attempt(s) and not currently endorsing thoughts of suicide. Jennifer Blackwood is currently a low acute risk of violence and harm to others due to no past history of violence and not currently threatening others.  Suicidal Risk Factors: , history of trauma/abuse, chronic  "medical illness, current psychiatric illness, and panic attacks  Violence Risk Factors: none  Protective Factors: strong coping skills, sense of responsibility towards family, social support/connectedness, child related concerns/living with child <18 years, positive family relationships, hopefulness/future orientation, and marriage/partnership     Provided with crisis/emergency resources, including the [facility] Crisis Hotline 1-109.906.8946, Crisis Text Line (text 0JNLU sa 685139) and the National Suicide Prevention Lifeline hotline 1-199.698.7437. Agrees to call 988 or go to the nearest emergency department if she feels unsafe, or has suicidal thinking with a plan or intent.       Reason for Visit:  Follow-up for medication management.     HPI:  Since her last visit,     Things are \"so-so.\"  Having a lot of panic attacks.   Monday had 3 panic attacks throughout the day.   Doesn't typically have multiple attacks in a day. Not sure what's contributing.   Over the past month and a half seems they've been progressively worsening.   Can't identify any specific triggers, no new stressors, no pattern she's noted so far.      Sleep has been a little bit better.   (+) anhedonia  (+) disinterest  Mood has been more irritable.  Really upsets her, tries her best not to let it impact how she interacts with family, but has been more difficult to manage recently.   Feels, \"blah,\" a lot of the time.     Tolerated increase in buspirone.   Thinks it has been beneficial for anxiety before bed, and maybe why she's sleeping better lately.     Denies suicidal ideation or a passive death wish.     Recently treated with antibiotic and inhaler for respiratory illness. Nausea medicine changed from ondansetron to promethazine PRN. Otherwise, no new medications or health problems.       Current Psychiatric Medications:  Desvenlafaxine 50 mg PO daily  Buspirone 5 mg PO daily and 15 mg PO at bedtime  Lamotrigine 100 mg PO daily  Trazodone " "150-200 mg PO at bedtime PRN for sleep  Alprazolam 0.5 mg PO PRN for severe anxiety      Record Review: brief     Medical Review Of Systems:  Pertinent items are noted in HPI.    Psychiatric Review Of Systems:  As noted in HPI.        Objective   Mental Status Exam  General Appearance: Well groomed, appropriate eye contact  Attitude/Behavior: Cooperative  Motor: No psychomotor agitation or retardation, no tremor or other abnormal movements  Speech: Normal rate, volume, prosody  Mood: \"so-so.\"  Affect: Congruent with mood and topic of conversation  Thought Process: Linear, goal directed  Thought Associations: No loosening of associations  Thought Content: Other: (comment) (anxious and depressive themes.)  Perception: No perceptual abnormalities noted  Sensorium: Alert and oriented to person, place, time and situation  Insight: Intact  Judgement: Intact  Cognition: Cognitively intact to conversational testing with respect to attention, orientation, fund of knowledge, recent and remote memory, and language    Vitals:  There were no vitals filed for this visit.    Labs:  not applicable          Allyn Acosta, APRN-CNP, APRN-CNS  "

## 2025-08-06 NOTE — ASSESSMENT & PLAN NOTE
Tolerated titration of buspirone with modest benefit, particularly for anxious sx in the evenings before bed. Doubt that recent exacerbation in anxiety and irritability is r/t titration of buspirone ~6 weeks ago. Possible that recent tx with albuterol PRN could have contributed to brief uptick in sx, but she has completed therapy. Given significant component of agitation/irritability, mutually agreed to titration of lamotrigine as noted below.      Continue buspirone 5 mg PO daily and 15 mg PO at bedtime. -no refill needed today.    Continue desvenlafaxine ER 50 mg PO daily. -no refill needed today.    Continue alprazolam PRN 0.5 mg PO PRN for severe anxiety/panic (short supply- 10 tabs per fill). -refill placed on file at pharmacy today.   Orders placed for annual UDS- has not yet completed.   Continue engagement in individual therapy.     Reviewed OARRS, no discrepancies or concerns. Discussed risk of motor/cognitive impairment, sedation, dependence, tolerance, abuse, withdrawal sequelae, accidental overdose, life-threatening respiratory depression (bhavesh. in combination with alcohol and/or opioids), excess sedation in combination with other sedating medicines.

## 2025-08-12 ENCOUNTER — TELEPHONE (OUTPATIENT)
Dept: PEDIATRICS | Facility: CLINIC | Age: 45
End: 2025-08-12
Payer: COMMERCIAL

## 2025-08-12 ENCOUNTER — TELEPHONE (OUTPATIENT)
Dept: PRIMARY CARE | Facility: CLINIC | Age: 45
End: 2025-08-12
Payer: COMMERCIAL

## 2025-08-12 DIAGNOSIS — J06.9 UPPER RESPIRATORY INFECTION, ACUTE: ICD-10-CM

## 2025-08-13 DIAGNOSIS — G62.9 NEUROPATHY: ICD-10-CM

## 2025-08-14 RX ORDER — GABAPENTIN 400 MG/1
CAPSULE ORAL
Qty: 90 CAPSULE | Refills: 5 | Status: SHIPPED | OUTPATIENT
Start: 2025-08-14

## 2025-09-03 ENCOUNTER — OFFICE VISIT (OUTPATIENT)
Dept: PRIMARY CARE | Facility: CLINIC | Age: 45
End: 2025-09-03
Payer: COMMERCIAL

## 2025-09-03 VITALS
BODY MASS INDEX: 33.69 KG/M2 | TEMPERATURE: 98.3 F | RESPIRATION RATE: 17 BRPM | HEART RATE: 94 BPM | WEIGHT: 190.2 LBS | SYSTOLIC BLOOD PRESSURE: 124 MMHG | OXYGEN SATURATION: 97 % | DIASTOLIC BLOOD PRESSURE: 70 MMHG

## 2025-09-03 DIAGNOSIS — H00.011 HORDEOLUM EXTERNUM OF RIGHT UPPER EYELID: Primary | ICD-10-CM

## 2025-09-03 PROCEDURE — 99213 OFFICE O/P EST LOW 20 MIN: CPT | Performed by: NURSE PRACTITIONER

## 2025-09-03 RX ORDER — BACITRACIN ZINC AND POLYMYXIN B SULFATE 500; 10000 [USP'U]/G; [USP'U]/G
OINTMENT OPHTHALMIC EVERY 12 HOURS
Qty: 3.5 G | Refills: 0 | Status: SHIPPED | OUTPATIENT
Start: 2025-09-03 | End: 2025-09-13

## 2025-09-03 ASSESSMENT — ENCOUNTER SYMPTOMS
VOMITING: 0
DIARRHEA: 0
FEVER: 0
NAUSEA: 0
APPETITE CHANGE: 0
ACTIVITY CHANGE: 0
SLEEP DISTURBANCE: 0
CHILLS: 0
EYE DISCHARGE: 0
PHOTOPHOBIA: 0
EYE ITCHING: 0

## 2025-09-03 ASSESSMENT — VISUAL ACUITY: OU: 1

## 2025-09-04 ENCOUNTER — APPOINTMENT (OUTPATIENT)
Dept: ORTHOPEDIC SURGERY | Facility: CLINIC | Age: 45
End: 2025-09-04
Payer: COMMERCIAL

## 2025-09-18 ENCOUNTER — APPOINTMENT (OUTPATIENT)
Dept: BEHAVIORAL HEALTH | Facility: CLINIC | Age: 45
End: 2025-09-18
Payer: COMMERCIAL

## 2025-10-03 ENCOUNTER — APPOINTMENT (OUTPATIENT)
Dept: GASTROENTEROLOGY | Facility: CLINIC | Age: 45
End: 2025-10-03
Payer: COMMERCIAL

## 2025-11-10 ENCOUNTER — APPOINTMENT (OUTPATIENT)
Dept: PRIMARY CARE | Facility: CLINIC | Age: 45
End: 2025-11-10
Payer: COMMERCIAL

## (undated) DEVICE — DRESSING, GAUZE, PETROLATUM, STRIP, XEROFORM, 1 X 8 IN, STERILE

## (undated) DEVICE — DRESSING, GAUZE, SPONGE, 12 PLY, CURITY, 4 X 4 IN, STERILE

## (undated) DEVICE — PACK, BASIC

## (undated) DEVICE — DRAPE, TOWEL, STERI DRAPE, 17 X 11 IN, PLASTIC, STERILE

## (undated) DEVICE — BANDAGE, ESMARK 4 IN X 9 FT, STERILE

## (undated) DEVICE — BANDAGE, ELASTIC, ACE, SELF-CLOSURE, 3 IN X 5 YD, NONSTERILE

## (undated) DEVICE — PREP TRAY, SKIN, DRY, W/12 SPONGES, W/GLOVES

## (undated) DEVICE — STOCKINETTE, DOUBLE PLY, 4 X 48 IN, STERILE

## (undated) DEVICE — DRAPE, SHEET, HAND, GUSSETTED, W/TABLE EXTENSION, 77 X 146 IN, DISPOSABLE, LF, STERILE

## (undated) DEVICE — TOWEL, SURGICAL, NEURO, O/R, 16 X 26, BLUE, STERILE

## (undated) DEVICE — Device

## (undated) DEVICE — PADDING, WEBRIL, UNDERCAST, STERILE, 4 IN

## (undated) DEVICE — SUTURE, MONOCRYLIC, 4-0, P3, MONO 18

## (undated) DEVICE — SPONGE, GAUZE, XRAY DECT, 16 PLY, 4 X 4, W/MASTER DMT,STERILE

## (undated) DEVICE — SUTURE, VICRYL, 4-0, 18 IN, P-3, UNDYED

## (undated) DEVICE — CORD, CAUTERY, BIOPOLAR FORCEP, 12FT

## (undated) DEVICE — CUFF, TOURNIQUET, 18 X 4, SNGL PORT/SNGL BLADDER, DISP, LF